# Patient Record
Sex: FEMALE | Race: BLACK OR AFRICAN AMERICAN | NOT HISPANIC OR LATINO | Employment: FULL TIME | URBAN - METROPOLITAN AREA
[De-identification: names, ages, dates, MRNs, and addresses within clinical notes are randomized per-mention and may not be internally consistent; named-entity substitution may affect disease eponyms.]

---

## 2019-01-01 ENCOUNTER — HOSPITAL ENCOUNTER (EMERGENCY)
Facility: HOSPITAL | Age: 19
Discharge: HOME | End: 2019-01-01
Attending: EMERGENCY MEDICINE | Admitting: EMERGENCY MEDICINE
Payer: COMMERCIAL

## 2019-01-01 VITALS
OXYGEN SATURATION: 98 % | TEMPERATURE: 98.7 F | SYSTOLIC BLOOD PRESSURE: 132 MMHG | HEART RATE: 110 BPM | RESPIRATION RATE: 20 BRPM | DIASTOLIC BLOOD PRESSURE: 62 MMHG

## 2019-01-01 DIAGNOSIS — L03.213 PERIORBITAL CELLULITIS OF LEFT EYE: Primary | ICD-10-CM

## 2019-01-01 PROCEDURE — 99282 EMERGENCY DEPT VISIT SF MDM: CPT

## 2019-01-01 RX ORDER — ERYTHROMYCIN 5 MG/G
0.5 OINTMENT OPHTHALMIC ONCE
Status: COMPLETED | OUTPATIENT
Start: 2019-01-01 | End: 2019-01-01

## 2019-01-01 RX ORDER — CLINDAMYCIN HYDROCHLORIDE 150 MG/1
450 CAPSULE ORAL EVERY 8 HOURS SCHEDULED
Qty: 90 CAPSULE | Refills: 0 | Status: SHIPPED | OUTPATIENT
Start: 2019-01-01 | End: 2019-01-11

## 2019-01-01 RX ORDER — ERYTHROMYCIN 5 MG/G
OINTMENT OPHTHALMIC
Qty: 3.5 G | Refills: 1 | Status: SHIPPED | OUTPATIENT
Start: 2019-01-01 | End: 2019-07-11 | Stop reason: ALTCHOICE

## 2019-01-01 RX ORDER — CLINDAMYCIN HYDROCHLORIDE 150 MG/1
450 CAPSULE ORAL ONCE
Status: COMPLETED | OUTPATIENT
Start: 2019-01-01 | End: 2019-01-01

## 2019-01-01 RX ADMIN — CLINDAMYCIN HYDROCHLORIDE 450 MG: 150 CAPSULE ORAL at 14:33

## 2019-01-01 RX ADMIN — ERYTHROMYCIN 0.5 INCH: 5 OINTMENT OPHTHALMIC at 14:33

## 2019-01-01 NOTE — DISCHARGE INSTRUCTIONS
Periorbital Cellulitis in Adults   WHAT YOU NEED TO KNOW:   Periorbital cellulitis is an infection of the skin and tissues in the front of your eye  The infection can quickly cause vision problems  It can spread to your brain and cause meningitis  Periorbital cellulitis must be treated immediately to prevent serious complications  DISCHARGE INSTRUCTIONS:   Seek care immediately if:   · You lose vision in your infected eye  · You have vision problems, such as double vision  · You have difficulty moving your eyeball  · You cannot close your eye due to swelling  · You have a fever  · You develop a headache and are vomiting  · You have a stiff neck  Contact your healthcare provider if:   · Your symptoms do not get better within 24 hours of treatment  · Your symptoms return  · You have questions or concerns about your condition or care  Medicines:   · Antibiotics  are used to treat a bacterial infection  · Take your medicine as directed  Contact your healthcare provider if you think your medicine is not helping or if you have side effects  Tell him or her if you are allergic to any medicine  Keep a list of the medicines, vitamins, and herbs you take  Include the amounts, and when and why you take them  Bring the list or the pill bottles to follow-up visits  Carry your medicine list with you in case of an emergency  Follow up with your healthcare provider as directed: You may be referred to other healthcare providers  You may also need more treatment  Write down your questions so you remember to ask them during your visits  Keep your eyes clean:  Clean your eyes with warm water daily and as needed  Wash your hands with soap and water before and after you clean your eyes  Protect your eyes:  Do not scratch or rub your eyes  © 2017 [employer] Information is for End User's use only and may not be sold, redistributed or otherwise used for commercial purposes   All illustrations and images included in CareNotes® are the copyrighted property of [employer]  or Jerel Mckeon  The above information is an  only  It is not intended as medical advice for individual conditions or treatments  Talk to your doctor, nurse or pharmacist before following any medical regimen to see if it is safe and effective for you

## 2019-01-01 NOTE — ED NOTES
Pt noted with swelling surrounding left eye  Pt reports eye swelling started yesterday  No distress noted  Pt resting with eyes closed in bed  Pt discussing plans for going out to eat with sister after treatment         Alexandria Tompkins RN  01/01/19 1568

## 2019-01-01 NOTE — ED PROVIDER NOTES
History  Chief Complaint   Patient presents with    Eye Redness     pt presents to the ed with left eye redness and swelling x 2 day        History provided by:  Patient   used: No    Eye Problem   Location:  Left eye  Quality: Swelling  Severity:  Moderate  Onset quality:  Gradual  Duration:  1 day  Timing:  Constant  Progression:  Worsening  Context: not burn, not chemical exposure, not direct trauma, not foreign body, not using machinery, not scratch, not smoke exposure and not UV exposure    Relieved by:  None tried  Worsened by:  Nothing  Ineffective treatments:  None tried  Associated symptoms: crusting    Associated symptoms: no blurred vision, no decreased vision, no discharge, no itching, no nausea, no photophobia, no vomiting and no weakness        None       History reviewed  No pertinent past medical history  History reviewed  No pertinent surgical history  History reviewed  No pertinent family history  I have reviewed and agree with the history as documented  Social History   Substance Use Topics    Smoking status: Not on file    Smokeless tobacco: Not on file    Alcohol use Yes        Review of Systems   Constitutional: Negative for activity change, appetite change, chills, diaphoresis, fatigue and fever  HENT: Negative for congestion, dental problem, ear discharge, facial swelling, nosebleeds, rhinorrhea, sinus pressure and trouble swallowing  Eyes: Negative for blurred vision, photophobia, discharge, itching and visual disturbance  Respiratory: Negative for choking, chest tightness and shortness of breath  Cardiovascular: Negative for chest pain, palpitations and leg swelling  Gastrointestinal: Negative for abdominal distention, abdominal pain, constipation, diarrhea, nausea and vomiting  Endocrine: Negative for polydipsia and polyphagia     Genitourinary: Negative for decreased urine volume, difficulty urinating, dysuria, flank pain, frequency, hematuria, vaginal bleeding and vaginal discharge  Musculoskeletal: Negative for back pain, gait problem, joint swelling, neck pain and neck stiffness  Skin: Negative for color change and rash  Neurological: Negative for dizziness, facial asymmetry, speech difficulty, weakness and light-headedness  Psychiatric/Behavioral: Negative for agitation and behavioral problems  The patient is not nervous/anxious and is not hyperactive  All other systems reviewed and are negative  Physical Exam  Physical Exam   Constitutional: She is oriented to person, place, and time  She appears well-developed and well-nourished  No distress  HENT:   Head: Normocephalic and atraumatic  Eyes: Pupils are equal, round, and reactive to light  EOM are normal    Puffiness around left eye  No pain on extraocular movements  Vision 20/30 bilaterally  No proptosis  No conjunctival injection  Neck: Normal range of motion  Neck supple  Cardiovascular: Normal rate, regular rhythm and normal heart sounds  No murmur heard  Pulmonary/Chest: Effort normal and breath sounds normal  No respiratory distress  She has no wheezes  She has no rales  Abdominal: Soft  Bowel sounds are normal  She exhibits no distension  There is no tenderness  There is no rebound and no guarding  Musculoskeletal: Normal range of motion  She exhibits no edema or deformity  Lymphadenopathy:     She has no cervical adenopathy  Neurological: She is alert and oriented to person, place, and time  No cranial nerve deficit  She exhibits normal muscle tone  Coordination normal    Skin: Capillary refill takes less than 2 seconds  No rash noted  No erythema  Psychiatric: She has a normal mood and affect  Her behavior is normal    Nursing note and vitals reviewed        Vital Signs  ED Triage Vitals [01/01/19 1352]   Temperature Pulse Respirations Blood Pressure SpO2   98 7 °F (37 1 °C) (!) 110 20 132/62 98 %      Temp Source Heart Rate Source Patient Position - Orthostatic VS BP Location FiO2 (%)   Tympanic Monitor -- -- --      Pain Score       --           Vitals:    01/01/19 1352   BP: 132/62   Pulse: (!) 110       Visual Acuity      ED Medications  Medications   erythromycin (ILOTYCIN) 0 5 % ophthalmic ointment 0 5 inch (0 5 inches Left Eye Given 1/1/19 1433)   clindamycin (CLEOCIN) capsule 450 mg (450 mg Oral Given 1/1/19 1433)       Diagnostic Studies  Results Reviewed     None                 No orders to display              Procedures  Procedures       Phone Contacts  ED Phone Contact    ED Course                               MDM  Number of Diagnoses or Management Options  Periorbital cellulitis of left eye: new and does not require workup  Diagnosis management comments: 21-year-old female presents emergency department for evaluation of swelling around left eye  Denies any visual complaints  No pain on extraocular movements  History and physical exam consistent with periorbital cellulitis  No foreign body sensation, no ocular trauma  Patient given clindamycin in emergency department, erythromycin and discharged in stable condition  Very strict return precautions given to patient to return to ED should she have any worsening symptoms including pain in her eye, worsening vision, increased swelling  Patient took 1st dose of antibiotics in emergency department, discharged in stable condition  No labs or imaging clinically indicated at this time      Risk of Complications, Morbidity, and/or Mortality  Presenting problems: moderate  Diagnostic procedures: moderate  Management options: moderate    Patient Progress  Patient progress: stable    CritCare Time    Disposition  Final diagnoses:   Periorbital cellulitis of left eye     Time reflects when diagnosis was documented in both MDM as applicable and the Disposition within this note     Time User Action Codes Description Comment    1/1/2019  2:53 PM Shaw Walker Add [[id number]] Periorbital cellulitis of left eye       ED Disposition     ED Disposition Condition Comment    Discharge  Graciela Naldo [address] discharge to home/self care  Condition at discharge: Good        Follow-up Information     Follow up With Specialties Details Why Contact Info Additional Information    [address] Emergency Department Emergency Medicine In 1 day If symptoms worsen [address] [facility], [place], [place], [address]          Discharge Medication List as of 1/1/2019  2:54 PM      START taking these medications    Details   clindamycin (CLEOCIN) 150 mg capsule Take 3 capsules (450 mg total) by mouth every 8 (eight) hours for 10 days, Starting Tue 1/1/2019, Until Fri 1/11/2019, Print      erythromycin (ILOTYCIN) ophthalmic ointment Place a 1/2 inch ribbon of ointment into the lower eyelid four times per day for 7 days  , Print           No discharge procedures on file      ED Provider  Electronically Signed by           Brian Bazan MD  01/01/19 6085

## 2019-01-02 ENCOUNTER — HOSPITAL ENCOUNTER (EMERGENCY)
Facility: HOSPITAL | Age: 19
Discharge: HOME | End: 2019-01-02
Attending: EMERGENCY MEDICINE | Admitting: EMERGENCY MEDICINE
Payer: COMMERCIAL

## 2019-01-02 VITALS
TEMPERATURE: 98.7 F | SYSTOLIC BLOOD PRESSURE: 127 MMHG | RESPIRATION RATE: 18 BRPM | HEART RATE: 91 BPM | BODY MASS INDEX: 20.21 KG/M2 | OXYGEN SATURATION: 99 % | HEIGHT: 68 IN | DIASTOLIC BLOOD PRESSURE: 60 MMHG | WEIGHT: 133.38 LBS

## 2019-01-02 DIAGNOSIS — L03.213 PERIORBITAL CELLULITIS OF LEFT EYE: Primary | ICD-10-CM

## 2019-01-02 PROCEDURE — 99283 EMERGENCY DEPT VISIT LOW MDM: CPT

## 2019-01-02 NOTE — DISCHARGE INSTRUCTIONS
Cellulitis   WHAT YOU NEED TO KNOW:   Cellulitis is a skin infection caused by bacteria  Cellulitis may go away on its own or you may need treatment  Your healthcare provider may draw a [place] around the outside edges of your cellulitis  If your cellulitis spreads, your healthcare provider will see it outside of the [place]  DISCHARGE INSTRUCTIONS:   Call 911 if:   · You have sudden trouble breathing or chest pain  Return to the emergency department if:   · Your wound gets larger and more painful  · You feel a crackling under your skin when you touch it  · You have purple dots or bumps on your skin, or you see bleeding under your skin  · You have new swelling and pain in your legs  · The red, warm, swollen area gets larger  · You see red streaks coming from the infected area  Contact your healthcare provider if:   · You have a fever  · Your fever or pain does not go away or gets worse  · The area does not get smaller after 2 days of antibiotics  · Your skin is flaking or peeling off  · You have questions or concerns about your condition or care  Medicines:   · Antibiotics  help treat the bacterial infection  · NSAIDs , such as ibuprofen, help decrease swelling, pain, and fever  NSAIDs can cause stomach bleeding or kidney problems in certain people  If you take blood thinner medicine, always ask if NSAIDs are safe for you  Always read the medicine label and follow directions  Do not give these medicines to children under 10months of age without direction from your child's healthcare provider  · Acetaminophen  decreases pain and fever  It is available without a doctor's order  Ask how much to take and how often to take it  Follow directions  Read the labels of all other medicines you are using to see if they also contain acetaminophen, or ask your doctor or pharmacist  Acetaminophen can cause liver damage if not taken correctly   Do not use more than 4 grams (4,000 milligrams) total of acetaminophen in one day  · Take your medicine as directed  Contact your healthcare provider if you think your medicine is not helping or if you have side effects  Tell him or her if you are allergic to any medicine  Keep a list of the medicines, vitamins, and herbs you take  Include the amounts, and when and why you take them  Bring the list or the pill bottles to follow-up visits  Carry your medicine list with you in case of an emergency  Self-care:   · Elevate the area above the level of your heart  as often as you can  This will help decrease swelling and pain  Prop the area on pillows or blankets to keep it elevated comfortably  · Clean the area daily until the wound scabs over  Gently wash the area with soap and water  Pat dry  Use dressings as directed  · Place cool or warm, wet cloths on the area as directed  Use clean cloths and clean water  Leave it on the area until the cloth is room temperature  Pat the area dry with a clean, dry cloth  The cloths may help decrease pain  Prevent cellulitis:   · Do not scratch bug bites or areas of injury  You increase your risk for cellulitis by scratching these areas  · Do not share personal items, such as towels, clothing, and razors  · Clean exercise equipment  with germ-killing  before and after you use it  · Wash your hands often  Use soap and water  Wash your hands after you use the bathroom, change a child's diapers, or sneeze  Wash your hands before you prepare or eat food  Use lotion to prevent dry, cracked skin  · Wear pressure stockings as directed  You may be told to wear the stockings if you have peripheral edema  The stockings improve blood flow and decrease swelling  · Treat athlete's foot  This can help prevent the spread of a bacterial skin infection  Follow up with your healthcare provider within 3 days, or as directed:   Your healthcare provider will check if your cellulitis is getting better  You may need different medicine  Write down your questions so you remember to ask them during your visits  © 2017 [employer] Information is for End User's use only and may not be sold, redistributed or otherwise used for commercial purposes  All illustrations and images included in CareNotes® are the copyrighted property of [employer]  or Jerel Mckeon  The above information is an  only  It is not intended as medical advice for individual conditions or treatments  Talk to your doctor, nurse or pharmacist before following any medical regimen to see if it is safe and effective for you

## 2019-01-02 NOTE — ED PROVIDER NOTES
History  Chief Complaint   Patient presents with    Eye Problem     Patient was here yesterday for LT eye infection,eye is still swollen and red and now is complaining of a stiff neck     79-year-old female here for recheck of left periorbital cellulitis  Patient states was swollen this morning but appears asymptomatic at this point states pain is greatly improved and swelling has reduced redness has gone away she feels much better  Patient states she was able to by enough medication for today is working on getting enough money to buy the rest of the prescription  No fevers no systemic symptoms no eye pain at this point  History provided by:  Patient   used: No        Prior to Admission Medications   Prescriptions Last Dose Informant Patient Reported? Taking? clindamycin (CLEOCIN) 150 mg capsule   No Yes   Sig: Take 3 capsules (450 mg total) by mouth every 8 (eight) hours for 10 days   erythromycin (ILOTYCIN) ophthalmic ointment   No Yes   Sig: Place a 1/2 inch ribbon of ointment into the lower eyelid four times per day for 7 days  Facility-Administered Medications: None       History reviewed  No pertinent past medical history  History reviewed  No pertinent surgical history  History reviewed  No pertinent family history  I have reviewed and agree with the history as documented  Social History   Substance Use Topics    Smoking status: Current Every Day Smoker    Smokeless tobacco: Never Used    Alcohol use Yes        Review of Systems   Constitutional: Negative for activity change, chills, diaphoresis and fever  HENT: Negative for congestion, ear pain, nosebleeds, sore throat, trouble swallowing and voice change  Eyes: Negative for pain, discharge and redness  Respiratory: Negative for apnea, cough, choking, shortness of breath, wheezing and stridor  Cardiovascular: Negative for chest pain and palpitations     Gastrointestinal: Negative for abdominal distention, abdominal pain, constipation, diarrhea, nausea and vomiting  Endocrine: Negative for polydipsia  Genitourinary: Negative for difficulty urinating, dysuria, flank pain, frequency, hematuria and urgency  Musculoskeletal: Negative for back pain, gait problem, joint swelling, myalgias, neck pain and neck stiffness  Skin: Negative for pallor and rash  Neurological: Negative for dizziness, tremors, syncope, speech difficulty, weakness, numbness and headaches  Hematological: Negative for adenopathy  Psychiatric/Behavioral: Negative for confusion, hallucinations, self-injury and suicidal ideas  The patient is not nervous/anxious  Physical Exam  Physical Exam   Constitutional: She is oriented to person, place, and time  Vital signs are normal  She appears well-developed and well-nourished  HENT:   Head: Normocephalic and atraumatic  Right Ear: External ear normal    Left Ear: External ear normal    Nose: Nose normal    Mouth/Throat: Oropharynx is clear and moist    Eyes: Pupils are equal, round, and reactive to light  Conjunctivae and EOM are normal    Neck: Normal range of motion  Neck supple  Cardiovascular: Normal rate and regular rhythm  Pulmonary/Chest: Effort normal and breath sounds normal    Abdominal: Soft  Bowel sounds are normal    Musculoskeletal: Normal range of motion  Neurological: She is alert and oriented to person, place, and time  Skin: Skin is warm  Psychiatric: She has a normal mood and affect  Nursing note and vitals reviewed        Vital Signs  ED Triage Vitals [01/02/19 1514]   Temperature Pulse Respirations Blood Pressure SpO2   98 7 °F (37 1 °C) 91 18 127/60 99 %      Temp Source Heart Rate Source Patient Position - Orthostatic VS BP Location FiO2 (%)   Tympanic Monitor Sitting Left arm --      Pain Score       --           Vitals:    01/02/19 1514   BP: 127/60   Pulse: 91   Patient Position - Orthostatic VS: Sitting       Visual Acuity      ED Medications  Medications - No data to display    Diagnostic Studies  Results Reviewed     None                 No orders to display              Procedures  Procedures       Phone Contacts  ED Phone Contact    ED Course                               MDM  CritCare Time    Disposition  Final diagnoses:   Periorbital cellulitis of left eye     Time reflects when diagnosis was documented in both MDM as applicable and the Disposition within this note     Time User Action Codes Description Comment    1/2/2019  3:28 PM Kalia Squires Add [[id number]] Periorbital cellulitis of left eye       ED Disposition     ED Disposition Condition Comment    Discharge  Adalroshni Avila [address] discharge to home/self care  Condition at discharge: Stable        Follow-up Information     Follow up With Specialties Details Why Contact Info Additional Information    [address] [facility] Emergency Department Emergency Medicine  As needed [facility]  [phone] [facility] [address]          Patient's Medications   Discharge Prescriptions    No medications on file     No discharge procedures on file      ED Provider  Electronically Signed by           Shailesh Martin DO  01/02/19 3793

## 2019-07-11 ENCOUNTER — OFFICE VISIT (OUTPATIENT)
Dept: OBGYN CLINIC | Facility: CLINIC | Age: 19
End: 2019-07-11
Payer: COMMERCIAL

## 2019-07-11 VITALS
SYSTOLIC BLOOD PRESSURE: 120 MMHG | HEIGHT: 68 IN | BODY MASS INDEX: 19.85 KG/M2 | DIASTOLIC BLOOD PRESSURE: 70 MMHG | WEIGHT: 131 LBS

## 2019-07-11 DIAGNOSIS — Z11.3 SCREENING EXAMINATION FOR STD (SEXUALLY TRANSMITTED DISEASE): ICD-10-CM

## 2019-07-11 DIAGNOSIS — Z01.419 ENCNTR FOR GYN EXAM (GENERAL) (ROUTINE) W/O ABN FINDINGS: Primary | ICD-10-CM

## 2019-07-11 DIAGNOSIS — Z30.015 ENCOUNTER FOR INITIAL PRESCRIPTION OF VAGINAL RING HORMONAL CONTRACEPTIVE: ICD-10-CM

## 2019-07-11 PROCEDURE — 99385 PREV VISIT NEW AGE 18-39: CPT | Performed by: NURSE PRACTITIONER

## 2019-07-11 RX ORDER — ETONOGESTREL AND ETHINYL ESTRADIOL 11.7; 2.7 MG/1; MG/1
INSERT, EXTENDED RELEASE VAGINAL
Qty: 9 EACH | Refills: 0 | Status: SHIPPED | OUTPATIENT
Start: 2019-07-11 | End: 2020-01-11

## 2019-07-11 NOTE — PROGRESS NOTES
Assessment/Plan   Diagnoses and all orders for this visit:    Encntr for gyn exam (general) (routine) w/o abn findings    Encounter for initial prescription of vaginal ring hormonal contraceptive  -     etonogestrel-ethinyl estradiol (NUVARING) 0 12-0 015 MG/24HR vaginal ring; Insert vaginally and leave in place for 3 consecutive weeks, then remove for 1 week  Screening examination for STD (sexually transmitted disease)  -     Hepatitis B surface antigen; Future  -     Hepatitis C antibody  -     HIV 1/2 AG-AB combo  -     RPR  -     Hepatitis B surface antigen  -     Chlamydia/GC amplified DNA by PCR        Discussion    Reviewed with patient normal exam today  Discussed Motrin, or hormonal contraceptives to help with menses  Pt would like contraception  Reviewed monthly SBEs  Encourage safe sexual practices; STD testing done today  Contraception - Rx for Nuvaring sent to pharmacy  Reviewed birth control options including OCP, NuvaRing, Patch, Depo provera, Nexplanon  Reviewed when to start  How to insert  Recommended condom use for STD protection and back up method for at least first month  Reviewed common side effects including N/V, HA, Breast Pain, Weight gain, bloating, mood swings  Reassured side effects diminished after first month or two on the ring  Reviewed clotting risk adn S/S of PE, DVT, MI, and stroke  The patient has/has not had the Gardasil vaccine series, which is recommended for patients from 526 years of age  Strongly encourage - handout given; pt to check with insurance for coverage and call if desires   Pap smears will start at age 24 per the ASCCP guidelines  All questions have been answered to her satisfaction  RTO 3 months for ring check or sooner as needed  Gisele Neves is a 22 y o  female new patient who presents for annual well woman exam    Last exam Never had one  Pap guidelines reviewed with patient  Pap deferred secondary to < 24years old    Pt denies any abnormal vaginal discharge, itching, or odor  Pt is not currently sexually active but has been in the past with both male and female partners, would like STD testing today  Menstrual Cycle:  LMP: 6/21/19  Period Cycle (Days): 30  Period Duration (Days): 3-4  Period Pattern: Regular  Menstrual Flow: Heavy(with lots of clots)  Menstrual Control: Tampon, Maxi pad  Menstrual Control Change Freq (Hours): Changes 5 x a day  Dysmenorrhea: (!) Severe  Dysmenorrhea Symptoms: Cramping, Nausea  Takes Motrin which seems to help  Contraception: None, would like to discuss  Practices monthly SBEs, no breast complaints today  Denies any bowel or bladder issues  Pt follows with PCP for regular check-ups and blood work  Review of Systems   All other systems reviewed and are negative  The following portions of the patient's history were reviewed and updated as appropriate: allergies, current medications, past family history, past medical history, past social history, past surgical history and problem list     Past Medical History:   Diagnosis Date    ADHD        History reviewed  No pertinent surgical history  History reviewed  No pertinent family history      Social History     Socioeconomic History    Marital status: Unknown     Spouse name: Not on file    Number of children: Not on file    Years of education: Not on file    Highest education level: Not on file   Occupational History    Not on file   Social Needs    Financial resource strain: Not on file    Food insecurity:     Worry: Not on file     Inability: Not on file    Transportation needs:     Medical: Not on file     Non-medical: Not on file   Tobacco Use    Smoking status: Never Smoker    Smokeless tobacco: Never Used   Substance and Sexual Activity    Alcohol use: Not Currently    Drug use: Yes     Types: Marijuana    Sexual activity: Yes     Partners: Female, Male     Birth control/protection: None     Comment: Requesting STD testing    Lifestyle    Physical activity:     Days per week: Not on file     Minutes per session: Not on file    Stress: Not on file   Relationships    Social connections:     Talks on phone: Not on file     Gets together: Not on file     Attends Orthodoxy service: Not on file     Active member of club or organization: Not on file     Attends meetings of clubs or organizations: Not on file     Relationship status: Not on file    Intimate partner violence:     Fear of current or ex partner: Not on file     Emotionally abused: Not on file     Physically abused: Not on file     Forced sexual activity: Not on file   Other Topics Concern    Not on file   Social History Narrative    Not on file         Current Outpatient Medications:     etonogestrel-ethinyl estradiol (NUVARING) 0 12-0 015 MG/24HR vaginal ring, Insert vaginally and leave in place for 3 consecutive weeks, then remove for 1 week , Disp: 9 each, Rfl: 0    No Known Allergies    Objective   Vitals:    07/11/19 1418   BP: 120/70   Weight: 59 4 kg (131 lb)   Height: 5' 8" (1 727 m)     Physical Exam   Constitutional: She is oriented to person, place, and time  She appears well-developed and well-nourished  HENT:   Head: Normocephalic  Neck: Normal range of motion  Neck supple  No tracheal deviation present  No thyromegaly present  Cardiovascular: Normal rate, regular rhythm and normal heart sounds  Pulmonary/Chest: Effort normal and breath sounds normal  Right breast exhibits no inverted nipple, no mass, no nipple discharge, no skin change and no tenderness  Left breast exhibits no inverted nipple, no mass, no nipple discharge, no skin change and no tenderness  No breast tenderness, discharge or bleeding  Breasts are symmetrical    Abdominal: Soft  Bowel sounds are normal  She exhibits no distension and no mass  There is no tenderness  There is no rebound and no guarding     Genitourinary: Vagina normal and uterus normal  No breast tenderness, discharge or bleeding  No labial fusion  There is no rash, tenderness, lesion or injury on the right labia  There is no rash, tenderness, lesion or injury on the left labia  Cervix exhibits no motion tenderness, no discharge and no friability  Right adnexum displays no mass, no tenderness and no fullness  Left adnexum displays no mass, no tenderness and no fullness  Musculoskeletal: Normal range of motion  Neurological: She is alert and oriented to person, place, and time  Skin: Skin is warm and dry  Psychiatric: She has a normal mood and affect   Her behavior is normal  Judgment and thought content normal

## 2019-07-13 LAB
C TRACH RRNA SPEC QL NAA+PROBE: NEGATIVE
N GONORRHOEA RRNA SPEC QL NAA+PROBE: NEGATIVE

## 2019-11-19 LAB
HBV SURFACE AG SERPL QL IA: NEGATIVE
HCV AB S/CO SERPL IA: <0.1 S/CO RATIO (ref 0–0.9)
RPR SER QL: NON REACTIVE

## 2020-01-11 ENCOUNTER — HOSPITAL ENCOUNTER (EMERGENCY)
Facility: HOSPITAL | Age: 20
Discharge: HOME | End: 2020-01-11
Attending: EMERGENCY MEDICINE
Payer: COMMERCIAL

## 2020-01-11 VITALS
TEMPERATURE: 99.1 F | BODY MASS INDEX: 20.4 KG/M2 | WEIGHT: 130 LBS | SYSTOLIC BLOOD PRESSURE: 118 MMHG | OXYGEN SATURATION: 97 % | RESPIRATION RATE: 16 BRPM | DIASTOLIC BLOOD PRESSURE: 56 MMHG | HEIGHT: 67 IN | HEART RATE: 103 BPM

## 2020-01-11 DIAGNOSIS — J10.1 INFLUENZA A: Primary | ICD-10-CM

## 2020-01-11 LAB
EXT PREG TEST URINE: NEGATIVE
EXT. CONTROL ED NAV: NORMAL
FLUAV RNA NPH QL NAA+PROBE: DETECTED
FLUBV RNA NPH QL NAA+PROBE: ABNORMAL
RSV RNA NPH QL NAA+PROBE: ABNORMAL

## 2020-01-11 PROCEDURE — 99283 EMERGENCY DEPT VISIT LOW MDM: CPT

## 2020-01-11 PROCEDURE — 99284 EMERGENCY DEPT VISIT MOD MDM: CPT | Performed by: PHYSICIAN ASSISTANT

## 2020-01-11 PROCEDURE — 81025 URINE PREGNANCY TEST: CPT | Performed by: PHYSICIAN ASSISTANT

## 2020-01-11 PROCEDURE — 87631 RESP VIRUS 3-5 TARGETS: CPT | Performed by: PHYSICIAN ASSISTANT

## 2020-01-11 RX ORDER — BENZONATATE 100 MG/1
100 CAPSULE ORAL 3 TIMES DAILY PRN
Qty: 20 CAPSULE | Refills: 0 | Status: SHIPPED | OUTPATIENT
Start: 2020-01-11 | End: 2021-12-22

## 2020-01-11 RX ORDER — NAPROXEN 500 MG/1
500 TABLET ORAL ONCE
Status: COMPLETED | OUTPATIENT
Start: 2020-01-11 | End: 2020-01-11

## 2020-01-11 RX ORDER — NAPROXEN 500 MG/1
500 TABLET ORAL 2 TIMES DAILY WITH MEALS
Qty: 20 TABLET | Refills: 0 | Status: SHIPPED | OUTPATIENT
Start: 2020-01-11 | End: 2021-12-22

## 2020-01-11 RX ADMIN — NAPROXEN 500 MG: 500 TABLET ORAL at 10:49

## 2020-01-11 NOTE — ED PROVIDER NOTES
History  Chief Complaint   Patient presents with    Generalized Body Aches     Pt c/o all over body aches since last night  Pt states "pain is from head to toe"     58-year-old healthy female, presenting today with generalized body aches that began last evening  States that pain is all over with a mild headache is located in all extremities  No sick contacts that she knows of  States that she was nauseous however no vomiting  Did not eat or drink this morning however does not typically breakfast   Up-to-date on vaccinations however did not get her flu shot this year  Denies vomiting, diarrhea, cough, congestion, sore throat, weakness, fatigue, abdominal pain, chest pain, changes in urination, pregnancy  None       Past Medical History:   Diagnosis Date    ADHD        History reviewed  No pertinent surgical history  History reviewed  No pertinent family history  I have reviewed and agree with the history as documented  Social History     Tobacco Use    Smoking status: Never Smoker    Smokeless tobacco: Never Used   Substance Use Topics    Alcohol use: Not Currently    Drug use: Yes     Types: Marijuana        Review of Systems   Constitutional: Negative  Negative for chills, fever and unexpected weight change  Denies IV drug use     HENT: Negative  Eyes: Negative  Respiratory: Negative  Negative for cough, chest tightness, shortness of breath and wheezing  Cardiovascular: Negative  Negative for chest pain and palpitations  Gastrointestinal: Negative  Negative for abdominal pain, constipation, diarrhea, nausea and vomiting  Genitourinary: Negative  Negative for difficulty urinating, dysuria, flank pain, frequency, hematuria and urgency  Denies numbness, tingling in the groin  Musculoskeletal: Positive for arthralgias and myalgias  Negative for back pain, gait problem, joint swelling, neck pain and neck stiffness  Skin: Negative    Negative for color change  Neurological: Negative  Negative for dizziness, tremors, weakness, light-headedness, numbness and headaches  All other systems reviewed and are negative  Physical Exam  Physical Exam   Constitutional: She is oriented to person, place, and time  She appears well-developed and well-nourished  HENT:   Head: Normocephalic and atraumatic  Right Ear: External ear normal    Left Ear: External ear normal    Nose: Nose normal    Mouth/Throat: Oropharynx is clear and moist    Eyes: Pupils are equal, round, and reactive to light  Conjunctivae and EOM are normal    Neck: Normal range of motion  Neck supple  Slightly enlarged more prominent right-sided posterior cervical adenopathy that is nontender, otherwise no other adenopathy   Cardiovascular: Normal rate, regular rhythm, normal heart sounds and intact distal pulses  Exam reveals no gallop and no friction rub  No murmur heard  Pulmonary/Chest: Effort normal and breath sounds normal  No stridor  No respiratory distress  She has no wheezes  She has no rales  She exhibits no tenderness  S PO2 is 98% indicating adequate oxygenation clear breath sounds noted throughout all lung fields   Abdominal: Soft  Bowel sounds are normal  She exhibits no distension and no mass  There is no tenderness  There is no rebound and no guarding  No hernia  Neurological: She is alert and oriented to person, place, and time  Skin: Skin is warm and dry  Capillary refill takes less than 2 seconds  Nursing note and vitals reviewed        Vital Signs  ED Triage Vitals   Temperature Pulse Respirations Blood Pressure SpO2   01/11/20 1010 01/11/20 1010 01/11/20 1010 01/11/20 1010 01/11/20 1010   99 1 °F (37 3 °C) 105 18 136/70 98 %      Temp Source Heart Rate Source Patient Position - Orthostatic VS BP Location FiO2 (%)   01/11/20 1010 01/11/20 1010 -- -- --   Oral Monitor         Pain Score       01/11/20 1009       8           Vitals:    01/11/20 1010   BP: 136/70 Pulse: 105         Visual Acuity      ED Medications  Medications   naproxen (NAPROSYN) tablet 500 mg (500 mg Oral Given 1/11/20 1049)       Diagnostic Studies  Results Reviewed     Procedure Component Value Units Date/Time    Influenza A/B and RSV PCR [[id number]]  (Abnormal) Collected:  01/11/20 1027    Lab Status:  Final result Specimen:  Nasopharyngeal Swab Updated:  01/11/20 1116     INFLUENZA A PCR Detected     INFLUENZA B PCR None Detected     RSV PCR None Detected    POCT pregnancy, urine [[id number]]  (Normal) Resulted:  01/11/20 1048    Lab Status:  Final result Updated:  01/11/20 1048     EXT PREG TEST UR (Ref: Negative) Negative     Control Valid                 No orders to display              Procedures  Procedures         ED Course                               MDM  Number of Diagnoses or Management Options  Influenza A:   Diagnosis management comments: Patient appears very well  Will swab for flu, however consider early mono given right posterior cervical adenopathy  Patient was positive for the flu  I have thorough discussion with patient regarding risks versus benefits of Tamiflu treatment, she would like to defer Tamiflu at this time  Will treat symptomatically  Patient is informed to return to the emergency department for worsening of symptoms and was given proper education regarding their diagnosis and symptoms  Otherwise the patient is informed to follow up with their primary care doctor for re-evaluation  The patient verbalizes understanding and agrees with above assessment and plan  All questions were answered  Please Note: Fluency Direct voice recognition software may have been used in the creation of this document  Wrong words or sound a like substitutions may have occurred due to the inherent limitations of the voice software             Amount and/or Complexity of Data Reviewed  Clinical lab tests: ordered and reviewed  Review and summarize past medical records: yes  Independent visualization of images, tracings, or specimens: yes          Disposition  Final diagnoses:   Influenza A     Time reflects when diagnosis was documented in both MDM as applicable and the Disposition within this note     Time User Action Codes Description Comment    1/11/2020 11:23 AM Fidel Bearden [J10 1] Influenza A       ED Disposition     ED Disposition Condition Date/Time Comment    Discharge Stable Sat Jan 11, 2020 11:23 AM Selvin Else discharge to home/self care  Follow-up Information     Follow up With Specialties Details Why Contact Info Additional [address] Emergency Department Emergency Medicine Go to  If symptoms worsen such as dehydration or difficulty breathing etc  [address] [facility], [place] [facility], [facility], [address]          Patient's Medications   Discharge Prescriptions    BENZONATATE (TESSALON PERLES) 100 MG CAPSULE    Take 1 capsule (100 mg total) by mouth 3 (three) times a day as needed for cough       Start Date: 1/11/2020 End Date: --       Order Dose: 100 mg       Quantity: 20 capsule    Refills: 0    NAPROXEN (NAPROSYN) 500 MG TABLET    Take 1 tablet (500 mg total) by mouth 2 (two) times a day with meals for 10 days       Start Date: 1/11/2020 End Date: 1/21/2020       Order Dose: 500 mg       Quantity: 20 tablet    Refills: 0     No discharge procedures on file      ED Provider  Electronically Signed by           Syed Rendon PA-C  01/11/20 1124

## 2021-12-22 ENCOUNTER — HOSPITAL ENCOUNTER (EMERGENCY)
Facility: HOSPITAL | Age: 21
Discharge: HOME | End: 2021-12-22
Attending: EMERGENCY MEDICINE
Payer: COMMERCIAL

## 2021-12-22 VITALS
BODY MASS INDEX: 20.2 KG/M2 | WEIGHT: 129 LBS | TEMPERATURE: 98.4 F | HEART RATE: 96 BPM | SYSTOLIC BLOOD PRESSURE: 128 MMHG | RESPIRATION RATE: 20 BRPM | DIASTOLIC BLOOD PRESSURE: 63 MMHG | OXYGEN SATURATION: 98 %

## 2021-12-22 DIAGNOSIS — Z20.822 ENCOUNTER FOR LABORATORY TESTING FOR COVID-19 VIRUS: Primary | ICD-10-CM

## 2021-12-22 DIAGNOSIS — J06.9 VIRAL URI WITH COUGH: ICD-10-CM

## 2021-12-22 PROCEDURE — 99283 EMERGENCY DEPT VISIT LOW MDM: CPT | Performed by: EMERGENCY MEDICINE

## 2021-12-22 PROCEDURE — 87636 SARSCOV2 & INF A&B AMP PRB: CPT | Performed by: EMERGENCY MEDICINE

## 2021-12-22 PROCEDURE — 99283 EMERGENCY DEPT VISIT LOW MDM: CPT

## 2021-12-24 LAB
FLUAV RNA RESP QL NAA+PROBE: NEGATIVE
FLUBV RNA RESP QL NAA+PROBE: NEGATIVE
SARS-COV-2 RNA RESP QL NAA+PROBE: NEGATIVE

## 2022-05-12 ENCOUNTER — HOSPITAL ENCOUNTER (EMERGENCY)
Facility: HOSPITAL | Age: 22
Discharge: HOME | End: 2022-05-12
Attending: EMERGENCY MEDICINE | Admitting: EMERGENCY MEDICINE
Payer: COMMERCIAL

## 2022-05-12 ENCOUNTER — APPOINTMENT (EMERGENCY)
Dept: RADIOLOGY | Facility: HOSPITAL | Age: 22
End: 2022-05-12
Payer: COMMERCIAL

## 2022-05-12 VITALS
OXYGEN SATURATION: 99 % | TEMPERATURE: 97.9 F | SYSTOLIC BLOOD PRESSURE: 134 MMHG | HEART RATE: 98 BPM | RESPIRATION RATE: 17 BRPM | DIASTOLIC BLOOD PRESSURE: 70 MMHG

## 2022-05-12 DIAGNOSIS — R07.9 CHEST PAIN: ICD-10-CM

## 2022-05-12 DIAGNOSIS — R05.9 COUGH: Primary | ICD-10-CM

## 2022-05-12 LAB
FLUAV RNA RESP QL NAA+PROBE: NEGATIVE
FLUBV RNA RESP QL NAA+PROBE: NEGATIVE
RSV RNA RESP QL NAA+PROBE: NEGATIVE
SARS-COV-2 RNA RESP QL NAA+PROBE: NEGATIVE

## 2022-05-12 PROCEDURE — 99282 EMERGENCY DEPT VISIT SF MDM: CPT | Performed by: PHYSICIAN ASSISTANT

## 2022-05-12 PROCEDURE — 0241U HB NFCT DS VIR RESP RNA 4 TRGT: CPT | Performed by: PHYSICIAN ASSISTANT

## 2022-05-12 PROCEDURE — 71045 X-RAY EXAM CHEST 1 VIEW: CPT

## 2022-05-12 PROCEDURE — 99285 EMERGENCY DEPT VISIT HI MDM: CPT

## 2022-05-12 NOTE — Clinical Note
Tu Escalera was seen and treated in our emergency department on 5/12/2022  Diagnosis:     Rommel Garcia  may return to work on return date  She may return on this date: 05/13/2022         If you have any questions or concerns, please don't hesitate to call        Suzie Paniagua PA-C    ______________________________           _______________          _______________  Hospital Representative                              Date                                Time

## 2022-05-12 NOTE — ED PROVIDER NOTES
History  Chief Complaint   Patient presents with    Shortness of Breath    Cough    Sore Throat    Chest Pain     Patient c/o cough, sore throat, chest pain, and SOB that started this morning  79-year-old female presenting today with a cough and sore throat that began this morning, patient states that she coughed partially with large production of mucus and now has subsequent chest pain primarily along the left portion of the chest   States that she now has some shortness of breath and her chest is tender to the touch  Has not had any sick contacts that she knows of however states that she does work at [employer]  On no birth control, denies pregnancy, states that she does have a history of smoking however no asthma  Denies nausea, vomiting, diarrhea, difficulty swallowing, abdominal pain, flank pain a calf pain  None       Past Medical History:   Diagnosis Date    ADHD        History reviewed  No pertinent surgical history  History reviewed  No pertinent family history  I have reviewed and agree with the history as documented  E-Cigarette/Vaping    E-Cigarette Use Current Every Day User      E-Cigarette/Vaping Substances    Nicotine Yes     Flavoring Yes      Social History     Tobacco Use    Smoking status: Never Smoker    Smokeless tobacco: Never Used   Vaping Use    Vaping Use: Every day    Substances: Nicotine, Flavoring   Substance Use Topics    Alcohol use: Not Currently    Drug use: Yes     Types: Marijuana       Review of Systems   Constitutional: Negative  HENT: Negative  Eyes: Negative  Respiratory: Positive for cough, chest tightness and shortness of breath  Negative for apnea, choking, wheezing and stridor  Cardiovascular: Positive for chest pain  Negative for palpitations and leg swelling  Gastrointestinal: Negative  Endocrine: Negative  Genitourinary: Negative  Musculoskeletal: Negative  Skin: Negative  Allergic/Immunologic: Negative  Neurological: Negative  Hematological: Negative  Psychiatric/Behavioral: Negative  All other systems reviewed and are negative  Physical Exam  Physical Exam  Vitals and nursing note reviewed  Constitutional:       General: She is not in acute distress  Appearance: Normal appearance  She is well-developed and normal weight  She is not diaphoretic  Comments: Resting comfortably no respiratory distress   HENT:      Head: Normocephalic and atraumatic  Right Ear: External ear normal       Left Ear: External ear normal       Nose: Nose normal       Mouth/Throat:      Pharynx: No oropharyngeal exudate  Eyes:      General: No scleral icterus  Right eye: No discharge  Left eye: No discharge  Conjunctiva/sclera: Conjunctivae normal       Pupils: Pupils are equal, round, and reactive to light  Cardiovascular:      Rate and Rhythm: Normal rate and regular rhythm  Pulses: Normal pulses  Heart sounds: Normal heart sounds  No murmur heard  No friction rub  No gallop  Pulmonary:      Effort: Pulmonary effort is normal  No respiratory distress  Breath sounds: Normal breath sounds  No stridor  No wheezing, rhonchi or rales  Comments: S PO2 is 99% indicating adequate oxygenation, clear breath sounds in all lung fields, left-sided chest wall tenderness  Chest:      Chest wall: Tenderness present  Abdominal:      General: Abdomen is flat  Bowel sounds are normal  There is no distension  Palpations: Abdomen is soft  There is no mass  Tenderness: There is no abdominal tenderness  There is no guarding or rebound  Hernia: No hernia is present  Musculoskeletal:      Cervical back: Normal range of motion and neck supple  Comments: Calves are not swollen, no asymmetries, nontender   Lymphadenopathy:      Cervical: No cervical adenopathy  Skin:     General: Skin is warm and dry        Capillary Refill: Capillary refill takes less than 2 seconds  Coloration: Skin is not pale  Findings: No erythema or rash  Neurological:      General: No focal deficit present  Mental Status: She is alert and oriented to person, place, and time  Mental status is at baseline  Vital Signs  ED Triage Vitals [05/12/22 1016]   Temperature Pulse Respirations Blood Pressure SpO2   97 9 °F (36 6 °C) 98 17 134/70 99 %      Temp Source Heart Rate Source Patient Position - Orthostatic VS BP Location FiO2 (%)   Tympanic Monitor Sitting Right arm --      Pain Score       6           Vitals:    05/12/22 1016   BP: 134/70   Pulse: 98   Patient Position - Orthostatic VS: Sitting         Visual Acuity      ED Medications  Medications - No data to display    Diagnostic Studies  Results Reviewed     Procedure Component Value Units Date/Time    COVID/FLU/RSV - 2 hour TAT [[id number]]  (Normal) Collected: 05/12/22 1117    Lab Status: Final result Specimen: Nares from Nose Updated: 05/12/22 1201     SARS-CoV-2 Negative     INFLUENZA A PCR Negative     INFLUENZA B PCR Negative     RSV PCR Negative    Narrative:      FOR PEDIATRIC PATIENTS - copy/paste COVID Guidelines URL to browser: [email or web address]  [employer]    SARS-CoV-2 assay is a Nucleic Acid Amplification assay intended for the  qualitative detection of nucleic acid from SARS-CoV-2 in nasopharyngeal  swabs  Results are for the presumptive identification of SARS-CoV-2 RNA  Positive results are indicative of infection with SARS-CoV-2, the virus  causing COVID-19, but do not rule out bacterial infection or co-infection  with other viruses  Laboratories within the [place] and its  territories are required to report all positive results to the appropriate  public health authorities  Negative results do not preclude SARS-CoV-2  infection and should not be used as the sole basis for treatment or other  patient management decisions   Negative results must be combined with  clinical observations, patient history, and epidemiological information  This test has not been FDA cleared or approved  This test has been authorized by FDA under an Emergency Use Authorization  (EUA)  This test is only authorized for the duration of time the  declaration that circumstances exist justifying the authorization of the  emergency use of an in vitro diagnostic tests for detection of SARS-CoV-2  virus and/or diagnosis of COVID-19 infection under section 564(b)(1) of  the Act, 21 U  S C  332GQM-7(U)(8), unless the authorization is terminated  or revoked sooner  The test has been validated but independent review by FDA  and CLIA is pending  Test performed using [employer] GeneXpert: This RT-PCR assay targets N2,  a region unique to SARS-CoV-2  A conserved region in the E-gene was chosen  for pan-Sarbecovirus detection which includes SARS-CoV-2  XR chest 1 view portable   Final Result by Wes Monterroso MD (05/12 1115)      No acute cardiopulmonary disease  Workstation performed: [id number]                    Procedures  Procedures         ED Course                       PERC Rule for PE    Flowsheet Row Most Recent Value   PERC Rule for PE    Age >=50 0 Filed at: 05/12/2022 1026   HR >=100 0 Filed at: 05/12/2022 1026   O2 Sat on room air < 95% 0 Filed at: 05/12/2022 1026   History of PE or DVT 0 Filed at: 05/12/2022 1026   Recent trauma or surgery 0 Filed at: 05/12/2022 1026   Hemoptysis 0 Filed at: 05/12/2022 1026   Exogenous estrogen 0 Filed at: 05/12/2022 1026   Unilateral leg swelling 0 Filed at: 05/12/2022 1026   PERC Rule for PE Results 0 Filed at: 05/12/2022 1026                            MDM  Number of Diagnoses or Management Options  Chest pain  Cough  Diagnosis management comments: Chest x-ray obtained to assess for any pneumomediastinum, pneumothorax  Discussed chest x-ray with the patient    Will swab for COVID otherwise suspect musculoskeletal/ costochondral pain in nature  Patient is informed to return to the emergency department for worsening of symptoms such as severe pain, difficulty breathing etc and was given proper education regarding their diagnosis and symptoms  Otherwise the patient is informed to follow up with their primary care doctor for re-evaluation  The patient verbalizes understanding and agrees with above assessment and plan  All questions were answered  Please Note: Fluency Direct voice recognition software may have been used in the creation of this document  Wrong words or sound a like substitutions may have occurred due to the inherent limitations of the voice software  Amount and/or Complexity of Data Reviewed  Clinical lab tests: ordered  Tests in the radiology section of CPT®: ordered and reviewed  Review and summarize past medical records: yes  Independent visualization of images, tracings, or specimens: yes        Disposition  Final diagnoses:   Cough   Chest pain     Time reflects when diagnosis was documented in both MDM as applicable and the Disposition within this note     Time User Action Codes Description Comment    5/12/2022 10:53 AM Rika Phi Add [R05 9] Cough     5/12/2022 10:53 AM Rika Phi Add [R07 9] Chest pain       ED Disposition     ED Disposition   Discharge    Condition   Stable    Date/Time   Thu May 12, 2022 10:53 AM    Comment   Christian Barber discharge to home/self care                 Follow-up Information     Follow up With Specialties Details Why Contact Info Additional [address] Emergency Department Emergency Medicine Go to  If symptoms worsen such as difficulty breathing, otherwise please follow up with your family doctor [address] [place], [place] [address] [address] [address] [address] Emergency Department, [facility], [address] [place] [address], [facility]          There are no discharge medications for this patient  No discharge procedures on file      PDMP Review     None          ED Provider  Electronically Signed by           Leigha Ayoub PA-C  05/12/22 6177

## 2022-08-15 ENCOUNTER — TELEPHONE (OUTPATIENT)
Dept: OBGYN CLINIC | Facility: CLINIC | Age: 22
End: 2022-08-15

## 2022-08-15 NOTE — TELEPHONE ENCOUNTER
Pt needs to be rescheduled in the [place] office per her insurance  For preconception counseling

## 2022-09-15 ENCOUNTER — OFFICE VISIT (OUTPATIENT)
Dept: OBGYN CLINIC | Facility: CLINIC | Age: 22
End: 2022-09-15
Payer: COMMERCIAL

## 2022-09-15 VITALS — DIASTOLIC BLOOD PRESSURE: 62 MMHG | BODY MASS INDEX: 18.01 KG/M2 | WEIGHT: 115 LBS | SYSTOLIC BLOOD PRESSURE: 102 MMHG

## 2022-09-15 DIAGNOSIS — Z31.69 ENCOUNTER FOR PRECONCEPTION CONSULTATION: Primary | ICD-10-CM

## 2022-09-15 PROCEDURE — 99203 OFFICE O/P NEW LOW 30 MIN: CPT | Performed by: PHYSICIAN ASSISTANT

## 2022-09-15 RX ORDER — ASCORBIC ACID, CHOLECALCIFEROL, .ALPHA.-TOCOPHEROL ACETATE, DL-, PYRIDOXINE HYDROCHLORIDE, FOLIC ACID, 5-METHYLTETRAHYDROFOLIC ACID, CYANOCOBALAMIN, BIOTIN, CALCIUM FORMATE, FERROUS ASPARTO GLYCINATE, POTASSIUM IODIDE, MAGNESIUM OXIDE, DOCONEXENT AND ICOSAPENT 90; 220; 10; 26; 400; 600; 13; 280; 155; 18; 150; 50; 300; 40 MG/1; [IU]/1; [IU]/1; MG/1; UG/1; UG/1; UG/1; UG/1; MG/1; MG/1; UG/1; MG/1; MG/1; MG/1
CAPSULE, GELATIN COATED ORAL
Qty: 30 CAPSULE | Refills: 11 | Status: SHIPPED | OUTPATIENT
Start: 2022-09-15

## 2022-09-15 NOTE — PROGRESS NOTES
Assessment/Plan:    No problem-specific Assessment & Plan notes found for this encounter  Problem List Items Addressed This Visit    None     Visit Diagnoses     Encounter for preconception consultation    -  Primary    Relevant Medications    Prenat-FeAsp-Meth-FA-DHA w/o A (Prenate Essential) 18-0 6-0 4-300 MG CAPS            We reviewed menstrual apps and timing for ovulation  Pt states that she is worried because she didn't use BC most of her life and has never gotten pregnant  We reviewed the difference in timing IC and trying for pregnancy vs not using any BC with infrequent weekly IC  I r/w pt the importance of taking a PNV  Aware of need to use 3 months prior to conception to decrease the risk of ONTD  We reviewed her marijuana habits and I stressed the importance for both her to quit prior to conception as well as her partner to limit use as to not negatively affect his sperm count  We reviewed importance of eating healthy and getting adequate sleep and exercise  We reviewed the average time for conception is 4-6 months  Aware if no pregnancy later next spring, we can then further consider additional work up for both patient and her partner  Pt to plan pap and APE as well as she is now due for a pap  All questions answered  Subjective:      Patient ID: Debora Byers is a 20 y o  female  Pt presents as an old patient ot our office to [facility] to review pregnancy  Pt presents today with her mother  Menarche 14/28/4-5 periods heavy  Is sexually active w partner x 9 months  On no BC  Has not used BC with this partner  Pt has been trying for pregnancy for the last 4 months  Has [facility]IC 2 times per week  Uses period lety for fertility window and increases IC during that time  Pt works at [employer]  Partner is unemployed currently  Partner does not have any other children he is 21years old  No exposure to chemicals or radiation that she is aware of     Both pt and partner do smoke marijuana on a daily basis  We reviewed menstrual apps and timing for ovulation  Pt states that she is worried because she didn't use BC most of her life and has never gotten pregnant  We reviewed the difference in timing IC and trying for pregnancy vs not using any BC with infrequent weekly IC  I r/w pt the importance of taking a PNV  Aware of need to use 3 months prior to conception to decrease the risk of ONTD  We reviewed her marijuana habits and I stressed the importance for both her to quit prior to conception as well as her partner to limit use as to not negatively affect his sperm count  We reviewed importance of eating healthy and getting adequate sleep and exercise  We reviewed the average time for conception is 4-6 months  Aware if no pregnancy later next spring, we can then further consider additional work up for both patient and her partner  All questions answered  The following portions of the patient's history were reviewed and updated as appropriate:   She  has a past medical history of ADHD  She There are no problems to display for this patient  She  has no past surgical history on file  Her family history includes HIV in her mother; Leukemia in her other  She  reports that she has never smoked  She has never used smokeless tobacco  She reports previous alcohol use  She reports current drug use  Drug: Marijuana  Current Outpatient Medications   Medication Sig Dispense Refill    Prenat-FeAsp-Meth-FA-DHA w/o A (Prenate Essential) 18-0 6-0 4-300 MG CAPS 1 tab PO QD 30 capsule 11     No current facility-administered medications for this visit  No current outpatient medications on file prior to visit  No current facility-administered medications on file prior to visit  She has No Known Allergies       Review of Systems   Constitutional: Negative  Respiratory: Negative  Cardiovascular: Negative  Gastrointestinal: Negative  Genitourinary: Negative  Musculoskeletal: Negative  Psychiatric/Behavioral: Negative  Objective:      /62 (BP Location: Left arm, Patient Position: Sitting, Cuff Size: Standard)   Wt 52 2 kg (115 lb)   LMP 09/06/2022 (Exact Date)   BMI 18 01 kg/m²          Physical Exam  Vitals reviewed  Constitutional:       Appearance: She is normal weight  HENT:      Head: Normocephalic and atraumatic  Skin:     General: Skin is warm and dry  Neurological:      Mental Status: She is alert  Psychiatric:         Mood and Affect: Mood normal          Behavior: Behavior normal          Thought Content:  Thought content normal          Judgment: Judgment normal

## 2022-11-07 ENCOUNTER — TELEPHONE (OUTPATIENT)
Dept: OBGYN CLINIC | Facility: CLINIC | Age: 22
End: 2022-11-07

## 2022-11-07 NOTE — TELEPHONE ENCOUNTER
T/c from patient had a positive pregnancy LMP unknown , will call back with info to schedule initial prenatal , if unknow will send for quant HCG and schedule based on results    MM CMA

## 2022-12-07 ENCOUNTER — HOSPITAL ENCOUNTER (EMERGENCY)
Facility: HOSPITAL | Age: 22
Discharge: HOME | End: 2022-12-07
Attending: EMERGENCY MEDICINE

## 2022-12-07 VITALS
WEIGHT: 123.68 LBS | DIASTOLIC BLOOD PRESSURE: 58 MMHG | OXYGEN SATURATION: 98 % | SYSTOLIC BLOOD PRESSURE: 127 MMHG | RESPIRATION RATE: 20 BRPM | BODY MASS INDEX: 19.37 KG/M2 | TEMPERATURE: 98 F | HEART RATE: 95 BPM

## 2022-12-07 DIAGNOSIS — O21.9 NAUSEA AND VOMITING IN PREGNANCY: Primary | ICD-10-CM

## 2022-12-07 RX ORDER — METOCLOPRAMIDE 10 MG/1
10 TABLET ORAL ONCE
Status: COMPLETED | OUTPATIENT
Start: 2022-12-07 | End: 2022-12-07

## 2022-12-07 RX ORDER — METOCLOPRAMIDE 10 MG/1
10 TABLET ORAL EVERY 6 HOURS
Qty: 15 TABLET | Refills: 0 | Status: SHIPPED | OUTPATIENT
Start: 2022-12-07

## 2022-12-07 RX ADMIN — METOCLOPRAMIDE 10 MG: 10 TABLET ORAL at 15:33

## 2022-12-07 NOTE — ED PROVIDER NOTES
History  Chief Complaint   Patient presents with   • Vomiting During Pregnancy     States approx 2 months pregnant  Has not seen ob dr yet  [facility]en [facility]  States nausea and vomiting since pregnancy started, they gave her b6 but cant keep anything down  States recently vomiting blood  Some abd pain no vaginal bleeding,      19 y/o female  currently 8 weeks pregnant presenting today with nausea and vomiting during her pregnancy, states that this has been present throughout her pregnancy, was just seen at [facility] yesterday with an ultrasound showing a "normal "pregnancy according to patient  Has an appointment with OB/GYN at the 12-week pierce's  She was prescribed vitamin B6 for nausea and vomiting however has not attempted to take this medication  States that on occasion she has had some small streaks of blood in her vomit and will vomit harshly  Patient currently has no nausea and is tolerating cheddar sour cream chips actively in the emergency department  Some intermittent abdominal cramping  denies abdominal pain, vaginal bleeding or discharge, fevers, changes in urination  Prior to Admission Medications   Prescriptions Last Dose Informant Patient Reported? Taking? Prenat-FeAsp-Meth-FA-DHA w/o A (Prenate Essential) 18-0 6-0 4-300 MG CAPS Not Taking  No No   Si tab PO QD   Patient not taking: Reported on 2022   Prenatal MV & Min w/FA-DHA (PRENATAL GUMMIES PO)   Yes Yes   Sig: Take by mouth in the morning      Facility-Administered Medications: None       Past Medical History:   Diagnosis Date   • ADHD        History reviewed  No pertinent surgical history  Family History   Problem Relation Age of Onset   • HIV Mother    • Leukemia Other      I have reviewed and agree with the history as documented      E-Cigarette/Vaping   • E-Cigarette Use Former User      E-Cigarette/Vaping Substances   • Nicotine Yes    • THC No    • CBD No    • Flavoring Yes    • Other No • Unknown No      Social History     Tobacco Use   • Smoking status: Never   • Smokeless tobacco: Never   Vaping Use   • Vaping Use: Former   • Substances: Nicotine, Flavoring   Substance Use Topics   • Alcohol use: Not Currently   • Drug use: Yes     Types: Marijuana     Comment: on occ       Review of Systems   Constitutional: Negative  Negative for chills, fatigue and fever  HENT: Negative  Negative for congestion, postnasal drip, rhinorrhea and sore throat  Eyes: Negative  Respiratory: Negative  Negative for cough, shortness of breath and wheezing  Cardiovascular: Negative  Gastrointestinal: Positive for nausea and vomiting  Negative for abdominal distention, abdominal pain, anal bleeding, blood in stool, constipation, diarrhea and rectal pain  Endocrine: Negative  Genitourinary: Negative  Musculoskeletal: Negative  Skin: Negative  Neurological: Negative  Hematological: Negative  Psychiatric/Behavioral: Negative  All other systems reviewed and are negative  Physical Exam  Physical Exam  Vitals and nursing note reviewed  Constitutional:       Appearance: Normal appearance  Comments: Patient actively eating chips   HENT:      Head: Normocephalic and atraumatic  Right Ear: External ear normal       Left Ear: External ear normal       Nose: Nose normal    Eyes:      Conjunctiva/sclera: Conjunctivae normal    Cardiovascular:      Rate and Rhythm: Normal rate  Pulses: Normal pulses  Heart sounds: Normal heart sounds  Pulmonary:      Effort: Pulmonary effort is normal       Breath sounds: Normal breath sounds  Comments: SPO2 is 98% indicating adequate oxygenation  Abdominal:      General: Abdomen is flat  Bowel sounds are normal  There is no distension  Palpations: Abdomen is soft  There is no mass  Tenderness: There is no abdominal tenderness  There is no right CVA tenderness, left CVA tenderness, guarding or rebound        Hernia: No hernia is present  Comments: Active bowel sounds no visible abnormality, abdomen currently nontender   Musculoskeletal:         General: No deformity  Normal range of motion  Cervical back: Normal range of motion  Skin:     General: Skin is warm and dry  Capillary Refill: Capillary refill takes less than 2 seconds  Findings: No rash  Neurological:      General: No focal deficit present  Mental Status: She is alert and oriented to person, place, and time  Mental status is at baseline  Psychiatric:         Mood and Affect: Mood normal          Behavior: Behavior normal          Thought Content: Thought content normal          Judgment: Judgment normal          Vital Signs  ED Triage Vitals [12/07/22 1447]   Temperature Pulse Respirations Blood Pressure SpO2   98 °F (36 7 °C) 95 20 127/58 98 %      Temp Source Heart Rate Source Patient Position - Orthostatic VS BP Location FiO2 (%)   Tympanic Monitor Sitting Right arm --      Pain Score       4           Vitals:    12/07/22 1447   BP: 127/58   Pulse: 95   Patient Position - Orthostatic VS: Sitting         Visual Acuity      ED Medications  Medications   metoclopramide (REGLAN) tablet 10 mg (10 mg Oral Given 12/7/22 1533)       Diagnostic Studies  Results Reviewed     None                 No orders to display              Procedures  Procedures         ED Course                               SBIRT 20yo+    Flowsheet Row Most Recent Value   SBIRT (23 yo +)    In order to provide better care to our patients, we are screening all of our patients for alcohol and drug use  Would it be okay to ask you these screening questions? Yes Filed at: 12/07/2022 1529   Initial Alcohol Screen: US AUDIT-C     1  How often do you have a drink containing alcohol? 0 Filed at: 12/07/2022 1529   2  How many drinks containing alcohol do you have on a typical day you are drinking? 0 Filed at: 12/07/2022 1529   3a  Male UNDER 65:  How often do you have five or more drinks on one occasion? 0 Filed at: 12/07/2022 1529   3b  FEMALE Any Age, or MALE 65+: How often do you have 4 or more drinks on one occassion? 0 Filed at: 12/07/2022 1526   Audit-C Score 0 Filed at: 12/07/2022 1529   KELSEY: How many times in the past year have you    Used an illegal drug or used a prescription medication for non-medical reasons? Never Filed at: 12/07/2022 1529                    [facility]  Number of Diagnoses or Management Options  Nausea and vomiting in pregnancy  Diagnosis management comments: Patient appears well no distress, encourage patient utilize vitamins B6, should she have persistent nausea and vomiting will prescribe Reglan  Patient is informed to return to the emergency department for worsening of symptoms and was given proper education regarding their diagnosis and symptoms  Otherwise the patient is informed to follow up with their primary care doctor/obgyn for re-evaluation  The patient verbalizes understanding and agrees with above assessment and plan  All questions were answered  Please Note: Fluency Direct voice recognition software may have been used in the creation of this document  Wrong words or sound a like substitutions may have occurred due to the inherent limitations of the voice software  Amount and/or Complexity of Data Reviewed  Review and summarize past medical records: yes  Independent visualization of images, tracings, or specimens: yes        Disposition  Final diagnoses:   Nausea and vomiting in pregnancy     Time reflects when diagnosis was documented in both MDM as applicable and the Disposition within this note     Time User Action Codes Description Comment    12/7/2022  3:32 PM Nancy Luna Add [O21 9] Nausea and vomiting in pregnancy       ED Disposition     ED Disposition   Discharge    Condition   Stable    Date/Time   Wed Dec 7, 2022  3:32 PM    Christian Greer 1948 discharge to home/self care                 Follow-up Information Follow up With Specialties Details Why Contact Info Additional [address] Emergency Department Emergency Medicine Go to  If symptoms worsen, otherwise please follow up with your family doctor/ OBGYN [address] [address] [address] [address] [facility] [id number]          Patient's Medications   Discharge Prescriptions    METOCLOPRAMIDE (REGLAN) 10 MG TABLET    Take 1 tablet (10 mg total) by mouth every 6 (six) hours       Start Date: 12/7/2022 End Date: --       Order Dose: 10 mg       Quantity: 15 tablet    Refills: 0       No discharge procedures on file      PDMP Review     None          ED Provider  Electronically Signed by           Fabiano Mcdonnell PA-C  12/07/22 8721

## 2022-12-30 ENCOUNTER — INITIAL PRENATAL (OUTPATIENT)
Dept: OBGYN CLINIC | Facility: CLINIC | Age: 22
End: 2022-12-30

## 2022-12-30 VITALS
DIASTOLIC BLOOD PRESSURE: 80 MMHG | BODY MASS INDEX: 19.59 KG/M2 | HEIGHT: 67 IN | WEIGHT: 124.8 LBS | SYSTOLIC BLOOD PRESSURE: 120 MMHG

## 2022-12-30 DIAGNOSIS — Z3A.12 12 WEEKS GESTATION OF PREGNANCY: Primary | ICD-10-CM

## 2022-12-30 NOTE — ASSESSMENT & PLAN NOTE
- Patient encouraged to try to take PNV  - Ultrasounds: Viable IUP consistent with LMP  - Genetics: MFM referral   - Labs: Prenatal panel ordered  - Tdap: Will offer after 27 weeks gestation  - Flu Shot: Deferred  - COVID: Declined  - Delivery:  without epidural; patient initially wanted a home water birth with was strongly discouraged; She stated that she wants a [place] baby, not a [place] baby     - Contraception: TBD  - Breastfeeding: TBD   - RTO in 4 weeks for part 2

## 2022-12-30 NOTE — PROGRESS NOTES
OB/GYN  Initial prenatal visit  Karin Pill  2022  5:27 PM  Dr Leland Engle MD      SUBJECTIVE  Patient is a 20yo  at [address] [place] [address] here for initial prenatal H&P  This is a surprise pregnancy but desired pregnancy  Patient is currently doing well  She reports nausea and vomiting but she was given medication in the ED which helps when she remembers to take it  She is here with her mother  This is her first pregnancy  She currently works at [employer]  She has a good support system at home  She denies hx of STD/STI, denies a hx of TB or close contacts with persons with TB  She denies a family history of inheritable conditions such as physical or intellectual disabilities, birth defects, blood disorders, heart or neural tube defects  She has not had MRSA  She denies recent travel or travel planned in the near future  She denies use of nicotine, tobacco or alcohol  She is currently smoking 1/2 blunt per day of marijuana for appetite stimulation  She denies vaginal bleeding, cramping, leakage, abnormal discharge  OB History    Para Term  AB Living   1 0 0 0 0 0   SAB IAB Ectopic Multiple Live Births   0 0 0 0 0      # Outcome Date GA Lbr Alvaro/2nd Weight Sex Delivery Anes PTL Lv   1 Current              Review of Systems   Constitutional: Positive for appetite change, fatigue and unexpected weight change (weight loss)  Negative for fever  Gastrointestinal: Positive for diarrhea (here and there), nausea and vomiting  Musculoskeletal: Positive for back pain (all the time)  Neurological: Positive for dizziness (when getting up too fast)  All other systems reviewed and are negative  Past Medical History:   Diagnosis Date   • ADHD      History reviewed  No pertinent surgical history      Social History     Socioeconomic History   • Marital status: Single     Spouse name: Not on file   • Number of children: Not on file   • Years of education: Not on file   • Highest education level: Not on file   Occupational History   • Not on file   Tobacco Use   • Smoking status: Never   • Smokeless tobacco: Never   Vaping Use   • Vaping Use: Former   • Substances: Nicotine, Flavoring   Substance and Sexual Activity   • Alcohol use: Not Currently   • Drug use: Yes     Types: Marijuana     Comment: on occ   • Sexual activity: Yes     Partners: Male     Birth control/protection: None   Other Topics Concern   • Not on file   Social History Narrative   • Not on file     Social Determinants of Health     Financial Resource Strain: Not on file   Food Insecurity: Not on file   Transportation Needs: Not on file   Physical Activity: Not on file   Stress: Not on file   Social Connections: Not on file   Intimate Partner Violence: Not on file   Housing Stability: Not on file       OBJECTIVE  Vitals:    22 1433   BP: 120/80     Physical Exam  Vitals reviewed  Exam conducted with a chaperone present  Constitutional:       General: She is not in acute distress  Appearance: She is not ill-appearing, toxic-appearing or diaphoretic  Cardiovascular:      Rate and Rhythm: Normal rate  Pulmonary:      Effort: Pulmonary effort is normal  No respiratory distress  Abdominal:      Palpations: Abdomen is soft  Genitourinary:     Comments: Patient declined exam today  Skin:     General: Skin is warm and dry  Neurological:      Mental Status: She is alert and oriented to person, place, and time  Mental status is at baseline  Psychiatric:         Mood and Affect: Mood normal          Behavior: Behavior normal          Thought Content:  Thought content normal          Judgment: Judgment normal        Patient declined transvaginal ultrasound  Transabdominal ultrasound showed viable IUP at 12 weeks consistent with LMP  See US report                ASSESSMENT AND PLAN    20 y o , , with /80 (BP Location: Left arm, Patient Position: Sitting)   Ht 5' 7" (1 702 m)   Wt 56 6 kg (124 lb 12 8 oz)   LMP 10/04/2022 , at [facility] here for her first prenatal visit    Problem List Items Addressed This Visit        Unprioritized    12 weeks gestation of pregnancy - Primary     - Patient encouraged to try to take PNV  - Ultrasounds: Viable IUP consistent with LMP  - Genetics: MFM referral   - Labs: Prenatal panel ordered  - Tdap: Will offer after 27 weeks gestation  - Flu Shot: Deferred  - COVID: Declined  - Delivery:  without epidural; patient initially wanted a home water birth with was strongly discouraged; She stated that she wants a [place] baby, not a [place] baby     - Contraception: TBD  - Breastfeeding: TBD   - RTO in 4 weeks for part 2         Relevant Orders    AMB US OB < 14 weeks single or first gestation level 1 (Completed)       Do Triplett MD  2022  5:27 PM

## 2023-01-03 DIAGNOSIS — Z3A.12 12 WEEKS GESTATION OF PREGNANCY: Primary | ICD-10-CM

## 2023-01-09 ENCOUNTER — ROUTINE PRENATAL (OUTPATIENT)
Facility: HOSPITAL | Age: 23
End: 2023-01-09
Attending: OBSTETRICS & GYNECOLOGY

## 2023-01-09 VITALS
DIASTOLIC BLOOD PRESSURE: 70 MMHG | HEART RATE: 102 BPM | WEIGHT: 123.2 LBS | HEIGHT: 67 IN | BODY MASS INDEX: 19.34 KG/M2 | SYSTOLIC BLOOD PRESSURE: 102 MMHG

## 2023-01-09 DIAGNOSIS — O21.0 HYPEREMESIS AFFECTING PREGNANCY, ANTEPARTUM: Primary | ICD-10-CM

## 2023-01-09 DIAGNOSIS — K21.9 GASTROESOPHAGEAL REFLUX IN PREGNANCY IN FIRST TRIMESTER: ICD-10-CM

## 2023-01-09 DIAGNOSIS — O99.611 GASTROESOPHAGEAL REFLUX IN PREGNANCY IN FIRST TRIMESTER: ICD-10-CM

## 2023-01-09 DIAGNOSIS — Z3A.12 12 WEEKS GESTATION OF PREGNANCY: ICD-10-CM

## 2023-01-09 DIAGNOSIS — Z36.82 NUCHAL TRANSLUCENCY OF FETUS ON PRENATAL ULTRASOUND: ICD-10-CM

## 2023-01-09 RX ORDER — DIPHENHYDRAMINE HYDROCHLORIDE 25 MG/1
25 CAPSULE ORAL EVERY 8 HOURS PRN
COMMUNITY

## 2023-01-09 RX ORDER — FAMOTIDINE 20 MG/1
20 TABLET, FILM COATED ORAL 2 TIMES DAILY
Qty: 60 TABLET | Refills: 7 | Status: SHIPPED | OUTPATIENT
Start: 2023-01-09

## 2023-01-09 NOTE — PROGRESS NOTES
Patient chose to have [facility] Non-invasive Prenatal Screen with fetal sex  Patient given brochure and is aware [facility] will contact their insurance and coordinate coverage  Patient made aware she will need to respond to text message or e-mail from [employer] within 2 business days or testing will be run through insurance  Patient informed text message will come from area code  "415"  Provided [employer] # [phone] and web site : [email or web address]    "[place] your test online" card with barcode and test tube ID provided to patient  Reviewed [facility]'s web site states 5-7 business days for results via their portal    "[employer] message will be sent to patient when MFM receives results /provider reviews  2 vials of blood drawn from left arm by Dwight Lentz RN  Patient tolerated blood draw without difficulty  Specimens labeled with patient identifiers (name, date of birth, specimen collection date), order and specimen were verified with patient, packed and sent via [employer]  Copy of lab order scanned to Epic media  Maternal Fetal Medicine will have results in approximately 7-10 business days and will call patient or notify via [address]  Patient aware viewing lab result online will reveal fetal sex if ordered  Patient verbalized understanding of all instructions and no questions at this time

## 2023-01-09 NOTE — LETTER
2023     Eder Ramsey, [address] [facility]  [address] [address]    Patient: Jessica Maldonado   YOB: 2000   Date of Visit: 2023       Dear Dr Nuria Varela: Thank you for referring Alexandra Shearer to me for evaluation  Below are my notes for this consultation  If you have questions, please do not hesitate to call me  I look forward to following your patient along with you  Sincerely,        Ce Mullen MD        CC: No Recipients  Ce Mullen MD  2023  5:29 PM  Sign when Signing Visit  OFFICE CONSULT  Referring physician:   Eder Ramsey Md  [facility]  [address] [facility],  [address] [place] [address]      Dear Dr Nuria Varela      Thank you for requesting a  consultation on your patient Ms  Jessica Maldonado for the following indications:  Genetic screening    Questionnaire reports that she is experiencing weight loss, vomiting and cough  She reports symptoms of hyperemesis with a 12 pound weight loss and coughing seem associated with an increased risk to then start to vomit  History  Medications: Vitamin B6 25 mg every 8 hours as needed for nausea, Reglan 10 mg every 6 hours as needed for nausea and prenatal Gummies  Allergies to medications: Latex causes a rash  Past medical history: Hyperemesis  She reports prepregnancy weight of 135 and is now 123 lbs  She currently was told to use vitamin B6 every 8 hours and Reglan 10 mg every 6 hours as needed  Her mother who is with her today states she is not using these medications as frequently as recommended  She reports using marijuana to increase her appetite  Past surgical history: None  Past obstetrical history:  1 para 0  Social history: Reports using marijuana to increase her appetite  Denies use of alcohol or cigarettes  First generation family history: Her mother reports that Dennis Erazo was a  delivery    Ultrasound findings:  The ultrasound shows a fetus concordant with dates  The nasal bone and nuchal translucency appears normal  No malformations are seen on today's early ultrasound  The patient was informed of the findings and counseled about the limitations of the exam in detecting all forms of fetal congenital abnormalities  She does not report any vaginal bleeding or uterine cramping or contractions  Specific counseling was provided on the following problems:  1  We discussed the options for genetic screening which include invasive testing on the fetal placenta or on fetal skin cells within the amniotic fluid and compared this to noninvasive testing which includes cell free DNA screening and the sequential screen  We reviewed the risks, the benefits and the limitations of each  In the end patient chose to complete the cell free DNA screen  2  Consult for hyperemesis-over the last 9 days she is no longer losing significant weight and is rather stable  She does not appear dehydrated  Recommend she supplement with Unisom 12 5 mg every 8 hours as needed along with her vitamin B6  I also recommend Pepcid 20 mg 30 minutes before breakfast and dinner as this prevomiting cough may be secondary to reflux  3  Marijuana use in pregnancy has been associated with a condition called cannabis hyperemesis syndrome which is a condition caused by long-term marijuana use  It could be associated with nausea, vomiting dehydration and abdominal pain  I recommend she stop the use of marijuana in pregnancy  4  While there is no high-quality evidence to suggest an increase in congenital anomalies among marijuana users, the findings from available studies are limited by relatively small numbers of women whose used only cannabis  Concerning trends suggest a negative impact on pregnancy outcomes in women who use marijuana  Therefore the [address] [facility] and Gynecologists (ACOG) discourages marijuana use during pregnancy   Similarly, while data are limited regarding effects of marijuana use during lactation, ACOG and the Academy of Breastfeeding Medicine discourage marijuana use during breastfeeding  Marijuana has not been shown to affect general IQ, but it has been associated with deficits in problem-solving skills and deficits in learning and memory  However, it is difficult to ascertain whether these findings were directly due to prenatal exposure to marijuana or other confounding variables such as home environment or exposure to other drugs of abuse, as these were not controlled studies  5  She has risk factors to develop preeclampsia in pregnancy  Recommend she start baby aspirin 162 mg daily till 36 weeks once her symptoms of hyperemesis resolve  Future tests recommended:  1  The results of her NIPT will return in 7-10 days and her OB office will order an MSAFP screen at 16-18 weeks to screen for spina bifida  Future ultrasounds ordered today:   1  Fetal Level II ultrasound imaging is recommended at 19-20 weeks' gestation  The face to face time, in addition to time spent discussing ultrasound results, was approximately 30 minutes, greater than 50% of which was spent during counseling and coordination of care      Luz Maria Man MD

## 2023-01-09 NOTE — PROGRESS NOTES
OFFICE CONSULT  Referring physician:   Brittaney Long Md  [address]  [address] [employer] [address]      Dear Dr Akosua Garcia      Thank you for requesting a  consultation on your patient Ms Richard Velazquez for the following indications:  Genetic screening    Questionnaire reports that she is experiencing weight loss, vomiting and cough  She reports symptoms of hyperemesis with a 12 pound weight loss and coughing seem associated with an increased risk to then start to vomit  History  Medications: Vitamin B6 25 mg every 8 hours as needed for nausea, Reglan 10 mg every 6 hours as needed for nausea and prenatal Gummies  Allergies to medications: Latex causes a rash  Past medical history: Hyperemesis  She reports prepregnancy weight of 135 and is now 123 lbs  She currently was told to use vitamin B6 every 8 hours and Reglan 10 mg every 6 hours as needed  Her mother who is with her today states she is not using these medications as frequently as recommended  She reports using marijuana to increase her appetite  Past surgical history: None  Past obstetrical history:  1 para 0  Social history: Reports using marijuana to increase her appetite  Denies use of alcohol or cigarettes  First generation family history: Her mother reports that Ihsan Bernstein was a  delivery    Ultrasound findings: The ultrasound shows a fetus concordant with dates  The nasal bone and nuchal translucency appears normal  No malformations are seen on today's early ultrasound  The patient was informed of the findings and counseled about the limitations of the exam in detecting all forms of fetal congenital abnormalities  She does not report any vaginal bleeding or uterine cramping or contractions  Specific counseling was provided on the following problems:  1   We discussed the options for genetic screening which include invasive testing on the fetal placenta or on fetal skin cells within the amniotic fluid and compared this to noninvasive testing which includes cell free DNA screening and the sequential screen  We reviewed the risks, the benefits and the limitations of each  In the end patient chose to complete the cell free DNA screen  2  Consult for hyperemesis-over the last 9 days she is no longer losing significant weight and is rather stable  She does not appear dehydrated  Recommend she supplement with Unisom 12 5 mg every 8 hours as needed along with her vitamin B6  I also recommend Pepcid 20 mg 30 minutes before breakfast and dinner as this prevomiting cough may be secondary to reflux  3  Marijuana use in pregnancy has been associated with a condition called cannabis hyperemesis syndrome which is a condition caused by long-term marijuana use  It could be associated with nausea, vomiting dehydration and abdominal pain  I recommend she stop the use of marijuana in pregnancy  4  While there is no high-quality evidence to suggest an increase in congenital anomalies among marijuana users, the findings from available studies are limited by relatively small numbers of women whose used only cannabis  Concerning trends suggest a negative impact on pregnancy outcomes in women who use marijuana  Therefore the [address] [facility] and Gynecologists (ACOG) discourages marijuana use during pregnancy  Similarly, while data are limited regarding effects of marijuana use during lactation, ACOG and the Academy of Breastfeeding Medicine discourage marijuana use during breastfeeding  Marijuana has not been shown to affect general IQ, but it has been associated with deficits in problem-solving skills and deficits in learning and memory  However, it is difficult to ascertain whether these findings were directly due to prenatal exposure to marijuana or other confounding variables such as home environment or exposure to other drugs of abuse, as these were not controlled studies    5  She has risk factors to develop preeclampsia in pregnancy  Recommend she start baby aspirin 162 mg daily till 36 weeks once her symptoms of hyperemesis resolve  Future tests recommended:  1  The results of her NIPT will return in 7-10 days and her OB office will order an MSAFP screen at 16-18 weeks to screen for spina bifida  Future ultrasounds ordered today:   1  Fetal Level II ultrasound imaging is recommended at 19-20 weeks' gestation  The face to face time, in addition to time spent discussing ultrasound results, was approximately 30 minutes, greater than 50% of which was spent during counseling and coordination of care      Noralyn Goodpasture MD

## 2023-01-23 ENCOUNTER — ROUTINE PRENATAL (OUTPATIENT)
Dept: OBGYN CLINIC | Facility: CLINIC | Age: 23
End: 2023-01-23

## 2023-01-23 VITALS — DIASTOLIC BLOOD PRESSURE: 62 MMHG | BODY MASS INDEX: 20.67 KG/M2 | SYSTOLIC BLOOD PRESSURE: 105 MMHG | WEIGHT: 132 LBS

## 2023-01-23 DIAGNOSIS — Z34.92 PRENATAL CARE IN SECOND TRIMESTER: Primary | ICD-10-CM

## 2023-01-23 NOTE — PROGRESS NOTES
Patient reports no fm, no n/v, , bleeding, loss of fluid, edema, dom violence  latoya pnv occasional headache one-sided discussed, occasional round ligament pain/cramping, patient is smoking  Did not review today  Patient has  center follow-up scheduled  Reviewed NIPT  Reviewed with patient first trimester labs  Reordered for patient along with AFP patient counseled that AFP is to be done between 16 and 20 weeks ideally  Return in 4 weeks or sooner as needed    Nausea vomiting has significantly improved (niece is here today with her patient counseled no children allowed for visit at [address] [place] [address])

## 2023-02-01 ENCOUNTER — HOSPITAL ENCOUNTER (EMERGENCY)
Facility: HOSPITAL | Age: 23
Discharge: HOME | End: 2023-02-01

## 2023-02-01 VITALS
TEMPERATURE: 97 F | HEART RATE: 99 BPM | DIASTOLIC BLOOD PRESSURE: 67 MMHG | SYSTOLIC BLOOD PRESSURE: 125 MMHG | RESPIRATION RATE: 20 BRPM | BODY MASS INDEX: 20.44 KG/M2 | WEIGHT: 130.51 LBS | OXYGEN SATURATION: 99 %

## 2023-02-01 DIAGNOSIS — J02.9 PHARYNGITIS, UNSPECIFIED ETIOLOGY: Primary | ICD-10-CM

## 2023-02-01 LAB
FLUAV RNA RESP QL NAA+PROBE: NEGATIVE
FLUBV RNA RESP QL NAA+PROBE: NEGATIVE
RSV RNA RESP QL NAA+PROBE: NEGATIVE
S PYO DNA THROAT QL NAA+PROBE: NOT DETECTED
SARS-COV-2 RNA RESP QL NAA+PROBE: NEGATIVE

## 2023-02-01 RX ORDER — AMOXICILLIN 500 MG/1
500 CAPSULE ORAL EVERY 12 HOURS SCHEDULED
Qty: 20 CAPSULE | Refills: 0 | Status: SHIPPED | OUTPATIENT
Start: 2023-02-01 | End: 2023-02-11

## 2023-02-01 NOTE — ED NOTES
On phone face timing with someone throughout triage, also unaware which medications she is still taking when asked       Lili Patel RN  02/01/23 2471

## 2023-02-01 NOTE — ED PROVIDER NOTES
History  Chief Complaint   Patient presents with   • Sore Throat     Sore throat for a couple of days with cough, 5 months pregnant     PT is a 21 yo F with PMH of ADHD, GERD, currently 5 months pregnant, who presents to the ED for evaluation of sore throat  Pt states her symptoms began 3 days ago and have continued to worsen  Today she is having difficulty swallowing 2/2 pain  She specifically denies fever, chills, headache, nausea, vomiting, sinus congestion or rhinorrhea, stridor or difficulty breathing  History provided by:  Patient  Sore Throat  Location:  Posterior  Quality:  Burning and sharp  Severity:  Moderate  Onset quality:  Gradual  Duration:  3 days  Timing:  Constant  Progression:  Worsening  Chronicity:  New  Relieved by:  Nothing  Worsened by:  Drinking, eating and swallowing  Ineffective treatments:  Acetaminophen  Associated symptoms: trouble swallowing    Associated symptoms: no abdominal pain, no adenopathy, no chest pain, no chills, no cough, no drooling, no ear discharge, no ear pain, no epistaxis, no eye discharge, no fever, no headaches, no neck stiffness, no night sweats, no plugged ear sensation, no postnasal drip, no rash, no rhinorrhea, no shortness of breath, no sinus congestion, no stridor and no voice change    Risk factors: no exposure to strep, no exposure to mono, no sick contacts, no recent dental procedure, no recent endoscopy and no recent ENT procedure        Prior to Admission Medications   Prescriptions Last Dose Informant Patient Reported? Taking? Prenatal MV & Min w/FA-DHA (PRENATAL GUMMIES PO)   Yes No   Sig: Take by mouth in the morning   Pyridoxine HCl (vitamin B-6) 25 MG tablet   Yes No   Sig: Take 25 mg by mouth every 8 (eight) hours as needed for nausea   doxylamine (UNISOM) 25 MG tablet Not Taking  No No   Sig: Take 0 5 tablets (12 5 mg total) by mouth every 8 (eight) hours as needed for sleep Take with 25 mg of vit b6    Every 8 hrs as needed   Patient not taking: Reported on 2/1/2023   famotidine (PEPCID) 20 mg tablet   No No   Sig: Take 1 tablet (20 mg total) by mouth 2 (two) times a day   metoclopramide (Reglan) 10 mg tablet   No No   Sig: Take 1 tablet (10 mg total) by mouth every 6 (six) hours      Facility-Administered Medications: None       Past Medical History:   Diagnosis Date   • ADHD        History reviewed  No pertinent surgical history  Family History   Problem Relation Age of Onset   • HIV Mother    • Leukemia Other      I have reviewed and agree with the history as documented  E-Cigarette/Vaping   • E-Cigarette Use Former User      E-Cigarette/Vaping Substances   • Nicotine Yes    • THC No    • CBD No    • Flavoring Yes    • Other No    • Unknown No      Social History     Tobacco Use   • Smoking status: Never   • Smokeless tobacco: Never   Vaping Use   • Vaping Use: Former   • Substances: Nicotine, Flavoring   Substance Use Topics   • Alcohol use: Not Currently   • Drug use: Yes     Types: Marijuana     Comment: on occ       Review of Systems   Constitutional: Negative for chills, fever and night sweats  HENT: Positive for sore throat and trouble swallowing  Negative for drooling, ear discharge, ear pain, nosebleeds, postnasal drip, rhinorrhea and voice change  Eyes: Negative for pain, discharge and visual disturbance  Respiratory: Negative for cough, shortness of breath and stridor  Cardiovascular: Negative for chest pain and palpitations  Gastrointestinal: Negative for abdominal pain and vomiting  Endocrine: Negative  Genitourinary: Negative for dysuria and hematuria  Musculoskeletal: Negative for arthralgias, back pain and neck stiffness  Skin: Negative for color change and rash  Allergic/Immunologic: Negative  Neurological: Negative for seizures, syncope and headaches  Hematological: Negative  Negative for adenopathy  Psychiatric/Behavioral: Negative      All other systems reviewed and are negative  Physical Exam  Physical Exam  Vitals and nursing note reviewed  Constitutional:       General: She is not in acute distress  Appearance: She is well-developed  She is not ill-appearing or diaphoretic  HENT:      Head: Normocephalic and atraumatic  Right Ear: Tympanic membrane and ear canal normal       Left Ear: Tympanic membrane and ear canal normal       Nose: No congestion  Mouth/Throat:      Mouth: Mucous membranes are moist       Pharynx: Uvula midline  Posterior oropharyngeal erythema present  No pharyngeal swelling, oropharyngeal exudate or uvula swelling  Tonsils: No tonsillar exudate or tonsillar abscesses  1+ on the right  1+ on the left  Eyes:      Conjunctiva/sclera: Conjunctivae normal       Pupils: Pupils are equal, round, and reactive to light  Cardiovascular:      Rate and Rhythm: Normal rate and regular rhythm  Heart sounds: No murmur heard  Pulmonary:      Effort: Pulmonary effort is normal  No respiratory distress  Breath sounds: Normal breath sounds  Abdominal:      Palpations: Abdomen is soft  Tenderness: There is no abdominal tenderness  Musculoskeletal:         General: No swelling  Cervical back: Normal range of motion and neck supple  Skin:     General: Skin is warm and dry  Capillary Refill: Capillary refill takes less than 2 seconds  Neurological:      Mental Status: She is alert     Psychiatric:         Mood and Affect: Mood normal          Vital Signs  ED Triage Vitals [02/01/23 1241]   Temperature Pulse Respirations Blood Pressure SpO2   (!) 97 °F (36 1 °C) 99 20 125/67 99 %      Temp Source Heart Rate Source Patient Position - Orthostatic VS BP Location FiO2 (%)   Tympanic Monitor Sitting Right arm --      Pain Score       8           Vitals:    02/01/23 1241   BP: 125/67   Pulse: 99   Patient Position - Orthostatic VS: Sitting         Visual Acuity      ED Medications  Medications - No data to display    Diagnostic Studies  Results Reviewed     Procedure Component Value Units Date/Time    COVID/FLU/RSV [[id number]]  (Normal) Collected: 02/01/23 1335    Lab Status: Final result Specimen: Nares from Nose Updated: 02/01/23 1427     SARS-CoV-2 Negative     INFLUENZA A PCR Negative     INFLUENZA B PCR Negative     RSV PCR Negative    Narrative:      FOR PEDIATRIC PATIENTS - copy/paste COVID Guidelines URL to browser: [email or web address] ([email or web address]  [employer]    SARS-CoV-2 assay is a Nucleic Acid Amplification assay intended for the  qualitative detection of nucleic acid from SARS-CoV-2 in nasopharyngeal  swabs  Results are for the presumptive identification of SARS-CoV-2 RNA  Positive results are indicative of infection with SARS-CoV-2, the virus  causing COVID-19, but do not rule out bacterial infection or co-infection  with other viruses  Laboratories within the [place] and its  territories are required to report all positive results to the appropriate  public health authorities  Negative results do not preclude SARS-CoV-2  infection and should not be used as the sole basis for treatment or other  patient management decisions  Negative results must be combined with  clinical observations, patient history, and epidemiological information  This test has not been FDA cleared or approved  This test has been authorized by FDA under an Emergency Use Authorization  (EUA)  This test is only authorized for the duration of time the  declaration that circumstances exist justifying the authorization of the  emergency use of an in vitro diagnostic tests for detection of SARS-CoV-2  virus and/or diagnosis of COVID-19 infection under section 564(b)(1) of  the Act, 21 U  S C  270WGT-6(P)(2), unless the authorization is terminated  or revoked sooner  The test has been validated but independent review by FDA  and CLIA is pending  Test performed using Cepheid GeneXpert:  This RT-PCR assay targets N2,  a region unique to SARS-CoV-2  A conserved region in the E-gene was chosen  for pan-Sarbecovirus detection which includes SARS-CoV-2  According to CMS-2020-01-R, this platform meets the definition of high-throughput technology  Strep A PCR [[id number]]  (Normal) Collected: 02/01/23 1335    Lab Status: Final result Specimen: Throat Updated: 02/01/23 1415     STREP A PCR Not Detected                 No orders to display              Procedures  Procedures         ED Course                                             Medical Decision Making  Pharyngitis, unspecified etiology: acute illness or injury     Details: acute pharangitis  unclear etiology - likely viral but consider strep  covid/flu/rsv and strep testing pending  pt provided with prescription for abx and instructed to start abx if strep is positive  Amount and/or Complexity of Data Reviewed  External Data Reviewed: notes  Labs: ordered  Decision-making details documented in ED Course  Risk  Prescription drug management  Disposition  Final diagnoses:   Pharyngitis, unspecified etiology     Time reflects when diagnosis was documented in both MDM as applicable and the Disposition within this note     Time User Action Codes Description Comment    2/1/2023  1:30 PM Ty Neigh Add [J02 0] Strep pharyngitis     2/1/2023  1:31 PM Destiny Booth Remove [J02 0] Strep pharyngitis     2/1/2023  1:31 PM Ty Neigh Add [J02 9] Pharyngitis, unspecified etiology       ED Disposition     ED Disposition   Discharge    Condition   Stable    Date/Time   Wed Feb 1, 2023  1:30 PM    Comment   Leeanne Hashimoto Johnson discharge to home/self care                 Follow-up Information     Follow up With Specialties Details Why Contact Info Additional Information    Teresa Ardon MD Internal Medicine Call  As needed [employer]  [address] [place] [address] [facility]       [address] [place] [address] Emergency Department Emergency Medicine Go to  If symptoms worsen [address] [address] Emergency Department, Chuck [place], [place], [id number]          Discharge Medication List as of 2/1/2023  1:32 PM      START taking these medications    Details   amoxicillin (AMOXIL) 500 mg capsule Take 1 capsule (500 mg total) by mouth every 12 (twelve) hours for 10 days, Starting Wed 2/1/2023, Until Sat 2/11/2023, Normal         CONTINUE these medications which have NOT CHANGED    Details   doxylamine (UNISOM) 25 MG tablet Take 0 5 tablets (12 5 mg total) by mouth every 8 (eight) hours as needed for sleep Take with 25 mg of vit b6  Every 8 hrs as needed, Starting Mon 1/9/2023, Until Fri 3/10/2023 at 2359, Normal      famotidine (PEPCID) 20 mg tablet Take 1 tablet (20 mg total) by mouth 2 (two) times a day, Starting Mon 1/9/2023, Normal      metoclopramide (Reglan) 10 mg tablet Take 1 tablet (10 mg total) by mouth every 6 (six) hours, Starting Wed 12/7/2022, Normal      Prenatal MV & Min w/FA-DHA (PRENATAL GUMMIES PO) Take by mouth in the morning, Historical Med      Pyridoxine HCl (vitamin B-6) 25 MG tablet Take 25 mg by mouth every 8 (eight) hours as needed for nausea, Historical Med             No discharge procedures on file      PDMP Review     None          ED Provider  Electronically Signed by           Sandi Boateng PA-C  02/02/23 8188

## 2023-02-01 NOTE — Clinical Note
Rafa Dick was seen and treated in our emergency department on 2/1/2023  Diagnosis:     Zhao Fiore  may return to work on return date  She may return on this date: 02/03/2023         If you have any questions or concerns, please don't hesitate to call        Oseas Del Rosario PA-C    ______________________________           _______________          _______________  Hospital Representative                              Date                                Time

## 2023-02-19 LAB
2ND TRIMESTER 4 SCREEN SERPL-IMP: NORMAL
AFP ADJ MOM SERPL: 0.81
AFP INTERP AMN-IMP: NORMAL
AFP INTERP SERPL-IMP: NORMAL
AFP INTERP SERPL-IMP: NORMAL
AFP SERPL-MCNC: 47.3 NG/ML
AGE AT DELIVERY: 23.8 YR
GA METHOD: NORMAL
GA: 19.4 WEEKS
IDDM PATIENT QL: NO
MULTIPLE PREGNANCY: NO
NEURAL TUBE DEFECT RISK FETUS: NORMAL %

## 2023-02-22 NOTE — PATIENT INSTRUCTIONS
Thank you for choosing us for your  care today  If you have any questions about your ultrasound or care, please do not hesitate to contact us or your primary obstetrician  Some general instructions for your pregnancy are:    Exercise: Aim for 22 minutes per day (150 minutes per week) of regular exercise  Walking is great! Nutrition: Choose healthy sources of calcium, iron, and protein  Learn about Preeclampsia: preeclampsia is a common, serious high blood pressure complication in pregnancy  A blood pressure of 498CHVS (systolic or top number) or 86UJMM (diastolic or bottom number) is not normal and needs evaluation by your doctor  Aspirin is sometimes prescribed in early pregnancy to prevent preeclampsia in women with risk factors - ask your obstetrician if you should be on this medication  For more resources, visit:  [employer]  If you smoke, try to reduce how many cigarettes you smoke or try to quit completely  Do not vape  Other warning signs to watch out for in pregnancy or postpartum: chest pain, obstructed breathing or shortness of breath, seizures, thoughts of hurting yourself or your baby, bleeding, a painful or swollen leg, fever, or headache (see [employer] POST-BIRTH Warning Signs campaign)  If these happen call 911  Itching is also not normal in pregnancy and if you experience this, especially over your hands and feet, potentially worse at night, notify your doctors

## 2023-02-23 LAB
CF COMMENT: NORMAL
CFTR MUT ANL BLD/T: NORMAL
HCV AB S/CO SERPL IA: NON REACTIVE
HGB A MFR BLD: 2.6 % (ref 1.8–3.2)
HGB A MFR BLD: 97.4 % (ref 96.4–98.8)
HGB F MFR BLD: 0 % (ref 0–2)
HGB FRACT BLD-IMP: NORMAL
HGB S MFR BLD: 0 %
HIV 1+2 AB+HIV1 P24 AG SERPL QL IA: NON REACTIVE

## 2023-02-24 ENCOUNTER — ROUTINE PRENATAL (OUTPATIENT)
Facility: HOSPITAL | Age: 23
End: 2023-02-24

## 2023-02-24 VITALS
HEART RATE: 74 BPM | WEIGHT: 137.4 LBS | HEIGHT: 67 IN | DIASTOLIC BLOOD PRESSURE: 62 MMHG | SYSTOLIC BLOOD PRESSURE: 120 MMHG | BODY MASS INDEX: 21.56 KG/M2

## 2023-02-24 DIAGNOSIS — Z36.3 ENCOUNTER FOR ANTENATAL SCREENING FOR MALFORMATIONS: Primary | ICD-10-CM

## 2023-02-24 DIAGNOSIS — Z36.86 ENCOUNTER FOR ANTENATAL SCREENING FOR CERVICAL LENGTH: ICD-10-CM

## 2023-02-25 NOTE — PROGRESS NOTES
[address]: Ms Evelia Randle was seen today for anatomic survey and cervical length screening ultrasound  See ultrasound report under "OB Procedures" tab  The time spent on this established patient on the encounter date included 5 minutes previsit service time reviewing records and precharting, 5 minutes face-to-face service time counseling regarding results and coordinating care, and  4 minutes charting, totalling 14 minutes  Please don't hesitate to contact our office with any concerns or questions    Liz Dale MD

## 2023-02-28 LAB
ABO GROUP BLD: ABNORMAL
APPEARANCE UR: CLEAR
BACTERIA UR CULT: ABNORMAL
BACTERIA UR CULT: NORMAL
BACTERIA URNS QL MICRO: ABNORMAL
BASOPHILS # BLD AUTO: 0 X10E3/UL (ref 0–0.2)
BASOPHILS NFR BLD AUTO: 0 %
BILIRUB UR QL STRIP: NEGATIVE
BLD GP AB SCN SERPL QL: NEGATIVE
C TRACH RRNA SPEC QL NAA+PROBE: NEGATIVE
CASTS URNS QL MICRO: ABNORMAL /LPF
CLINICAL INFO: NORMAL
COLOR UR: YELLOW
CRYSTALS URNS MICRO: ABNORMAL
EOSINOPHIL # BLD AUTO: 0.2 X10E3/UL (ref 0–0.4)
EOSINOPHIL NFR BLD AUTO: 1 %
EPI CELLS #/AREA URNS HPF: ABNORMAL /HPF (ref 0–10)
ERYTHROCYTE [DISTWIDTH] IN BLOOD BY AUTOMATED COUNT: 12.9 % (ref 11.7–15.4)
ETHNIC BACKGROUND STATED: NORMAL
GENE MUT TESTED BLD/T: NORMAL
GENERAL COMMENTS:: NORMAL
GLUCOSE UR QL: ABNORMAL
HBV SURFACE AG SERPL QL IA: NEGATIVE
HCT VFR BLD AUTO: 34.6 % (ref 34–46.6)
HCV AB S/CO SERPL IA: NON REACTIVE
HGB BLD-MCNC: 11.8 G/DL (ref 11.1–15.9)
HGB UR QL STRIP: NEGATIVE
HIV 1+2 AB+HIV1 P24 AG SERPL QL IA: NON REACTIVE
IMM GRANULOCYTES # BLD: 0 X10E3/UL (ref 0–0.1)
IMM GRANULOCYTES NFR BLD: 0 %
KETONES UR QL STRIP: NEGATIVE
LAB DIRECTOR NAME PROVIDER: NORMAL
LEUKOCYTE ESTERASE UR QL STRIP: NEGATIVE
LYMPHOCYTES # BLD AUTO: 2.1 X10E3/UL (ref 0.7–3.1)
LYMPHOCYTES NFR BLD AUTO: 18 %
MCH RBC QN AUTO: 30.6 PG (ref 26.6–33)
MCHC RBC AUTO-ENTMCNC: 34.1 G/DL (ref 31.5–35.7)
MCV RBC AUTO: 90 FL (ref 79–97)
MICRO URNS: ABNORMAL
MICRO URNS: ABNORMAL
MONOCYTES # BLD AUTO: 1.1 X10E3/UL (ref 0.1–0.9)
MONOCYTES NFR BLD AUTO: 9 %
N GONORRHOEA RRNA SPEC QL NAA+PROBE: NEGATIVE
NEUTROPHILS # BLD AUTO: 8.3 X10E3/UL (ref 1.4–7)
NEUTROPHILS NFR BLD AUTO: 72 %
NITRITE UR QL STRIP: NEGATIVE
PH UR STRIP: 5.5 [PH] (ref 5–7.5)
PLATELET # BLD AUTO: 246 X10E3/UL (ref 150–450)
PROT UR QL STRIP: NEGATIVE
RBC # BLD AUTO: 3.86 X10E6/UL (ref 3.77–5.28)
RBC #/AREA URNS HPF: ABNORMAL /HPF (ref 0–2)
REASON FOR REFERRAL (NARRATIVE): NORMAL
REF LAB TEST METHOD: NORMAL
RH BLD: POSITIVE
RPR SER QL: NON REACTIVE
RUBV IGG SERPL IA-ACNC: <0.9 INDEX
SL AMB DISCLAIMER: NORMAL
SL AMB GENETIC COUNSELOR: NORMAL
SL AMB INTERPRETATION: NORMAL
SMN1 GENE MUT ANL BLD/T: NORMAL
SP GR UR: 1.02 (ref 1–1.03)
SPECIMEN SOURCE: NORMAL
UNIDENT CRYS URNS QL MICRO: PRESENT
UROBILINOGEN UR STRIP-ACNC: 0.2 MG/DL (ref 0.2–1)
WBC # BLD AUTO: 11.7 X10E3/UL (ref 3.4–10.8)
WBC #/AREA URNS HPF: ABNORMAL /HPF (ref 0–5)

## 2023-03-19 ENCOUNTER — HOSPITAL ENCOUNTER (OUTPATIENT)
Facility: HOSPITAL | Age: 23
Discharge: HOME | End: 2023-03-19
Attending: OBSTETRICS & GYNECOLOGY | Admitting: OBSTETRICS & GYNECOLOGY

## 2023-03-19 VITALS
TEMPERATURE: 98 F | DIASTOLIC BLOOD PRESSURE: 70 MMHG | OXYGEN SATURATION: 100 % | HEART RATE: 93 BPM | RESPIRATION RATE: 18 BRPM | SYSTOLIC BLOOD PRESSURE: 122 MMHG

## 2023-03-19 PROBLEM — Z3A.23 23 WEEKS GESTATION OF PREGNANCY: Status: ACTIVE | Noted: 2022-12-30

## 2023-03-19 PROBLEM — O26.899 ABDOMINAL PAIN DURING PREGNANCY: Status: ACTIVE | Noted: 2023-03-19

## 2023-03-19 PROBLEM — R10.9 ABDOMINAL PAIN DURING PREGNANCY: Status: ACTIVE | Noted: 2023-03-19

## 2023-03-19 LAB
BACTERIA UR QL AUTO: ABNORMAL /HPF
BILIRUB UR QL STRIP: NEGATIVE
CLARITY UR: CLEAR
COLOR UR: YELLOW
GLUCOSE UR STRIP-MCNC: NEGATIVE MG/DL
HGB UR QL STRIP.AUTO: NEGATIVE
KETONES UR STRIP-MCNC: NEGATIVE MG/DL
LEUKOCYTE ESTERASE UR QL STRIP: ABNORMAL
NITRITE UR QL STRIP: NEGATIVE
NON-SQ EPI CELLS URNS QL MICRO: ABNORMAL /HPF
PH UR STRIP.AUTO: 7 [PH] (ref 4.5–8)
PROT UR STRIP-MCNC: NEGATIVE MG/DL
RBC #/AREA URNS AUTO: ABNORMAL /HPF
SP GR UR STRIP.AUTO: 1.02 (ref 1–1.03)
UROBILINOGEN UR QL STRIP.AUTO: 0.2 E.U./DL
WBC #/AREA URNS AUTO: ABNORMAL /HPF

## 2023-03-19 NOTE — PROCEDURES
Dakota Rios, atilio  at 23w5d with an MORENA of 2023, by Last Menstrual Period, was seen at [address] for the following procedure(s): $Procedure Type: US - Transvaginal]      Ultrasound Other  Fetal Presentation: Vertex  Funnel: No  Debris: No  Placenta Location: Posterior  Placenta Previa: No  Vasa Previa: No              Ultrasound Probe Disinfection    A transvaginal ultrasound was performed     Prior to use, disinfection was performed with High Level Disinfection Process (Trophon)    Juan Valencia MD  23  2:02 PM

## 2023-03-19 NOTE — PROGRESS NOTES
L&D Triage Note - OB/GYN  Livia Fitch 20 y o  female MRN: [id number]  Unit/Bed#: LD TRIAGE  Encounter: [id number]      Assessment:  20 y o  Britney Elkins at 23w5d presenting with lower pelvic pain  Patient was determined not to be in  labor  Plan:  1  Lower pelvic pain   Fetal heart tones: 150s bpm   Contractions on toco    Pre-term labor work-up negative   Vaginitis work-up negative   Negative UA   Cervical length of 3 30 cm   Cervical exam: 0/0/-5   Recommended Tylenol, heating pad and belly band  Patient declined Tylenol while she was here  Discharged home with return precautions   D/W Dr Dilcia Wilkinson    ______________________________________________________________________      Chief Complaint: pelvic pain woke me from my sleep    Subjective:  20 y o  Britney Elkisn at 23w5d presenting with lower pelvic pain that woke her up from her sleep  She states that she has continued to have pain since waking up  She endorses that it is a 7 out of 10 but is also able to lift up her 3year-old niece  She states that the pain is sharp and she has tried nothing for this pain  She denies leakage of fluid, vaginal bleeding, and contractions  She is able to feel some fetal movement  ROS:   Review of Systems   Constitutional: Negative for chills and fever  HENT: Negative for ear pain and sore throat  Eyes: Negative for pain and visual disturbance  Respiratory: Negative for cough and shortness of breath  Cardiovascular: Negative for chest pain and palpitations  Gastrointestinal: Negative for abdominal pain and vomiting  Genitourinary: Positive for pelvic pain  Negative for dysuria and hematuria  Musculoskeletal: Negative for arthralgias and back pain  Skin: Negative for color change and rash  Neurological: Negative for seizures and syncope  All other systems reviewed and are negative        Objective:  Vitals:    23 1339   BP: 122/70   Pulse: 93   Resp: 18   Temp:    SpO2:        Physical Exam  Constitutional:       Appearance: Normal appearance  Genitourinary:      Vulva normal    Cardiovascular:      Rate and Rhythm: Normal rate  Pulses: Normal pulses  Pulmonary:      Effort: Pulmonary effort is normal  No respiratory distress  Abdominal:      Palpations: Abdomen is soft  Tenderness: There is no abdominal tenderness  Neurological:      Mental Status: She is alert  Skin:     General: Skin is warm and dry  Psychiatric:         Mood and Affect: Mood normal          Behavior: Behavior normal    Vitals reviewed  SVE: 0 / 0% / -5  SSE: Closed cervix with minimal vaginal discharge  FHT: 150s bpm  Nada: no contractions    Wet mount/KOH: Negative for clue cells, trichomonas, and hyphae    Rupture workup: Nitrazine negative, no Ferning, no Pooling    TVUS:    Cervical length: 3 30cm   Vertex   no funneling, no dynamic changes      Damon Castillo MD 3/19/2023 2:01 PM

## 2023-03-24 ENCOUNTER — ROUTINE PRENATAL (OUTPATIENT)
Dept: OBGYN CLINIC | Facility: CLINIC | Age: 23
End: 2023-03-24

## 2023-03-24 VITALS — WEIGHT: 150 LBS | BODY MASS INDEX: 23.49 KG/M2 | DIASTOLIC BLOOD PRESSURE: 70 MMHG | SYSTOLIC BLOOD PRESSURE: 106 MMHG

## 2023-03-24 DIAGNOSIS — Z34.93 PRENATAL CARE IN THIRD TRIMESTER: Primary | ICD-10-CM

## 2023-03-24 DIAGNOSIS — Z3A.24 24 WEEKS GESTATION OF PREGNANCY: ICD-10-CM

## 2023-03-24 NOTE — PROGRESS NOTES
Domingo Martines is a 59-year-old G1, P0 at 24 weeks and 3 days presenting for routine prenatal care-denies any contractions, loss of fluid or vaginal bleeding  Endorses regular fetal movement  Urine negative/negative  Nausea and vomiting has improved but does report some morning nausea and vomiting with certain foods  Level 2 ultrasound was within normal limits has follow-up growth recommended at 32 weeks  Third trimester lab slip given and reviewed-return to office in 4 weeks or sooner if needed

## 2023-03-24 NOTE — PATIENT INSTRUCTIONS
Pregnancy at 23 to 26 Weeks   AMBULATORY CARE:   What changes are happening to your body: You are now close to or at the beginning of the third trimester  The third trimester starts at 24 weeks and ends with delivery  As your baby gets larger, you may develop certain symptoms  These may include pain in your back or down the sides of your abdomen  You may also have stretch marks on your abdomen, breasts, thighs, or buttocks  You may also have constipation  Seek care immediately if:   You develop a severe headache that does not go away  You have new or increased vision changes, such as blurred or spotted vision  You have new or increased swelling in your face or hands  You have vaginal spotting or bleeding  Your water broke or you feel warm water gushing or trickling from your vagina  Call your doctor or obstetrician if:   You have abdominal cramps, pressure, or tightening  You have a change in vaginal discharge  You have light bleeding  You have chills or a fever  You have vaginal itching, burning, or pain  You have yellow, green, white, or foul-smelling vaginal discharge  You have pain or burning when you urinate, less urine than usual, or pink or bloody urine  You have questions or concerns about your condition or care  How to care for yourself at this stage of your pregnancy:       Eat a variety of healthy foods  Healthy foods include fruits, vegetables, whole-grain breads, low-fat dairy foods, beans, lean meats, and fish  Drink liquids as directed  Ask how much liquid to drink each day and which liquids are best for you  Limit caffeine to less than 200 milligrams each day  Limit your intake of fish to 2 servings each week  Choose fish low in mercury such as canned light tuna, shrimp, salmon, cod, or tilapia  Do not  eat fish high in mercury such as swordfish, tilefish, otilia mackerel, and shark  Manage back pain    Do not stand for long periods of time or lift heavy items  Use good posture while you stand, squat, or bend  Wear low-heeled shoes with good support  Rest may also help to relieve back pain  Ask your healthcare provider about exercises you can do to strengthen your back muscles  Take prenatal vitamins as directed  Your need for certain vitamins and minerals, such as folic acid, increases during pregnancy  Prenatal vitamins provide some of the extra vitamins and minerals you need  Prenatal vitamins may also help to decrease the risk of certain birth defects  Talk to your healthcare provider about exercise  Moderate exercise can help you stay fit  Your healthcare provider will help you plan an exercise program that is safe for you during pregnancy  Do not smoke  Smoking increases your risk of a miscarriage and other health problems during your pregnancy  Smoking can cause your baby to be born too early or weigh less at birth  Ask your healthcare provider for information if you need help quitting  Do not drink alcohol  Alcohol passes from your body to your baby through the placenta  It can affect your baby's brain development and cause fetal alcohol syndrome (FAS)  FAS is a group of conditions that causes mental, behavior, and growth problems  Talk to your healthcare provider before you take any medicines  Many medicines may harm your baby if you take them when you are pregnant  Do not take any medicines, vitamins, herbs, or supplements without first talking to your healthcare provider  Never use illegal or street drugs (such as marijuana or cocaine) while you are pregnant  Safety tips during pregnancy:   Avoid hot tubs and saunas  Do not use a hot tub or sauna while you are pregnant, especially during your first trimester  Hot tubs and saunas may raise your baby's temperature and increase the risk of birth defects  Avoid toxoplasmosis  This is an infection caused by eating raw meat or being around infected cat feces   It can cause birth defects, miscarriages, and other problems  Wash your hands after you touch raw meat  Make sure any meat is well-cooked before you eat it  Avoid raw eggs and unpasteurized milk  Use gloves or ask someone else to clean your cat's litter box while you are pregnant  Changes that are happening with your baby:  By 26 weeks, your baby will weigh about 2 pounds  Your baby will be about 10 inches long from the top of the head to the rump (baby's bottom)  Your baby's movements are much stronger now  Your baby's eyes are almost completely formed and can partially open  Your baby also sleeps and wakes up  What you need to know about prenatal care: Your healthcare provider will check your blood pressure and weight  You may also need the following:  A urine test  may also be done to check for sugar and protein  These can be signs of gestational diabetes or infection  Protein in your urine may also be a sign of preeclampsia  Preeclampsia is a condition that can develop during week 20 or later of your pregnancy  It causes high blood pressure, and it can cause problems with your kidneys and other organs  A gestational diabetes screen  may be done  Your healthcare provider may order either a 1-step or 2-step oral glucose tolerance test (OGTT)  1-step OGTT:  Your blood sugar level will be tested after you have not eaten for 8 hours (fasting)  You will then be given a glucose drink  Your level will be tested again 1 hour and 2 hours after you finish the drink  2-step OGTT:  You do not have to fast for the first part of the test  You will have the glucose drink at any time of day  Your blood sugar level will be checked 1 hour later  If your blood sugar is higher than a certain level, another test will be ordered  You will fast and your blood sugar level will be tested  You will have the glucose drink  Your blood will be tested again 1 hour, 2 hours, and 3 hours after you finish the glucose drink      Fundal height is a measurement of your uterus to check your baby's growth  This number is usually the same as the number of weeks that you have been pregnant  Your baby's heart rate  will be checked  Follow up with your doctor or obstetrician as directed:  Write down your questions so you remember to ask them during your visits  © [employer] 2022 Information is for End User's use only and may not be sold, redistributed or otherwise used for commercial purposes  The above information is an  only  It is not intended as medical advice for individual conditions or treatments  Talk to your doctor, nurse or pharmacist before following any medical regimen to see if it is safe and effective for you

## 2023-04-21 PROBLEM — Z3A.28 28 WEEKS GESTATION OF PREGNANCY: Status: ACTIVE | Noted: 2022-12-30

## 2023-05-09 ENCOUNTER — ROUTINE PRENATAL (OUTPATIENT)
Dept: OBGYN CLINIC | Facility: CLINIC | Age: 23
End: 2023-05-09

## 2023-05-09 VITALS — DIASTOLIC BLOOD PRESSURE: 74 MMHG | WEIGHT: 161.6 LBS | BODY MASS INDEX: 25.31 KG/M2 | SYSTOLIC BLOOD PRESSURE: 106 MMHG

## 2023-05-09 DIAGNOSIS — Z34.03 ENCOUNTER FOR SUPERVISION OF NORMAL FIRST PREGNANCY IN THIRD TRIMESTER: Primary | ICD-10-CM

## 2023-05-09 NOTE — PROGRESS NOTES
Damaris Sweeney is a 20 y o  Aury Kei at 31w0d  Reports +FM  She is doing well  Denies LOF/Bleeding + Cramping  Denies V/Ha  C/O nausea and mild edema  She smokes marijuana twice a day  Denies tobacco,  ETOH use  Denies DV  Growth scan scheduled for 5/19/23  Visit Vitals  /74   Wt 73 3 kg (161 lb 9 6 oz)   LMP 10/04/2022   BMI 25 31 kg/m²   OB Status Pregnant   Smoking Status Never   BSA 1 85 m²     Was not able to provide urine  Konstantin Conde was seen today for routine prenatal visit  Diagnoses and all orders for this visit:    Encounter for supervision of normal first pregnancy in third trimester      Patient encouraged to discontinue marijuana  Educated on risk of fetal growth restriction  3rd trimester folder given today and reviewed  [address] reviewed with patient - 10 movements in 2 hrs  RTO in 2 weeks for PNV or sooner if needed

## 2023-05-17 ENCOUNTER — TELEPHONE (OUTPATIENT)
Dept: OBGYN CLINIC | Facility: CLINIC | Age: 23
End: 2023-05-17

## 2023-05-18 PROBLEM — Z3A.32 32 WEEKS GESTATION OF PREGNANCY: Status: ACTIVE | Noted: 2022-12-30

## 2023-05-19 ENCOUNTER — ULTRASOUND (OUTPATIENT)
Facility: HOSPITAL | Age: 23
End: 2023-05-19

## 2023-05-19 VITALS
DIASTOLIC BLOOD PRESSURE: 76 MMHG | SYSTOLIC BLOOD PRESSURE: 112 MMHG | BODY MASS INDEX: 25.19 KG/M2 | HEART RATE: 93 BPM | WEIGHT: 160.5 LBS | HEIGHT: 67 IN

## 2023-05-19 DIAGNOSIS — Z36.89 ENCOUNTER FOR ULTRASOUND TO CHECK FETAL GROWTH: Primary | ICD-10-CM

## 2023-05-19 DIAGNOSIS — Z3A.32 32 WEEKS GESTATION OF PREGNANCY: ICD-10-CM

## 2023-05-19 DIAGNOSIS — Z36.2 ENCOUNTER FOR FOLLOW-UP ULTRASOUND OF FETAL ANATOMY: ICD-10-CM

## 2023-05-19 NOTE — PROGRESS NOTES
"[address]: Ms Geni Salinas was seen today for fetal growth and followup missed anatomy ultrasound  See ultrasound report under \"OB Procedures\" tab  The time spent on this established patient on the encounter date included 5 minutes previsit service time reviewing records and precharting, 5 minutes face-to-face service time counseling regarding results and coordinating care, and  5 minutes charting, totalling 15 minutes  Please don't hesitate to contact our office with any concerns or questions    -Ashvin Vallejo MD      "

## 2023-05-26 ENCOUNTER — ROUTINE PRENATAL (OUTPATIENT)
Dept: OBGYN CLINIC | Facility: CLINIC | Age: 23
End: 2023-05-26

## 2023-05-26 VITALS — WEIGHT: 163.4 LBS | BODY MASS INDEX: 25.59 KG/M2 | DIASTOLIC BLOOD PRESSURE: 72 MMHG | SYSTOLIC BLOOD PRESSURE: 110 MMHG

## 2023-05-26 DIAGNOSIS — Z34.03 ENCOUNTER FOR SUPERVISION OF NORMAL FIRST PREGNANCY IN THIRD TRIMESTER: Primary | ICD-10-CM

## 2023-05-26 NOTE — PROGRESS NOTES
Jesus Kaminski is a 20 y o   at 33w3d  Reports +FM  She is doing well  Denies LOF/Bleeding  + Cramping  Denies n/v/Ha, edema  Denies tobacco or ETOH use  Denies DV  She is still smoking marijuana  Fetal growth can on 23 - EFW 4 lbs 4 oz (34%)  Fetal abdominal circumference is at the 10th percentile which raises concern for evolving fetal growth restriction  Follow-up in 3-4 weeks for interval growth  Scheduled for 23  Visit Vitals  /72   Wt 74 1 kg (163 lb 6 4 oz)   LMP 10/04/2022   BMI 25 59 kg/m²   OB Status Pregnant   Smoking Status Never   BSA 1 86 m²       Helena was seen today for routine prenatal visit  Diagnoses and all orders for this visit:    Encounter for supervision of normal first pregnancy in third trimester      Continue FKC  Limit use of marijuana    RTO in 2 weeks for PNV or sooner if needed

## 2023-06-11 ENCOUNTER — NURSE TRIAGE (OUTPATIENT)
Dept: OTHER | Facility: OTHER | Age: 23
End: 2023-06-11

## 2023-06-12 ENCOUNTER — HOSPITAL ENCOUNTER (OUTPATIENT)
Facility: HOSPITAL | Age: 23
Discharge: HOME | End: 2023-06-12
Attending: OBSTETRICS & GYNECOLOGY | Admitting: STUDENT IN AN ORGANIZED HEALTH CARE EDUCATION/TRAINING PROGRAM
Payer: COMMERCIAL

## 2023-06-12 VITALS
DIASTOLIC BLOOD PRESSURE: 77 MMHG | SYSTOLIC BLOOD PRESSURE: 114 MMHG | RESPIRATION RATE: 18 BRPM | HEART RATE: 87 BPM | OXYGEN SATURATION: 99 % | TEMPERATURE: 98.1 F

## 2023-06-12 PROCEDURE — 99213 OFFICE O/P EST LOW 20 MIN: CPT

## 2023-06-12 PROCEDURE — NC001 PR NO CHARGE: Performed by: STUDENT IN AN ORGANIZED HEALTH CARE EDUCATION/TRAINING PROGRAM

## 2023-06-12 NOTE — TELEPHONE ENCOUNTER
"  Reason for Disposition  • [1] Contractions > 10 minutes apart AND [2] persist > 24 hours AND [3] no improvement using CARE ADVICE    Answer Assessment - Initial Assessment Questions  1  ONSET: \"When did the symptoms begin? \"         3 hours ago   2  CONTRACTIONS: \"Describe the contractions that you are having  \" (e g , duration, frequency, regularity, severity)        ctxs strong  at 3 weeks     3  MORENA: \"What date are you expecting to deliver? \"         3 11 2023     4  PARITY: \"Have you had a baby before? \" If Yes, ask: \"How long did the labor last?\"      primip       5  FETAL MOVEMENT: \"Has the baby's movement decreased or changed significantly from normal?\"        Yes   6  OTHER SYMPTOMS: \"Do you have any other symptoms? \" (e g , leaking fluid from vagina, fever, hand/facial swelling)         ctxs strong    Protocols used: PREGNANCY - LABOR - -ADULT-AH    "

## 2023-06-12 NOTE — PROGRESS NOTES
L&D Triage Note - OB/GYN  Ministerio Jalloh 20 y o  female MRN: [id number]  Unit/Bed#: LD TRIAGE 3 Encounter: [id number]      ASSESSMENT:    Ministerio Jalloh is a 20 y o  Antoine Pemberton at 35w6d presenting for  contractions    PLAN:    1) r/o labor  SVE: 0/0/-4  Arial: 2 contractions noted on monitor over 40 minutes  Fetal heart tracing reactive  Discussed rest and adequate hydration at home  Reviewed labor precautions with patient  Encouraged her to contact the office if she begins to feel more symptoms  Discharge Instructions:   Continue routine prenatal care  Discharge from [facility] triage with term labor precautions    - Reviewed rupture of membranes, false vs true labor, decreased fetal movement, and vaginal bleeding   - Pt to call provider with any concerns and follow up at her next scheduled prenatal appointment on 2023 at 1330 hrs  - Case discussed with Dr Keyur Jurado:    Ministerio Jalloh 20 y o  Antoine Pemberton at 35w6d with an Estimated Date of Delivery: 23     Presents to triage for evaluation of  contractions  She states that she began feeling contractions around 2030 hrs and notes that they were 15 minutes apart  She denies any leakage of fluid, vaginal bleeding, or decreased fetal movement  On evaluation in triage, she reports that her contractions are still about 15 minutes apart  She was not timing how long the contractions were lasting just noted when she thought she felt them  Denies nausea, vomiting, diarrhea, constipation, dysuria or any other complaints at this time    Her current obstetrical history is significant for previous nausea/vomiting in pregnancy      OBJECTIVE:    Vitals:    23 0030   BP: 114/77   Pulse: 87   Resp: 18   Temp: 98 1 °F (36 7 °C)   SpO2: 99%       ROS:  Constitutional: Negative  Respiratory: Negative  Cardiovascular: Negative    Gastrointestinal: Negative    General Physical Exam:    General: Well appearing, no distress  HEENT: Mucous membranes appear dry  Respiratory: Unlabored breathing  Cardiovascular: Regular rate  Abdomen: Soft, gravid, nontender  Fundal Height: Appropriate for gestational age  Extremities: Warm and well perfused  Non tender      Cervical Exam: Chaperone present  SVE: Cervical Dilation: Closed  Cervical Effacement: 0  Cervical Consistency: Firm  Fetal Station: Floating  Presentation: Vertex  Position: Unknown  Method: Manual  OB Examiner: Maddi Madrid    Fetal monitoring:    FHT:  Baseline Rate: 135 bpm  Variability: Moderate 6-25 bpm  Accelerations: 15 x 15 or greater  No decelerations     TOCO: over 40 minutes of monitoring  Contraction Frequency (minutes): x2  Contraction Duration (seconds): 0  Contraction Quality: Not applicable    Chris Bailon DO, OBGYN PGY-1  6/12/2023 12:49 AM

## 2023-06-12 NOTE — TELEPHONE ENCOUNTER
Regardin weeks pregnant with contractions  ----- Message from Johnie Klinefelter sent at 2023 10:54 PM EDT -----  My daughter is 35 weeks pregnant and in labor  She is having contractions for about an hour and they are 15-20 minutes apart lasting about 5 minutes each

## 2023-06-12 NOTE — TELEPHONE ENCOUNTER
Patient 35 weeks with strong ctx's  Sent to [facility]  TT sent to on call and charge RN

## 2023-06-13 ENCOUNTER — TELEPHONE (OUTPATIENT)
Facility: HOSPITAL | Age: 23
End: 2023-06-13

## 2023-06-13 NOTE — TELEPHONE ENCOUNTER
Called [facility] to arrange transportation for patient's appointment  scheduled on 6/19/23 at 11:30 in [place]  Spoke with patient and explained [facility] will pick-up patient at 10:15 for this Maternal Fetal Medicine appointment  Patient verbalized understanding

## 2023-06-18 NOTE — PROGRESS NOTES
Please refer to the [facility] ultrasound report in Ob Procedures for additional information regarding today's visit

## 2023-06-19 ENCOUNTER — ULTRASOUND (OUTPATIENT)
Dept: PERINATAL CARE | Facility: OTHER | Age: 23
End: 2023-06-19
Payer: COMMERCIAL

## 2023-06-19 VITALS
DIASTOLIC BLOOD PRESSURE: 68 MMHG | SYSTOLIC BLOOD PRESSURE: 122 MMHG | BODY MASS INDEX: 27.18 KG/M2 | HEIGHT: 67 IN | WEIGHT: 173.2 LBS | HEART RATE: 94 BPM

## 2023-06-19 DIAGNOSIS — Z3A.36 36 WEEKS GESTATION OF PREGNANCY: ICD-10-CM

## 2023-06-19 DIAGNOSIS — O36.5930 INTRAUTERINE GROWTH RESTRICTION AFFECTING ANTEPARTUM CARE OF MOTHER IN THIRD TRIMESTER, SINGLE OR UNSPECIFIED FETUS: Primary | ICD-10-CM

## 2023-06-19 PROCEDURE — 59025 FETAL NON-STRESS TEST: CPT | Performed by: OBSTETRICS & GYNECOLOGY

## 2023-06-19 PROCEDURE — 76816 OB US FOLLOW-UP PER FETUS: CPT | Performed by: OBSTETRICS & GYNECOLOGY

## 2023-06-19 PROCEDURE — 76820 UMBILICAL ARTERY ECHO: CPT | Performed by: OBSTETRICS & GYNECOLOGY

## 2023-06-19 PROCEDURE — 99214 OFFICE O/P EST MOD 30 MIN: CPT | Performed by: OBSTETRICS & GYNECOLOGY

## 2023-06-19 NOTE — PATIENT INSTRUCTIONS
Kick Counts in Pregnancy   WHAT YOU NEED TO KNOW:   Kick counts measure how much your baby is moving in your womb  A kick from your baby can be felt as a twist, turn, swish, roll, or jab  It is common to feel your baby kicking at 26 to 28 weeks of pregnancy  You may feel your baby kick as early as 20 weeks of pregnancy  You may want to start counting at 28 weeks  DISCHARGE INSTRUCTIONS:   Contact your doctor immediately if:   You feel a change in the number of kicks or movements of your baby  You feel fewer than 10 kicks within 2 hours  You have questions or concerns about your baby's movements  Why measure kick counts:  Your baby's movement may provide information about your baby's health  He or she may move less, or not at all, if there are problems  Your baby may move less if he or she is not getting enough oxygen or nutrition from the placenta  Do not smoke while you are pregnant  Smoking decreases the amount of oxygen that gets to your baby  Talk to your healthcare provider if you need help to quit smoking  Tell your healthcare provider as soon as you feel a change in your baby's movements  When to measure kick counts:   Measure kick counts at the same time every day  Measure kick counts when your baby is awake and most active  Your baby may be most active in the evening  How to measure kick counts:  Check that your baby is awake before you measure kick counts  You can wake up your baby by lightly pushing on your belly, walking, or drinking something cold  Your healthcare provider may tell you different ways to measure kick counts  You may be told to do the following:  Use a chart or clock to keep track of the time you start and finish counting  Sit in a chair or lie on your left side  Place your hands on the largest part of your belly  Count until you reach 10 kicks  Write down how much time it takes to count 10 kicks  It may take 30 minutes to 2 hours to count 10 kicks  It should not take more than 2 hours to count 10 kicks  Follow up with your doctor as directed:  Write down your questions so you remember to ask them during your visits  © Copyright Nahed Padilla 2022 Information is for End User's use only and may not be sold, redistributed or otherwise used for commercial purposes  The above information is an  only  It is not intended as medical advice for individual conditions or treatments  Talk to your doctor, nurse or pharmacist before following any medical regimen to see if it is safe and effective for you

## 2023-06-19 NOTE — PROGRESS NOTES
Non-Stress Testing:    Non-Stress test, equipment, procedure, and expected outcomes reviewed  Reviewed fetal kick counts and when to call OB  Chula Serrano Verified patient understanding of fetal kick counts with teach back method  Patient reports feeling daily fetal movements  Patient has no questions or concerns  NST reviewed by dr Bennett Farrell

## 2023-06-19 NOTE — LETTER
June 19, 2023     Yuri Crowder, [address] [facility] [address]    Patient: Rasta Gandhi   YOB: 2000   Date of Visit: 6/19/2023       Dear Dr Collin Moreno: Thank you for referring Saida Lew to me for evaluation  Below are my notes for this consultation  If you have questions, please do not hesitate to call me  I look forward to following your patient along with you           Sincerely,        Makenna Sampson MD        CC: No Recipients    Makenna Sampson MD  6/18/2023  2:15 PM  Sign when Signing Visit  Please refer to the [facility] ultrasound report in Ob Procedures for additional information regarding today's visit

## 2023-06-22 ENCOUNTER — TELEPHONE (OUTPATIENT)
Facility: HOSPITAL | Age: 23
End: 2023-06-22

## 2023-06-22 NOTE — TELEPHONE ENCOUNTER
Scheduled [facility] for PT's 6/27 appt at [place] office  [facility]ABITA p/u is at 8 am  I called [facility]ABITA 6/22 to set this up

## 2023-06-27 ENCOUNTER — ULTRASOUND (OUTPATIENT)
Facility: HOSPITAL | Age: 23
End: 2023-06-27
Payer: COMMERCIAL

## 2023-06-27 ENCOUNTER — TELEPHONE (OUTPATIENT)
Facility: HOSPITAL | Age: 23
End: 2023-06-27

## 2023-06-27 ENCOUNTER — TELEPHONE (OUTPATIENT)
Dept: OBGYN CLINIC | Facility: CLINIC | Age: 23
End: 2023-06-27

## 2023-06-27 VITALS
DIASTOLIC BLOOD PRESSURE: 70 MMHG | HEIGHT: 67 IN | WEIGHT: 176.8 LBS | BODY MASS INDEX: 27.75 KG/M2 | HEART RATE: 98 BPM | SYSTOLIC BLOOD PRESSURE: 122 MMHG

## 2023-06-27 DIAGNOSIS — Z3A.38 38 WEEKS GESTATION OF PREGNANCY: ICD-10-CM

## 2023-06-27 DIAGNOSIS — O36.5990 FETAL GROWTH RESTRICTION ANTEPARTUM: Primary | ICD-10-CM

## 2023-06-27 PROCEDURE — 59025 FETAL NON-STRESS TEST: CPT | Performed by: OBSTETRICS & GYNECOLOGY

## 2023-06-27 PROCEDURE — 76815 OB US LIMITED FETUS(S): CPT | Performed by: OBSTETRICS & GYNECOLOGY

## 2023-06-27 PROCEDURE — 76820 UMBILICAL ARTERY ECHO: CPT | Performed by: OBSTETRICS & GYNECOLOGY

## 2023-06-27 NOTE — PROGRESS NOTES
Repeat Non-Stress Testing:    Patient verbalizes +FM  Pt denies ALL:               Leaking of fluid   Contractions   Vaginal bleeding   Decreased fetal movement    Patient is performing daily kick counts  Patient has no questions or concerns  NST strip reviewed by Dr Parris George

## 2023-06-27 NOTE — TELEPHONE ENCOUNTER
Pt's mother called on behalf of pt, states they are at [facility] for appt and recommended delivery between 38-39 wks  Aware will review scheduling and will call back with details, pt's mother requesting call back on her number listed in chart as pt's cell phone number is turned off

## 2023-06-27 NOTE — PROGRESS NOTES
"[address] Gerald Méndez was seen today at [id number] for umbilical artery Doppler study, KIRAN, and NST  See ultrasound report under \"OB Procedures\" tab    Please don't hesitate to contact our office with any concerns or questions   -Georgiana Angelucci, MD      "

## 2023-06-27 NOTE — LETTER
"June 27, 2023     Dave Samaniego, [address]  [address] [place] [address]    Patient: Eugene Sanchez   YOB: 2000   Date of Visit: 6/27/2023       Dear Gus Fry:    Eugene Sanchez needs a prenatal visit scheduled asap, and 38-39 week induction for fetal growth restriction  I advised her to call your office today as well to schedule this  Thank you  Sincerely,        Carlos Terry MD        CC: No Recipients    Carlos Terry MD  6/27/2023  9:36 AM  Sign when Signing Visit  [address]: Eugene Sanchez was seen today at [id number] for umbilical artery Doppler study, KIRAN, and NST  See ultrasound report under \"OB Procedures\" tab    Please don't hesitate to contact our office with any concerns or questions   -Carlos Terry MD           "

## 2023-06-28 ENCOUNTER — ROUTINE PRENATAL (OUTPATIENT)
Dept: OBGYN CLINIC | Facility: CLINIC | Age: 23
End: 2023-06-28

## 2023-06-28 VITALS — SYSTOLIC BLOOD PRESSURE: 138 MMHG | BODY MASS INDEX: 27.28 KG/M2 | WEIGHT: 174.2 LBS | DIASTOLIC BLOOD PRESSURE: 82 MMHG

## 2023-06-28 DIAGNOSIS — Z34.03 ENCOUNTER FOR SUPERVISION OF NORMAL FIRST PREGNANCY IN THIRD TRIMESTER: Primary | ICD-10-CM

## 2023-06-28 PROCEDURE — PNV: Performed by: NURSE PRACTITIONER

## 2023-06-28 NOTE — TELEPHONE ENCOUNTER
Called and LMOM on moms cell phone  IOL scheduled on July 1st at [address] updated, calender updated, provider aware and patient aware via voicemail  Told patient to call back to confirm if this works for her or not

## 2023-06-28 NOTE — PROGRESS NOTES
Kevin Aguilar is a 20 y o  [id number] at 73Y3R complicated by FGR  Reports +FM  She is doing well  Denies LOF/Bleeding  +Cramping  Denies n/v/Ha  Mild LE edema  Denies tobacco or ETOH use  Denies DV  Visit Vitals  /82   Wt 79 kg (174 lb 3 2 oz)   LMP 10/04/2022   BMI 27 28 kg/m²   OB Status Pregnant   Smoking Status Never   BSA 1 91 m²       Urine not provided today    SVE fingertip/50/-4    Helena was seen today for routine prenatal visit  Diagnoses and all orders for this visit:    Encounter for supervision of normal first pregnancy in third trimester      GBS culture collected today  Per [facility] she is to be delivered between 38-39 weeks  Staff is aware and they will call her with date and time for IOL  Continue FKC  Watch for signs of labor  RTO in 1 week for PNV or sooner if needed

## 2023-06-30 LAB — GP B STREP DNA SPEC QL NAA+PROBE: NEGATIVE

## 2023-07-01 ENCOUNTER — HOSPITAL ENCOUNTER (INPATIENT)
Facility: HOSPITAL | Age: 23
LOS: 2 days | Discharge: HOME | End: 2023-07-03
Attending: STUDENT IN AN ORGANIZED HEALTH CARE EDUCATION/TRAINING PROGRAM | Admitting: STUDENT IN AN ORGANIZED HEALTH CARE EDUCATION/TRAINING PROGRAM
Payer: COMMERCIAL

## 2023-07-01 ENCOUNTER — APPOINTMENT (OUTPATIENT)
Dept: LABOR AND DELIVERY | Facility: HOSPITAL | Age: 23
End: 2023-07-01
Payer: COMMERCIAL

## 2023-07-01 DIAGNOSIS — Z3A.38 38 WEEKS GESTATION OF PREGNANCY: ICD-10-CM

## 2023-07-01 LAB
ABO GROUP BLD: NORMAL
BLD GP AB SCN SERPL QL: NEGATIVE
ERYTHROCYTE [DISTWIDTH] IN BLOOD BY AUTOMATED COUNT: 14.3 % (ref 11.6–15.1)
HCT VFR BLD AUTO: 35.4 % (ref 34.8–46.1)
HGB BLD-MCNC: 11.7 G/DL (ref 11.5–15.4)
MCH RBC QN AUTO: 28.7 PG (ref 26.8–34.3)
MCHC RBC AUTO-ENTMCNC: 33.1 G/DL (ref 31.4–37.4)
MCV RBC AUTO: 87 FL (ref 82–98)
PLATELET # BLD AUTO: 308 THOUSANDS/UL (ref 149–390)
PMV BLD AUTO: 9.8 FL (ref 8.9–12.7)
RBC # BLD AUTO: 4.08 MILLION/UL (ref 3.81–5.12)
RH BLD: POSITIVE
SPECIMEN EXPIRATION DATE: NORMAL
WBC # BLD AUTO: 15.16 THOUSAND/UL (ref 4.31–10.16)

## 2023-07-01 PROCEDURE — 4A1HXCZ MONITORING OF PRODUCTS OF CONCEPTION, CARDIAC RATE, EXTERNAL APPROACH: ICD-10-PCS | Performed by: STUDENT IN AN ORGANIZED HEALTH CARE EDUCATION/TRAINING PROGRAM

## 2023-07-01 PROCEDURE — 85027 COMPLETE CBC AUTOMATED: CPT

## 2023-07-01 PROCEDURE — NC001 PR NO CHARGE: Performed by: STUDENT IN AN ORGANIZED HEALTH CARE EDUCATION/TRAINING PROGRAM

## 2023-07-01 PROCEDURE — 86900 BLOOD TYPING SEROLOGIC ABO: CPT

## 2023-07-01 PROCEDURE — 86780 TREPONEMA PALLIDUM: CPT

## 2023-07-01 PROCEDURE — 86850 RBC ANTIBODY SCREEN: CPT

## 2023-07-01 PROCEDURE — 86901 BLOOD TYPING SEROLOGIC RH(D): CPT

## 2023-07-01 RX ORDER — MISOPROSTOL 100 UG/1
25 TABLET ORAL ONCE
Status: DISCONTINUED | OUTPATIENT
Start: 2023-07-01 | End: 2023-07-02

## 2023-07-01 RX ORDER — BUPIVACAINE HYDROCHLORIDE 2.5 MG/ML
30 INJECTION, SOLUTION EPIDURAL; INFILTRATION; INTRACAUDAL ONCE AS NEEDED
Status: DISCONTINUED | OUTPATIENT
Start: 2023-07-01 | End: 2023-07-03 | Stop reason: HOSPADM

## 2023-07-01 RX ORDER — ONDANSETRON 2 MG/ML
4 INJECTION INTRAMUSCULAR; INTRAVENOUS EVERY 6 HOURS PRN
Status: DISCONTINUED | OUTPATIENT
Start: 2023-07-01 | End: 2023-07-02

## 2023-07-01 RX ORDER — SODIUM CHLORIDE, SODIUM LACTATE, POTASSIUM CHLORIDE, CALCIUM CHLORIDE 600; 310; 30; 20 MG/100ML; MG/100ML; MG/100ML; MG/100ML
125 INJECTION, SOLUTION INTRAVENOUS CONTINUOUS
Status: DISCONTINUED | OUTPATIENT
Start: 2023-07-01 | End: 2023-07-03 | Stop reason: HOSPADM

## 2023-07-02 ENCOUNTER — ANESTHESIA EVENT (INPATIENT)
Dept: ANESTHESIOLOGY | Facility: HOSPITAL | Age: 23
End: 2023-07-02
Payer: COMMERCIAL

## 2023-07-02 ENCOUNTER — ANESTHESIA (INPATIENT)
Dept: ANESTHESIOLOGY | Facility: HOSPITAL | Age: 23
End: 2023-07-02
Payer: COMMERCIAL

## 2023-07-02 PROBLEM — Z28.39 RUBELLA NON-IMMUNE STATUS, ANTEPARTUM: Status: ACTIVE | Noted: 2023-07-02

## 2023-07-02 PROBLEM — O09.899 RUBELLA NON-IMMUNE STATUS, ANTEPARTUM: Status: ACTIVE | Noted: 2023-07-02

## 2023-07-02 LAB
AMPHETAMINES SERPL QL SCN: NEGATIVE
BARBITURATES UR QL: NEGATIVE
BASE EXCESS BLDCOA CALC-SCNC: -2.6 MMOL/L (ref 3–11)
BASE EXCESS BLDCOV CALC-SCNC: -3.6 MMOL/L (ref 1–9)
BENZODIAZ UR QL: NEGATIVE
COCAINE UR QL: NEGATIVE
HCO3 BLDCOA-SCNC: 25.4 MMOL/L (ref 17.3–27.3)
HCO3 BLDCOV-SCNC: 21.6 MMOL/L (ref 12.2–28.6)
METHADONE UR QL: NEGATIVE
O2 CT VFR BLDCOA CALC: 5.1 ML/DL
OPIATES UR QL SCN: NEGATIVE
OXYCODONE+OXYMORPHONE UR QL SCN: NEGATIVE
OXYHGB MFR BLDCOA: 21.5 %
OXYHGB MFR BLDCOV: 42.6 %
PCO2 BLDCOA: 56.3 MM[HG] (ref 30–60)
PCO2 BLDCOV: 39.9 MM HG (ref 27–43)
PCP UR QL: NEGATIVE
PH BLDCOA: 7.27 [PH] (ref 7.23–7.43)
PH BLDCOV: 7.35 [PH] (ref 7.19–7.49)
PO2 BLDCOA: 14.3 MM HG (ref 5–25)
PO2 BLDCOV: 20.7 MM HG (ref 15–45)
SAO2 % BLDCOV: 9.9 ML/DL
THC UR QL: POSITIVE
TREPONEMA PALLIDUM IGG+IGM AB [PRESENCE] IN SERUM OR PLASMA BY IMMUNOASSAY: NORMAL

## 2023-07-02 PROCEDURE — 10907ZC DRAINAGE OF AMNIOTIC FLUID, THERAPEUTIC FROM PRODUCTS OF CONCEPTION, VIA NATURAL OR ARTIFICIAL OPENING: ICD-10-PCS | Performed by: STUDENT IN AN ORGANIZED HEALTH CARE EDUCATION/TRAINING PROGRAM

## 2023-07-02 PROCEDURE — 59409 OBSTETRICAL CARE: CPT | Performed by: STUDENT IN AN ORGANIZED HEALTH CARE EDUCATION/TRAINING PROGRAM

## 2023-07-02 PROCEDURE — 3E033VJ INTRODUCTION OF OTHER HORMONE INTO PERIPHERAL VEIN, PERCUTANEOUS APPROACH: ICD-10-PCS | Performed by: STUDENT IN AN ORGANIZED HEALTH CARE EDUCATION/TRAINING PROGRAM

## 2023-07-02 PROCEDURE — 82805 BLOOD GASES W/O2 SATURATION: CPT | Performed by: STUDENT IN AN ORGANIZED HEALTH CARE EDUCATION/TRAINING PROGRAM

## 2023-07-02 PROCEDURE — 0HQ9XZZ REPAIR PERINEUM SKIN, EXTERNAL APPROACH: ICD-10-PCS | Performed by: STUDENT IN AN ORGANIZED HEALTH CARE EDUCATION/TRAINING PROGRAM

## 2023-07-02 PROCEDURE — 80307 DRUG TEST PRSMV CHEM ANLYZR: CPT

## 2023-07-02 PROCEDURE — 88307 TISSUE EXAM BY PATHOLOGIST: CPT | Performed by: PATHOLOGY

## 2023-07-02 RX ORDER — DIAPER,BRIEF,INFANT-TODD,DISP
1 EACH MISCELLANEOUS DAILY PRN
Status: DISCONTINUED | OUTPATIENT
Start: 2023-07-02 | End: 2023-07-03 | Stop reason: HOSPADM

## 2023-07-02 RX ORDER — OXYTOCIN/RINGER'S LACTATE 30/500 ML
250 PLASTIC BAG, INJECTION (ML) INTRAVENOUS ONCE
Status: DISCONTINUED | OUTPATIENT
Start: 2023-07-02 | End: 2023-07-03 | Stop reason: HOSPADM

## 2023-07-02 RX ORDER — KETOROLAC TROMETHAMINE 30 MG/ML
15 INJECTION, SOLUTION INTRAMUSCULAR; INTRAVENOUS ONCE
Status: COMPLETED | OUTPATIENT
Start: 2023-07-02 | End: 2023-07-02

## 2023-07-02 RX ORDER — METOCLOPRAMIDE HYDROCHLORIDE 5 MG/ML
10 INJECTION INTRAMUSCULAR; INTRAVENOUS EVERY 6 HOURS PRN
Status: DISCONTINUED | OUTPATIENT
Start: 2023-07-02 | End: 2023-07-03 | Stop reason: HOSPADM

## 2023-07-02 RX ORDER — ONDANSETRON 2 MG/ML
4 INJECTION INTRAMUSCULAR; INTRAVENOUS EVERY 8 HOURS PRN
Status: DISCONTINUED | OUTPATIENT
Start: 2023-07-02 | End: 2023-07-03 | Stop reason: HOSPADM

## 2023-07-02 RX ORDER — KETOROLAC TROMETHAMINE 30 MG/ML
15 INJECTION, SOLUTION INTRAMUSCULAR; INTRAVENOUS ONCE
Status: DISCONTINUED | OUTPATIENT
Start: 2023-07-02 | End: 2023-07-02

## 2023-07-02 RX ORDER — NALBUPHINE HYDROCHLORIDE 10 MG/ML
5 INJECTION, SOLUTION INTRAMUSCULAR; INTRAVENOUS; SUBCUTANEOUS ONCE
Status: COMPLETED | OUTPATIENT
Start: 2023-07-02 | End: 2023-07-02

## 2023-07-02 RX ORDER — LIDOCAINE HYDROCHLORIDE AND EPINEPHRINE 15; 5 MG/ML; UG/ML
INJECTION, SOLUTION EPIDURAL AS NEEDED
Status: DISCONTINUED | OUTPATIENT
Start: 2023-07-02 | End: 2023-07-05 | Stop reason: HOSPADM

## 2023-07-02 RX ORDER — CALCIUM CARBONATE 500 MG/1
1000 TABLET, CHEWABLE ORAL 3 TIMES DAILY PRN
Status: DISCONTINUED | OUTPATIENT
Start: 2023-07-02 | End: 2023-07-03 | Stop reason: HOSPADM

## 2023-07-02 RX ORDER — IBUPROFEN 600 MG/1
600 TABLET ORAL EVERY 6 HOURS SCHEDULED
Status: DISCONTINUED | OUTPATIENT
Start: 2023-07-03 | End: 2023-07-03 | Stop reason: HOSPADM

## 2023-07-02 RX ORDER — ROPIVACAINE HYDROCHLORIDE 2 MG/ML
INJECTION, SOLUTION EPIDURAL; INFILTRATION; PERINEURAL CONTINUOUS PRN
Status: DISCONTINUED | OUTPATIENT
Start: 2023-07-02 | End: 2023-07-05 | Stop reason: HOSPADM

## 2023-07-02 RX ORDER — DOCUSATE SODIUM 100 MG/1
100 CAPSULE, LIQUID FILLED ORAL 2 TIMES DAILY
Status: DISCONTINUED | OUTPATIENT
Start: 2023-07-02 | End: 2023-07-03 | Stop reason: HOSPADM

## 2023-07-02 RX ORDER — TERBUTALINE SULFATE 1 MG/ML
INJECTION, SOLUTION SUBCUTANEOUS
Status: DISPENSED
Start: 2023-07-02 | End: 2023-07-03

## 2023-07-02 RX ORDER — ACETAMINOPHEN 325 MG/1
650 TABLET ORAL EVERY 6 HOURS PRN
Status: DISCONTINUED | OUTPATIENT
Start: 2023-07-02 | End: 2023-07-03 | Stop reason: HOSPADM

## 2023-07-02 RX ORDER — OXYTOCIN/RINGER'S LACTATE 30/500 ML
1-30 PLASTIC BAG, INJECTION (ML) INTRAVENOUS
Status: DISCONTINUED | OUTPATIENT
Start: 2023-07-02 | End: 2023-07-02

## 2023-07-02 RX ORDER — IBUPROFEN 600 MG/1
600 TABLET ORAL EVERY 6 HOURS SCHEDULED
Status: DISCONTINUED | OUTPATIENT
Start: 2023-07-03 | End: 2023-07-02

## 2023-07-02 RX ORDER — ACETAMINOPHEN 325 MG/1
650 TABLET ORAL EVERY 4 HOURS PRN
Status: DISCONTINUED | OUTPATIENT
Start: 2023-07-02 | End: 2023-07-02

## 2023-07-02 RX ORDER — IBUPROFEN 600 MG/1
600 TABLET ORAL EVERY 6 HOURS
Status: DISCONTINUED | OUTPATIENT
Start: 2023-07-02 | End: 2023-07-02

## 2023-07-02 RX ADMIN — LIDOCAINE HYDROCHLORIDE AND EPINEPHRINE 5 ML: 15; 5 INJECTION, SOLUTION EPIDURAL at 04:54

## 2023-07-02 RX ADMIN — SODIUM CHLORIDE, SODIUM LACTATE, POTASSIUM CHLORIDE, AND CALCIUM CHLORIDE 999 ML/HR: .6; .31; .03; .02 INJECTION, SOLUTION INTRAVENOUS at 03:45

## 2023-07-02 RX ADMIN — METOCLOPRAMIDE 10 MG: 5 INJECTION, SOLUTION INTRAMUSCULAR; INTRAVENOUS at 07:02

## 2023-07-02 RX ADMIN — ONDANSETRON 4 MG: 2 INJECTION INTRAMUSCULAR; INTRAVENOUS at 13:44

## 2023-07-02 RX ADMIN — SODIUM CHLORIDE, SODIUM LACTATE, POTASSIUM CHLORIDE, AND CALCIUM CHLORIDE 125 ML/HR: .6; .31; .03; .02 INJECTION, SOLUTION INTRAVENOUS at 05:00

## 2023-07-02 RX ADMIN — DOCUSATE SODIUM 100 MG: 100 CAPSULE, LIQUID FILLED ORAL at 17:09

## 2023-07-02 RX ADMIN — NALBUPHINE HYDROCHLORIDE 5 MG: 10 INJECTION, SOLUTION INTRAMUSCULAR; INTRAVENOUS; SUBCUTANEOUS at 03:01

## 2023-07-02 RX ADMIN — KETOROLAC TROMETHAMINE 15 MG: 30 INJECTION, SOLUTION INTRAMUSCULAR at 21:43

## 2023-07-02 RX ADMIN — ROPIVACAINE HYDROCHLORIDE: 2 INJECTION, SOLUTION EPIDURAL; INFILTRATION at 04:57

## 2023-07-02 RX ADMIN — Medication 2 MILLI-UNITS/MIN: at 08:15

## 2023-07-02 RX ADMIN — ROPIVACAINE HYDROCHLORIDE: 2 INJECTION, SOLUTION EPIDURAL; INFILTRATION at 12:31

## 2023-07-02 RX ADMIN — IBUPROFEN 600 MG: 600 TABLET, FILM COATED ORAL at 17:09

## 2023-07-02 RX ADMIN — SODIUM CHLORIDE, SODIUM LACTATE, POTASSIUM CHLORIDE, AND CALCIUM CHLORIDE 125 ML/HR: .6; .31; .03; .02 INJECTION, SOLUTION INTRAVENOUS at 08:04

## 2023-07-02 RX ADMIN — ONDANSETRON 4 MG: 2 INJECTION INTRAMUSCULAR; INTRAVENOUS at 05:16

## 2023-07-02 RX ADMIN — ROPIVACAINE HYDROCHLORIDE 10 ML/HR: 2 INJECTION, SOLUTION EPIDURAL; INFILTRATION at 04:56

## 2023-07-02 NOTE — OB LABOR/OXYTOCIN SAFETY PROGRESS
Oxytocin Safety Progress Check Note - Sis Reid 20 y.o. female MRN: [id number]    Unit/Bed#: -01 Encounter: [id number]    Dose (berry-units/min) Oxytocin: 2 berry-units/min (per dr. Enoch Garces order)  Contraction Frequency (minutes): 5-6  Contraction Quality: Moderate  Tachysystole: No   Cervical Dilation: 5        Cervical Effacement: 50  Fetal Station: -2  Baseline Rate: 130 bpm  Fetal Heart Rate: 122 BPM  FHR Category: Category I               Vital Signs:   Vitals:    07/02/23 1234   BP: 106/59   Pulse: 92   Resp:    Temp:    SpO2:        Notes/comments: Pitocin stopped at 1145 due to recurrent late decelerations, restarted at 1234. SVE deferred. FHT Cat I with baseline 120bpm, moderate variability, 15x15 accels, no decels.          Kyler Michael MD 7/2/2023 12:54 PM

## 2023-07-02 NOTE — OB LABOR/OXYTOCIN SAFETY PROGRESS
Oxytocin Safety Progress Check Note - Rey Martinez 20 y.o. female MRN: [id number]    Unit/Bed#: -01 Encounter: [id number]    Dose (berry-units/min) Oxytocin: 6 berry-units/min  Contraction Frequency (minutes): 2-4  Contraction Quality: Moderate  Tachysystole: No   Cervical Dilation: 5        Cervical Effacement: 50  Fetal Station: -2  Baseline Rate: 140 bpm  Fetal Heart Rate: 142 BPM  FHR Category: Category I               Vital Signs:   Vitals:    07/02/23 1021   BP: 113/65   Pulse: 91   Resp:    Temp:    SpO2:        Notes/comments: Pit started at 0815, SVE deferred. FHT Cat I. Continue to monitor.          Golden Varma MD 7/2/2023 10:30 AM

## 2023-07-02 NOTE — ANESTHESIA POSTPROCEDURE EVALUATION
Post-Op Assessment Note    CV Status:  Stable  Pain Score: 0    Pain management: adequate     Mental Status:  Alert and awake   Hydration Status:  Euvolemic   PONV Controlled:  Controlled   Airway Patency:  Patent      Post Op Vitals Reviewed: Yes      Staff: CRNA   Comments: aleyda cardozaexKash no deficits    Post-op block assessment: site cleaned, no complications and catheter intact      No notable events documented.     /74 (07/02/23 1630)    Temp      Pulse 103 (07/02/23 1630)   Resp 18 (07/02/23 1630)    SpO2

## 2023-07-02 NOTE — OB LABOR/OXYTOCIN SAFETY PROGRESS
Labor Progress Note - Jaylen Medina 20 y.o. female MRN: [id number]    Unit/Bed#:  TRIAGE 5-01 Encounter: [id number]                 Cervical Dilation: 1        Cervical Effacement: 60  Fetal Station: Ballotable  Baseline Rate: 140 bpm  Fetal Heart Rate: 145 BPM  FHR Category: Category I             Vital Signs:   Vitals:    07/01/23 2030   BP: 122/70   Pulse: 99   Resp:        Notes/comments:   1/50/-3  FB placed  Contractions visualized, holding on cytotec        Merlyn Boyle MD 7/2/2023 2:09 AM

## 2023-07-02 NOTE — DISCHARGE SUMMARY
Obstetrics Discharge Summary  Refugio Wu 20 y.o. female MRN: [id number]  Unit/Bed#: -01 Encounter: [id number]    Admission Date: 2023     Discharge Date: ***    Admitting Attending: Dr. Shanta Dawson  Delivery Attending: Dr. Marisela Womack  Discharging Attending: ***    Admitting Diagnoses:   1. Pregnancy at 38w4d  2. Fetal growth restriction  3. Rubella non-immune    Discharge Diagnoses:   Early term delivery    Procedures: spontaneous vaginal delivery    Anesthesia: epidural    Hospital course: Refugio Wu is now a 20 y.o. Oscar Allen who was initially admitted at 38w4d for induction due to fetal growth restriction. She had a dietrich balloon for cervical ripening and received an early epidural. She was induced with Pitocin and artificial amniotomy was performed. She progressed to complete cervical dilation and began pushing. On 2023 she delivered a viable male  38w5d via normal spontaneous vaginal delivery ***,with forceps assistance ***, with vacuum assistance. She sustained *** laceration during delivery *** which was adequately repaired. 's birth weight was ***lbs ***oz; Apgars were *** (1 min) and *** (5 min).  was admitted to the *** nursery. Patient tolerated the procedure well. Her post-delivery course was ***complicated by ***. Her postpartum pain was well controlled with ***oral analgesics. Maternal blood type is *** so RhoGAM was*** indicated. On day of discharge, she was ambulating and able to reasonably perform all ADLs. She was voiding and had appropriate bowel function. Pain was well controlled. She was discharged home on postpartum day #*** without complications. Patient was instructed to follow up with her OBGYN as an outpatient and was given appropriate warnings to call provider if she develops signs of infection or uncontrolled pain.     Complications: none apparent    Condition at discharge: {condition:77640}     Provisions for Follow-Up Care:  Please see after visit summary for information related to follow-up care and any pertinent home health orders. Disposition: Home***    Planned Readmission: No    Discharge Medications:   Please see AVS for a complete list of discharge medications. Discharge instructions :   Please see AVS for complete discharge instructions.     ***

## 2023-07-02 NOTE — OB LABOR/OXYTOCIN SAFETY PROGRESS
Labor Progress Note - Tonya Baldwin 20 y.o. female MRN: [id number]    Unit/Bed#: -01 Encounter: [id number]       Contraction Frequency (minutes): 2-6  Contraction Quality: Mild  Tachysystole: No   Cervical Dilation: 5  Cervical Effacement: 50  Fetal Station: -2  Baseline Rate: 125 bpm  Fetal Heart Rate: 129 BPM  FHR Category: Category I      Vital Signs:   Vitals:    07/02/23 0752   BP: 105/59   Pulse: 82   Resp:    Temp:    SpO2:        Notes/comments: Helena is comfortable with her episural. N/V improved. Cervical exam as above. Fetal heart tracing category I with inverted ctx 2-6mins on toco. Will now induce with pitocin. Dr. Lobo Screen aware.         Karina Lassiter MD 7/2/2023 8:03 AM

## 2023-07-02 NOTE — DISCHARGE SUMMARY
Discharge Summary - OB/GYN   Norris Munguia 20 y.o. female MRN: [id number]  Unit/Bed#: -01 Encounter: [id number]      Admission Date: 2023     Discharge Date: 7/3/23     Admitting Diagnosis:   1. Pregnancy at 38w5d  2. FGR  3. Rubella non-immune  4. Hyperemesis    Discharge Diagnosis:   Same, delivered    Procedures: spontaneous vaginal delivery    Delivering Attending: Suzie Gamboa MD  Discharge Attending: Dr. Priscila Witt Course:   Norris Munguia is a 20 y.o.  female at 40w9d who was admitted to L&D for induction of labor for FGR. Her labor course was notable for induction with dietrich balloon and pitocin titration. She received an epidural for pain control and amniotomy was performed for clear fluid. Pitocin was intermittently held due to Cat II tracing. She progressed to complete and started pushing. She delivered a viable male  on 23 at 1503. Weight 5lbs 7oz via spontaneous vaginal delivery. Apgars were 9 (1 min) and 9 (5 min).  was transferred to  nursery. Patient tolerated the procedure well and was transferred to recovery in stable condition. Her post-partum course was not complicated. Her post-partum pain was well controlled with oral analgesics. On day of discharge, she was ambulating and able to reasonably perform all ADLs. She was voiding and had appropriate bowel function. Pain was well controlled. She was discharged home on post-partum day #1 without complications. Patient was instructed to follow up with her OB as an outpatient and was given appropriate warnings to call provider if she develops signs of infection or uncontrolled pain. Complications: none apparent    Condition at discharge: good     Discharge instructions/Information to patient and family:   See after visit summary for information provided to patient and family.       Provisions for Follow-Up Care:  See after visit summary for information related to follow-up care and any pertinent home health orders. Disposition: Home    Planned Readmission: No    Discharge Medications: For a complete list of the patient's medications, please refer to her med rec. Case and note reviewed agree.

## 2023-07-02 NOTE — PLAN OF CARE
Problem: PAIN - ADULT  Goal: Verbalizes/displays adequate comfort level or baseline comfort level  Description: Interventions:  - Encourage patient to monitor pain and request assistance  - Assess pain using appropriate pain scale  - Administer analgesics based on type and severity of pain and evaluate response  - Implement non-pharmacological measures as appropriate and evaluate response  - Consider cultural and social influences on pain and pain management  - Notify physician/advanced practitioner if interventions unsuccessful or patient reports new pain  Outcome: Progressing     Problem: INFECTION - ADULT  Goal: Absence or prevention of progression during hospitalization  Description: INTERVENTIONS:  - Assess and monitor for signs and symptoms of infection  - Monitor lab/diagnostic results  - Monitor all insertion sites, i.e. indwelling lines, tubes, and drains  - Monitor endotracheal if appropriate and nasal secretions for changes in amount and color  - [place] appropriate cooling/warming therapies per order  - Administer medications as ordered  - Instruct and encourage patient and family to use good hand hygiene technique  - Identify and instruct in appropriate isolation precautions for identified infection/condition  Outcome: Progressing  Goal: Absence of fever/infection during neutropenic period  Description: INTERVENTIONS:  - Monitor WBC    Outcome: Progressing     Problem: SAFETY ADULT  Goal: Patient will remain free of falls  Description: INTERVENTIONS:  - Educate patient/family on patient safety including physical limitations  - Instruct patient to call for assistance with activity   - Consult OT/PT to assist with strengthening/mobility   - Keep Call bell within reach  - Keep bed low and locked with side rails adjusted as appropriate  - Keep care items and personal belongings within reach  - Initiate and maintain comfort rounds  - Make Fall Risk Sign visible to staff  - Offer Toileting every  Hours, in advance of need  - Initiate/Maintain alarm  - Obtain necessary fall risk management equipment:   - Apply yellow socks and bracelet for high fall risk patients  - Consider moving patient to room near nurses station  Outcome: Progressing  Goal: Maintain or return to baseline ADL function  Description: INTERVENTIONS:  -  Assess patient's ability to carry out ADLs; assess patient's baseline for ADL function and identify physical deficits which impact ability to perform ADLs (bathing, care of mouth/teeth, toileting, grooming, dressing, etc.)  - Assess/evaluate cause of self-care deficits   - Assess range of motion  - Assess patient's mobility; develop plan if impaired  - Assess patient's need for assistive devices and provide as appropriate  - Encourage maximum independence but intervene and supervise when necessary  - Involve family in performance of ADLs  - Assess for home care needs following discharge   - Consider OT consult to assist with ADL evaluation and planning for discharge  - Provide patient education as appropriate  Outcome: Progressing  Goal: Maintains/Returns to pre admission functional level  Description: INTERVENTIONS:  - Perform BMAT or MOVE assessment daily.   - Set and communicate daily mobility goal to care team and patient/family/caregiver. - Collaborate with rehabilitation services on mobility goals if consulted  - Perform Range of Motion  times a day. - Reposition patient every  hours.   - Dangle patient  times a day  - Stand patient  times a day  - Ambulate patient  times a day  - Out of bed to chair  times a day   - Out of bed for meals times a day  - Out of bed for toileting  - Record patient progress and toleration of activity level   Outcome: Progressing     Problem: Knowledge Deficit  Goal: Patient/family/caregiver demonstrates understanding of disease process, treatment plan, medications, and discharge instructions  Description: Complete learning assessment and assess knowledge base.  Interventions:  - Provide teaching at level of understanding  - Provide teaching via preferred learning methods  Outcome: Progressing  Goal: Verbalizes understanding of labor plan  Description: Assess patient/family/caregiver's baseline knowledge level and ability to understand information. Provide education via patient/family/caregiver's preferred learning method at appropriate level of understanding. 1. Provide teaching at level of understanding. 2. Provide teaching via preferred learning method(s). Outcome: Progressing     Problem: DISCHARGE PLANNING  Goal: Discharge to home or other facility with appropriate resources  Description: INTERVENTIONS:  - Identify barriers to discharge w/patient and caregiver  - Arrange for needed discharge resources and transportation as appropriate  - Identify discharge learning needs (meds, wound care, etc.)  - Arrange for interpretive services to assist at discharge as needed  - Refer to Case Management Department for coordinating discharge planning if the patient needs post-hospital services based on physician/advanced practitioner order or complex needs related to functional status, cognitive ability, or social support system  Outcome: Progressing     Problem: Labor & Delivery  Goal: Manages discomfort  Description: Assess and monitor for signs and symptoms of discomfort. Assess patient's pain level regularly and per hospital policy. Administer medications as ordered. Support use of nonpharmacological methods to help control pain such as distraction, imagery, relaxation, and application of heat and cold. Collaborate with interdisciplinary team and patient to determine appropriate pain management plan. 1. Include patient in decisions related to comfort. 2. Offer non-pharmacological pain management interventions. 3. Report ineffective pain management to physician. Outcome: Progressing  Goal: Patient vital signs are stable  Description: 1.  Assess vital signs - vaginal delivery.   Outcome: Progressing     Problem: BIRTH - VAGINAL/ SECTION  Goal: Fetal and maternal status remain reassuring during the birth process  Description: INTERVENTIONS:  - Monitor vital signs  - Monitor fetal heart rate  - Monitor uterine activity  - Monitor labor progression (vaginal delivery)  - DVT prophylaxis  - Antibiotic prophylaxis  Outcome: Progressing  Goal: Emotionally satisfying birthing experience for mother/fetus  Description: Interventions:  - Assess, plan, implement and evaluate the nursing care given to the patient in labor  - Advocate the philosophy that each childbirth experience is a unique experience and support the family's chosen level of involvement and control during the labor process   - Actively participate in both the patient's and family's teaching of the birth process  - Consider cultural, [place]az[place] and age-specific factors and plan care for the patient in labor  Outcome: Progressing

## 2023-07-02 NOTE — PLAN OF CARE
Problem: PAIN - ADULT  Goal: Verbalizes/displays adequate comfort level or baseline comfort level  Description: Interventions:  - Encourage patient to monitor pain and request assistance  - Assess pain using appropriate pain scale  - Administer analgesics based on type and severity of pain and evaluate response  - Implement non-pharmacological measures as appropriate and evaluate response  - Consider cultural and social influences on pain and pain management  - Notify physician/advanced practitioner if interventions unsuccessful or patient reports new pain  Outcome: Progressing     Problem: INFECTION - ADULT  Goal: Absence or prevention of progression during hospitalization  Description: INTERVENTIONS:  - Assess and monitor for signs and symptoms of infection  - Monitor lab/diagnostic results  - Monitor all insertion sites, i.e. indwelling lines, tubes, and drains  - Monitor endotracheal if appropriate and nasal secretions for changes in amount and color  - [place] appropriate cooling/warming therapies per order  - Administer medications as ordered  - Instruct and encourage patient and family to use good hand hygiene technique  - Identify and instruct in appropriate isolation precautions for identified infection/condition  Outcome: Progressing  Goal: Absence of fever/infection during neutropenic period  Description: INTERVENTIONS:  - Monitor WBC    Outcome: Progressing     Problem: SAFETY ADULT  Goal: Patient will remain free of falls  Description: INTERVENTIONS:  - Educate patient/family on patient safety including physical limitations  - Instruct patient to call for assistance with activity   - Consult OT/PT to assist with strengthening/mobility   - Keep Call bell within reach  - Keep bed low and locked with side rails adjusted as appropriate  - Keep care items and personal belongings within reach  - Initiate and maintain comfort rounds  - Make Fall Risk Sign visible to staff  - Offer Toileting every  Hours, in advance of need  - Initiate/Maintain alarm  - Obtain necessary fall risk management equipment:   - Apply yellow socks and bracelet for high fall risk patients  - Consider moving patient to room near nurses station  Outcome: Progressing  Goal: Maintain or return to baseline ADL function  Description: INTERVENTIONS:  -  Assess patient's ability to carry out ADLs; assess patient's baseline for ADL function and identify physical deficits which impact ability to perform ADLs (bathing, care of mouth/teeth, toileting, grooming, dressing, etc.)  - Assess/evaluate cause of self-care deficits   - Assess range of motion  - Assess patient's mobility; develop plan if impaired  - Assess patient's need for assistive devices and provide as appropriate  - Encourage maximum independence but intervene and supervise when necessary  - Involve family in performance of ADLs  - Assess for home care needs following discharge   - Consider OT consult to assist with ADL evaluation and planning for discharge  - Provide patient education as appropriate  Outcome: Progressing  Goal: Maintains/Returns to pre admission functional level  Description: INTERVENTIONS:  - Perform BMAT or MOVE assessment daily.   - Set and communicate daily mobility goal to care team and patient/family/caregiver. - Collaborate with rehabilitation services on mobility goals if consulted  - Perform Range of Motion  times a day. - Reposition patient every  hours.   - Dangle patient  times a day  - Stand patient  times a day  - Ambulate patient  times a day  - Out of bed to chair  times a day   - Out of bed for meals  times a day  - Out of bed for toileting  - Record patient progress and toleration of activity level   Outcome: Progressing     Problem: Knowledge Deficit  Goal: Patient/family/caregiver demonstrates understanding of disease process, treatment plan, medications, and discharge instructions  Description: Complete learning assessment and assess knowledge base.  Interventions:  - Provide teaching at level of understanding  - Provide teaching via preferred learning methods  Outcome: Progressing  Goal: Verbalizes understanding of labor plan  Description: Assess patient/family/caregiver's baseline knowledge level and ability to understand information. Provide education via patient/family/caregiver's preferred learning method at appropriate level of understanding. 1. Provide teaching at level of understanding. 2. Provide teaching via preferred learning method(s). Outcome: Progressing     Problem: DISCHARGE PLANNING  Goal: Discharge to home or other facility with appropriate resources  Description: INTERVENTIONS:  - Identify barriers to discharge w/patient and caregiver  - Arrange for needed discharge resources and transportation as appropriate  - Identify discharge learning needs (meds, wound care, etc.)  - Arrange for interpretive services to assist at discharge as needed  - Refer to Case Management Department for coordinating discharge planning if the patient needs post-hospital services based on physician/advanced practitioner order or complex needs related to functional status, cognitive ability, or social support system  Outcome: Progressing     Problem: Labor & Delivery  Goal: Manages discomfort  Description: Assess and monitor for signs and symptoms of discomfort. Assess patient's pain level regularly and per hospital policy. Administer medications as ordered. Support use of nonpharmacological methods to help control pain such as distraction, imagery, relaxation, and application of heat and cold. Collaborate with interdisciplinary team and patient to determine appropriate pain management plan. 1. Include patient in decisions related to comfort. 2. Offer non-pharmacological pain management interventions. 3. Report ineffective pain management to physician. Outcome: Progressing  Goal: Patient vital signs are stable  Description: 1.  Assess vital signs - vaginal delivery.   Outcome: Progressing     Problem: BIRTH - VAGINAL/ SECTION  Goal: Fetal and maternal status remain reassuring during the birth process  Description: INTERVENTIONS:  - Monitor vital signs  - Monitor fetal heart rate  - Monitor uterine activity  - Monitor labor progression (vaginal delivery)  - DVT prophylaxis  - Antibiotic prophylaxis  Outcome: Progressing  Goal: Emotionally satisfying birthing experience for mother/fetus  Description: Interventions:  - Assess, plan, implement and evaluate the nursing care given to the patient in labor  - Advocate the philosophy that each childbirth experience is a unique experience and support the family's chosen level of involvement and control during the labor process   - Actively participate in both the patient's and family's teaching of the birth process  - Consider cultural, [place] and age-specific factors and plan care for the patient in labor  Outcome: Progressing

## 2023-07-02 NOTE — H&P
[address]  20 y.o. female MRN: [id number]  Unit/Bed#: LD TRIAGE 5-01 Encounter: [id number]    Assessment: 20 y.o. Morris Dhillon at 38w4d admitted for induction of labor for FGR. SVE: 1/50/-4  FHT: 140  Clinical EFW: 78% LM9% ; Cephalic confirmed by TAUS    Plan:   Rubella non-immune status, antepartum  Assessment & Plan  MMR postpartum    Fetal growth restriction antepartum  Assessment & Plan  Normal UA Doppler  FGR by AC <10%ile    * 38 weeks gestation of pregnancy  Assessment & Plan  Admit to OBN   Clear liquid diet   F/u T&S, CBC, RPR   IVF LR 125cc/hr   Continuous fetal monitoring and tocometry   Analgesia at maternal request   Vertex by TAUS  Induction plan cyto/dietrich, pitocin           Discussed case and plan w/ Dr. Kingsley Porras      Chief Complaint: none    HPI: Medhat Kim is a 20 y.o. Morris Noun with an MORENA of 7/11/2023, by Last Menstrual Period at 38w4d who is being admitted for induction of labor. She denies having uterine contractions, has no LOF, and reports no VB. She states she has felt good FM. Patient Active Problem List   Diagnosis   • 38 weeks gestation of pregnancy   • Hyperemesis affecting pregnancy, antepartum   • Gastroesophageal reflux in pregnancy in first trimester   • Abdominal pain during pregnancy   • Fetal growth restriction antepartum   • Rubella non-immune status, antepartum       Baby complications/comments: none    Review of Systems   Constitutional: Negative for chills and fever. Eyes: Negative for visual disturbance. Respiratory: Negative for shortness of breath. Cardiovascular: Negative for chest pain. Gastrointestinal: Negative for abdominal pain, diarrhea, nausea and vomiting. Genitourinary: Negative for dysuria, flank pain, hematuria, vaginal bleeding and vaginal discharge. Skin: Negative for rash. Neurological: Negative for dizziness, numbness and headaches. All other systems reviewed and are negative.       OB Hx:  OB History   [facility] Para Term  AB Living   1 0 0 0 0 0   SAB IAB Ectopic Multiple Live Births   0 0 0 0 0      # Outcome Date GA Lbr Alvaro/2nd Weight Sex Delivery Anes PTL Lv   1 Current                Past Medical Hx:  Past Medical History:   Diagnosis Date   • ADHD        Past Surgical hx:  History reviewed. No pertinent surgical history. Social Hx:  Alcohol use: denies  Tobacco use: denies  Other substance use: denies    Allergies   Allergen Reactions   • Latex Rash       Medications Prior to Admission   Medication   • Prenatal MV & Min w/FA-DHA (PRENATAL GUMMIES PO)       Objective:  HR:  [99] 99  Resp:  [16] 16  BP: (122)/(70) 122/70  Body mass index is 27.25 kg/m². Physical Exam:  Physical Exam  Constitutional:       General: She is not in acute distress. Appearance: Normal appearance. HENT:      Head: Normocephalic and atraumatic. Eyes:      Extraocular Movements: Extraocular movements intact. Cardiovascular:      Rate and Rhythm: Normal rate. Pulses: Normal pulses. Pulmonary:      Effort: Pulmonary effort is normal. No respiratory distress. Abdominal:      Tenderness: There is no abdominal tenderness. There is no guarding. Comments: gravid   Musculoskeletal:         General: Normal range of motion. Cervical back: Normal range of motion. Neurological:      General: No focal deficit present. Mental Status: She is alert. Mental status is at baseline. Skin:     General: Skin is warm and dry. Psychiatric:         Mood and Affect: Mood normal.         Behavior: Behavior normal.   Vitals reviewed.             FHT:  Baseline Rate: 140 bpm  Variability: Moderate 6-25 bpm  Accelerations: 15 x 15 or greater, At variable times  Decelerations: None  FHR Category: Category I    TOCO:        Lab Results   Component Value Date    WBC 15.16 (H) 2023    HGB 11.7 2023    HCT 35.4 2023     2023     No results found for: "NA", "K", "CL", "CO2", "BUN", "CREATININE", "GLUCOSE", "AST", "ALT"  Prenatal Labs: Reviewed      Blood type: B pos  Antibody: neg  GBS: neg  HIV: NR  Rubella: non immune  Syphilis IgM/IgG: NR  HBsAg: NR  HCAb: NR  Chlamydia: neg  Gonorrhea: neg  Diabetes 1 hour screen: wnl  3 hour glucose: not indicated  Platelets: 882  Hgb: 11.7  >2 Midnights  INPATIENT     Signature/Title: Mukesh Cole MD  Date: 7/2/2023  Time: 2:31 AM

## 2023-07-02 NOTE — ANESTHESIA PROCEDURE NOTES
Epidural Block    Patient location during procedure: OB  Start time: 7/2/2023 4:53 AM  Reason for block: procedure for pain and at surgeon's request  Staffing  Performed by: Enoch Bishop MD  Authorized by: Enoch Bishop MD    Preanesthetic Checklist  Completed: patient identified, IV checked, site marked, risks and benefits discussed, surgical consent, monitors and equipment checked, pre-op evaluation and timeout performed  Epidural  Patient position: sitting  Prep: ChloraPrep  Patient monitoring: cardiac monitor and frequent blood pressure checks  Approach: midline  Location: lumbar  Injection technique: TJ saline  Needle  Needle type: Tuohy   Needle gauge: 18 G  Catheter type: side hole  Catheter size: 20 G  Catheter at skin depth: 10 cm  Catheter securement method: stabilization device  Test dose: negative  Assessment  Number of attempts: 1negative aspiration for CSF, negative aspiration for heme and no paresthesia on injection  patient tolerated the procedure well with no immediate complications

## 2023-07-02 NOTE — OB LABOR/OXYTOCIN SAFETY PROGRESS
Labor Progress Note - Junious Gaw 20 y.o. female MRN: [id number]    Unit/Bed#: -01 Encounter: [id number]    Dose (berry-units/min) Oxytocin: 0 berry-units/min  Contraction Frequency (minutes): 2-4  Contraction Quality: Moderate  Tachysystole: No   Cervical Dilation: 7  Cervical Effacement: 90  Fetal Station: 0  Baseline Rate: 130 bpm  FHR Category: Category II     Vital Signs:   Vitals:    07/02/23 1336   BP: 141/95   Pulse: 99   Resp:    Temp:    SpO2:        Notes/comments: On exam excellent cervical change as noted above- amniotomy performed for large amount of clear fluid at 1327. Following amniotomy a prolonged deceleration for 7 minutes occurred with guerrero to 70s- pitocin was discontinued and IVF bolused- Cervical exam remained the same with no cord palpable. Maternal repositioning from left to right decubitus was completed an fetal heart rate returned to baseline with moderate variability and 15x15 acceleration. Will continue to monitor with pitocin off and if contractions spread will plan to restart pitocin. Reassess in 1-2 hours or sooner if needed.      Oziel Neely MD 7/2/2023 1:45 PM

## 2023-07-02 NOTE — ASSESSMENT & PLAN NOTE
Lochia WNL   Recovering well   Appropriate bowel and bladder function   Pain well controlled   Tolerating diet   Breastfeeding: wants to breastfeeding, but not expressing milk well, will see lactation consultant.     Ambulating without issues   No lower extremity tenderness  GBS neg  Rh B+ antibody neg

## 2023-07-02 NOTE — L&D DELIVERY NOTE
DELIVERY NOTE  Roderick Buckley 20 y.o. female MRN: [id number]  Unit/Bed#: -01 Encounter: [id number]    Obstetrician:    Dr. Elias Rice MD    Assistant:   Dr. Aren Thao MD    Pre-Delivery Diagnosis:   Patient Active Problem List   Diagnosis   • 38 weeks gestation of pregnancy   • Hyperemesis affecting pregnancy, antepartum   • Gastroesophageal reflux in pregnancy in first trimester   • Abdominal pain during pregnancy   • Fetal growth restriction antepartum   • Rubella non-immune status, antepartum       Post-Delivery Diagnosis:   Same as above - Delivered  Viable male fetus  L vaginal laceration    Procedure:  Spontaneous vaginal delivery  Repair left vaginal spontaneous laceration      Anesthesia:epidural    Specimens:   Cord blood obtained   Placenta; normal appearing, central insertion, intact   Arterial and venous blood gases (below)     Gases:  Umbilical Cord Venous Blood Gas:  Results from last 7 days   Lab Units 23  1507   PH COV  7.352   PCO2 COV mm HG 39.9   HCO3 COV mmol/L 21.6   BASE EXC COV mmol/L -3.6*   O2 CT CD VB mL/dL 9.9   O2 HGB, VENOUS CORD % 03.4     Umbilical Cord Arterial Blood Gas:  Results from last 7 days   Lab Units 23  1507   PH COA  7.273   PCO2 COA  56.3   PO2 COA mm HG 14.3   HCO3 COA mmol/L 25.4   BASE EXC COA mmol/L -2.6*   O2 CONTENT CORD ART ml/dl 5.1   O2 HGB, ARTERIAL CORD % 21.5       Quantitative Blood Loss:   196 mL           Complications:    None    Brief Description of Labor Course:  Roderick Buckley is a 20 y.o.  female at 40w9d who was admitted to L&D for induction of labor for FGR. Her labor course was notable for induction with dietrich balloon and pitocin titration. She received an epidural for pain control and amniotomy was performed for clear fluid. Pitocin was intermittently held due to Cat II tracing. She progressed to complete at 1432, pushed for 9 min, and delivered a healthy  at 46.      Description of Delivery: With  the assistance of maternal expulsive forces, the fetal vertex delivered spontaneously. A nuchal cord was noted and reduced. The anterior right shoulder was delivered atraumatically with gentle downward traction. The contralateral arm was delivered with gentle upward traction. The remainder of the fetus delivered spontaneously at 46, resulting in a viable male . Upon delivery, the infant was placed on the mothers abdomen and the cord was doubly clamped and cut after 30 seconds. The  was noted to have good tone and cry spontaneously. There was no evidence of injury. The  was passed off to  staff for evaluation. Umbilical cord blood and umbilical artery and venous gases were collected and sent to the lab. An intact placenta was delivered spontaneously at 1506 using fundal massage and gentle cord traction and was noted to have a centrally-inserted 3-vessel cord. Active management of the third stage of labor was undertaken with IV pitocin at 250milliunits/min. Inspection of the perineum, vagina, labia, cervix, and urethra revealed a left vaginal laceration that was repaired. Bleeding was noted to be under control. A bimanual exam was performed.  Outcome:  Living  with APGARS 9 (1 min) and 9 (5 min).  weight: pending    Perineal Inspection/Laceration Repair  Inspection of the perineum, vagina, labia, cervix, and urethra revealed a left vaginal laceration, which was repaired in standard fashion with 3-0 Vicryl rapide. Patient was comfortable with epidural at that time. The laceration showed good tissue reapproximation and hemostasis. At the conclusion of the delivery, all needle, sponge, and instrument counts were noted to be correct. Patient tolerated the procedure well and was allowed to recover in labor and delivery room with family and  before being transferred to the post-partum floor.      Conclusion:  Mother and baby are currently recovering nicely in stable condition. Attending Supervision:   Dr. Rozina Olson MD was present for the entire procedure.     Lizette Waller MD   OB/GYN PGY-2   7/2/2023 3:20 PM

## 2023-07-02 NOTE — PROGRESS NOTES
I introduced myself to [facility] as the daytime Chief resident. Her dietrich balloon for cervical ripening has been expelled. We are deferring her cervical check at this time because she is actively vomiting. Last had Zofra 0516 so will try Reglan instead. The fetal heart tracing appears to be category I with uterine irritability on tocometer. Anticipate starting Pitocin after cervical check. Will discuss with Dr. Aria Dennis. Stephanie Mcintyre MD  PGY-III, OB/GYN  7/2/2023, 6:57 AM

## 2023-07-03 VITALS
SYSTOLIC BLOOD PRESSURE: 140 MMHG | DIASTOLIC BLOOD PRESSURE: 64 MMHG | BODY MASS INDEX: 27.31 KG/M2 | RESPIRATION RATE: 18 BRPM | OXYGEN SATURATION: 100 % | HEIGHT: 67 IN | WEIGHT: 174 LBS | HEART RATE: 86 BPM | TEMPERATURE: 97.6 F

## 2023-07-03 PROCEDURE — 99024 POSTOP FOLLOW-UP VISIT: CPT | Performed by: OBSTETRICS & GYNECOLOGY

## 2023-07-03 RX ORDER — DOCUSATE SODIUM 100 MG/1
100 CAPSULE, LIQUID FILLED ORAL 2 TIMES DAILY PRN
Refills: 0
Start: 2023-07-03

## 2023-07-03 RX ORDER — DIAPER,BRIEF,INFANT-TODD,DISP
1 EACH MISCELLANEOUS DAILY PRN
Qty: 30 G | Refills: 0
Start: 2023-07-03

## 2023-07-03 RX ORDER — ACETAMINOPHEN 325 MG/1
650 TABLET ORAL EVERY 6 HOURS PRN
Refills: 0
Start: 2023-07-03

## 2023-07-03 RX ORDER — IBUPROFEN 600 MG/1
600 TABLET ORAL EVERY 6 HOURS PRN
Qty: 30 TABLET | Refills: 0
Start: 2023-07-03

## 2023-07-03 RX ADMIN — IBUPROFEN 600 MG: 600 TABLET, FILM COATED ORAL at 04:24

## 2023-07-03 RX ADMIN — DOCUSATE SODIUM 100 MG: 100 CAPSULE, LIQUID FILLED ORAL at 09:49

## 2023-07-03 RX ADMIN — Medication 1 APPLICATION: at 00:55

## 2023-07-03 RX ADMIN — WITCH HAZEL 1 PAD: 500 SOLUTION RECTAL; TOPICAL at 00:55

## 2023-07-03 RX ADMIN — IBUPROFEN 600 MG: 600 TABLET, FILM COATED ORAL at 16:34

## 2023-07-03 RX ADMIN — IBUPROFEN 600 MG: 600 TABLET, FILM COATED ORAL at 09:49

## 2023-07-03 NOTE — PLAN OF CARE
Problem: PAIN - ADULT  Goal: Verbalizes/displays adequate comfort level or baseline comfort level  Description: Interventions:  - Encourage patient to monitor pain and request assistance  - Assess pain using appropriate pain scale  - Administer analgesics based on type and severity of pain and evaluate response  - Implement non-pharmacological measures as appropriate and evaluate response  - Consider cultural and social influences on pain and pain management  - Notify physician/advanced practitioner if interventions unsuccessful or patient reports new pain  Outcome: Progressing     Problem: INFECTION - ADULT  Goal: Absence or prevention of progression during hospitalization  Description: INTERVENTIONS:  - Assess and monitor for signs and symptoms of infection  - Monitor lab/diagnostic results  - Monitor all insertion sites, i.e. indwelling lines, tubes, and drains  - Monitor endotracheal if appropriate and nasal secretions for changes in amount and color  - [place] appropriate cooling/warming therapies per order  - Administer medications as ordered  - Instruct and encourage patient and family to use good hand hygiene technique  - Identify and instruct in appropriate isolation precautions for identified infection/condition  Outcome: Progressing  Goal: Absence of fever/infection during neutropenic period  Description: INTERVENTIONS:  - Monitor WBC    Outcome: Progressing     Problem: SAFETY ADULT  Goal: Patient will remain free of falls  Description: INTERVENTIONS:  - Educate patient/family on patient safety including physical limitations  - Instruct patient to call for assistance with activity   - Consult OT/PT to assist with strengthening/mobility   - Keep Call bell within reach  - Keep bed low and locked with side rails adjusted as appropriate  - Keep care items and personal belongings within reach  - Initiate and maintain comfort rounds  - Make Fall Risk Sign visible to staff    - Apply yellow socks and bracelet for high fall risk patients  - Consider moving patient to room near nurses station  Outcome: Progressing  Goal: Maintain or return to baseline ADL function  Description: INTERVENTIONS:  -  Assess patient's ability to carry out ADLs; assess patient's baseline for ADL function and identify physical deficits which impact ability to perform ADLs (bathing, care of mouth/teeth, toileting, grooming, dressing, etc.)  - Assess/evaluate cause of self-care deficits   - Assess range of motion  - Assess patient's mobility; develop plan if impaired  - Assess patient's need for assistive devices and provide as appropriate  - Encourage maximum independence but intervene and supervise when necessary  - Involve family in performance of ADLs  - Assess for home care needs following discharge   - Consider OT consult to assist with ADL evaluation and planning for discharge  - Provide patient education as appropriate  Outcome: Progressing  Goal: Maintains/Returns to pre admission functional level  Description: INTERVENTIONS:  - Perform BMAT or MOVE assessment daily.   - Set and communicate daily mobility goal to care team and patient/family/caregiver.    - Collaborate with rehabilitation services on mobility goals if consulted  - Out of bed for toileting  - Record patient progress and toleration of activity level   Outcome: Progressing     Problem: DISCHARGE PLANNING  Goal: Discharge to home or other facility with appropriate resources  Description: INTERVENTIONS:  - Identify barriers to discharge w/patient and caregiver  - Arrange for needed discharge resources and transportation as appropriate  - Identify discharge learning needs (meds, wound care, etc.)  - Arrange for interpretive services to assist at discharge as needed  - Refer to Case Management Department for coordinating discharge planning if the patient needs post-hospital services based on physician/advanced practitioner order or complex needs related to functional status, cognitive ability, or social support system  Outcome: Progressing     Problem: POSTPARTUM  Goal: Experiences normal postpartum course  Description: INTERVENTIONS:  - Monitor maternal vital signs  - Assess uterine involution and lochia  Outcome: Progressing  Goal: Appropriate maternal -  bonding  Description: INTERVENTIONS:  - Identify family support  - Assess for appropriate maternal/infant bonding   -Encourage maternal/infant bonding opportunities  - Referral to  or  as needed  Outcome: Progressing  Goal: Establishment of infant feeding pattern  Description: INTERVENTIONS:  - Assess breast/bottle feeding  - Refer to lactation as needed  Outcome: Progressing  Goal: Incision(s), wounds(s) or drain site(s) healing without S/S of infection  Description: INTERVENTIONS  - Assess and document dressing, incision, wound bed, drain sites and surrounding tissue  - Provide patient and family education  Outcome: Progressing

## 2023-07-03 NOTE — CASE MANAGEMENT
Case Management Progress Note    Patient name Robbi Espinosa  Location /-57 MRN [id number]  : 2000 Date 7/3/2023       LOS (days): 2  Geometric Mean LOS (GMLOS) (days):   Days to GMLOS:        OBJECTIVE:        Current admission status: Inpatient  Preferred Pharmacy:   [facility] DR ACACIA BONILLA [address] [place] [address]  Phone: [phone] Fax: [phone]    Primary Care Provider: Yvonne Gonzalez MD    Primary Insurance: [employer]IG[employer]  Secondary Insurance:     PROGRESS NOTE:      Per [facility] worker Darío Diaz met with MOB, FOB, MGM, and PGM in general space on W3 as guests were present in the room. CM offered family waiting room discussion but group declined. Baby cleared to d/c home with MOB when otherwise able.

## 2023-07-03 NOTE — PLAN OF CARE
Problem: PAIN - ADULT  Goal: Verbalizes/displays adequate comfort level or baseline comfort level  Description: Interventions:  - Encourage patient to monitor pain and request assistance  - Assess pain using appropriate pain scale  - Administer analgesics based on type and severity of pain and evaluate response  - Implement non-pharmacological measures as appropriate and evaluate response  - Consider cultural and social influences on pain and pain management  - Notify physician/advanced practitioner if interventions unsuccessful or patient reports new pain  Outcome: Progressing     Problem: INFECTION - ADULT  Goal: Absence or prevention of progression during hospitalization  Description: INTERVENTIONS:  - Assess and monitor for signs and symptoms of infection  - Monitor lab/diagnostic results  - Monitor all insertion sites, i.e. indwelling lines, tubes, and drains  - Monitor endotracheal if appropriate and nasal secretions for changes in amount and color  - [place] appropriate cooling/warming therapies per order  - Administer medications as ordered  - Instruct and encourage patient and family to use good hand hygiene technique  - Identify and instruct in appropriate isolation precautions for identified infection/condition  Outcome: Progressing  Goal: Absence of fever/infection during neutropenic period  Description: INTERVENTIONS:  - Monitor WBC    Outcome: Progressing     Problem: SAFETY ADULT  Goal: Patient will remain free of falls  Description: INTERVENTIONS:  - Educate patient/family on patient safety including physical limitations  - Instruct patient to call for assistance with activity   - Consult OT/PT to assist with strengthening/mobility   - Keep Call bell within reach  - Keep bed low and locked with side rails adjusted as appropriate  - Keep care items and personal belongings within reach  - Initiate and maintain comfort rounds  - Make Fall Risk Sign visible to staff  - Offer Toileting every  Hours, in advance of need  - Initiate/Maintain alarm  - Obtain necessary fall risk management equipment:   - Apply yellow socks and bracelet for high fall risk patients  - Consider moving patient to room near nurses station  Outcome: Progressing  Goal: Maintain or return to baseline ADL function  Description: INTERVENTIONS:  -  Assess patient's ability to carry out ADLs; assess patient's baseline for ADL function and identify physical deficits which impact ability to perform ADLs (bathing, care of mouth/teeth, toileting, grooming, dressing, etc.)  - Assess/evaluate cause of self-care deficits   - Assess range of motion  - Assess patient's mobility; develop plan if impaired  - Assess patient's need for assistive devices and provide as appropriate  - Encourage maximum independence but intervene and supervise when necessary  - Involve family in performance of ADLs  - Assess for home care needs following discharge   - Consider OT consult to assist with ADL evaluation and planning for discharge  - Provide patient education as appropriate  Outcome: Progressing  Goal: Maintains/Returns to pre admission functional level  Description: INTERVENTIONS:  - Perform BMAT or MOVE assessment daily.   - Set and communicate daily mobility goal to care team and patient/family/caregiver. - Collaborate with rehabilitation services on mobility goals if consulted  - Perform Range of Motion  times a day. - Reposition patient every  hours.   - Dangle patient  times a day  - Stand patient  times a day  - Ambulate patient  times a day  - Out of bed to chair  times a day   - Out of bed for meals  times a day  - Out of bed for toileting  - Record patient progress and toleration of activity level   Outcome: Progressing     Problem: DISCHARGE PLANNING  Goal: Discharge to home or other facility with appropriate resources  Description: INTERVENTIONS:  - Identify barriers to discharge w/patient and caregiver  - Arrange for needed discharge resources and transportation as appropriate  - Identify discharge learning needs (meds, wound care, etc.)  - Arrange for interpretive services to assist at discharge as needed  - Refer to Case Management Department for coordinating discharge planning if the patient needs post-hospital services based on physician/advanced practitioner order or complex needs related to functional status, cognitive ability, or social support system  Outcome: Progressing     Problem: POSTPARTUM  Goal: Experiences normal postpartum course  Description: INTERVENTIONS:  - Monitor maternal vital signs  - Assess uterine involution and lochia  Outcome: Progressing  Goal: Appropriate maternal -  bonding  Description: INTERVENTIONS:  - Identify family support  - Assess for appropriate maternal/infant bonding   -Encourage maternal/infant bonding opportunities  - Referral to  or  as needed  Outcome: Progressing  Goal: Establishment of infant feeding pattern  Description: INTERVENTIONS:  - Assess breast/bottle feeding  - Refer to lactation as needed  Outcome: Progressing  Goal: Incision(s), wounds(s) or drain site(s) healing without S/S of infection  Description: INTERVENTIONS  - Assess and document dressing, incision, wound bed, drain sites and surrounding tissue  - Provide patient and family education  - Perform skin care/dressing changes every   Outcome: Progressing

## 2023-07-03 NOTE — CASE MANAGEMENT
Case Management Progress Note    Patient name Sharon Simmons  Location /-02 MRN [id number]  : 2000 Date 7/3/2023       LOS (days): 2  Geometric Mean LOS (GMLOS) (days):   Days to GMLOS:        OBJECTIVE:        Current admission status: Inpatient  Preferred Pharmacy:   [facility] [address] ACACIA BONILLA [address] [place] [address]  Phone: [phone] Fax: [phone]    Primary Care Provider: Tung Lou MD    Primary Insurance: [facility]  Secondary Insurance:     PROGRESS NOTE:      CM received consult for MOB with THC use during pregnancy. CM screened both MOB and baby charts. MOB UDS + for THC and baby UDS negative. CM met with MOB, FOB and MGM at bedside. MOB verbalizes consent to discussion with FOB and MGM present. CM revie UDS results and need for report to DYFS d/t exposure to infant. MOB verbalizes understanding. CM collected all necessary information to make report and review expectation for clearance of baby prior to d/c home. CM called [place] Abuse Hotline at [phone] and s/w Evelyn (# [phone]) to make report. Per [facility], report will be generated d/t substance exposed infant and sent to the local [facility].      Awaiting DCPP/DYFS clearance for baby to d/c home with MOB today

## 2023-07-03 NOTE — PROGRESS NOTES
Progress Note - OB/GYN  Usman Lawson 20 y.o. female MRN: [id number]  Unit/Bed#: -01 Encounter: [id number]    Assessment and Plan     Usman Lawson is a patient of: [facility] . She is PPD# 1 s/p  spontaneous vaginal delivery  Recovering well and is stable       *  (spontaneous vaginal delivery)  Assessment & Plan  Lochia WNL   Recovering well   Appropriate bowel and bladder function   Pain well controlled   Tolerating diet   Breastfeeding: wants to breastfeeding, but not expressing milk well, will see lactation consultant. Ambulating without issues   No lower extremity tenderness  GBS neg  Rh B+ antibody neg    Rubella non-immune status, antepartum  Assessment & Plan  MMR postpartum    Fetal growth restriction antepartum  Assessment & Plan  Normal UA Doppler  FGR by AC <10%ile    Birth weight 2465g SGA      Disposition    - Anticipate discharge home on PPD# 1      Subjective/Objective     Chief Complaint: Postpartum State     Subjective:    Usman Lawson is PPD#1 s/p  spontaneous vaginal delivery. She has no current complaints. Pain is well controlled. Patient is currently voiding. She is ambulating. Patient is currently passing flatus and has had no bowel movement. She is tolerating PO, and denies nausea or vomitting. Patient denies fever, chills, chest pain, shortness of breath, or calf tenderness. Lochia is minimal. She is  Breastfeeding. She is recovering well and is stable.        Vitals:   /56 (BP Location: Right arm)   Pulse 93   Temp 98.1 °F (36.7 °C) (Oral)   Resp 18   Ht 5' 7" (1.702 m)   Wt 78.9 kg (174 lb)   LMP 10/04/2022   SpO2 100%   Breastfeeding Yes   BMI 27.25 kg/m²       Intake/Output Summary (Last 24 hours) at 7/3/2023 3783  Last data filed at 2023 1903  Gross per 24 hour   Intake --   Output 2046 ml   Net -2046 ml       Invasive Devices     None                 Physical Exam:   GEN: Helena Meza appears well, alert and oriented x 3, pleasant and cooperative   CARDIO: RRR, no murmurs or rubs  RESP:  CTAB, no wheezes or rales  ABDOMEN: soft, no tenderness, no distention, fundus @ -1  EXTREMITIES: SCDs on, non tender, no erythema, b/l Zbigniew's sign negative      Labs:     Hemoglobin   Date Value Ref Range Status   07/01/2023 11.7 11.5 - 15.4 g/dL Final   04/19/2023 11.3 11.1 - 15.9 g/dL Final   02/17/2023 11.8 11.1 - 15.9 g/dL Final     WBC   Date Value Ref Range Status   07/01/2023 15.16 (H) 4.31 - 10.16 Thousand/uL Final     White Blood Cell Count   Date Value Ref Range Status   04/19/2023 10.7 3.4 - 10.8 x10E3/uL Final   02/17/2023 11.7 (H) 3.4 - 10.8 x10E3/uL Final     Platelet Count   Date Value Ref Range Status   04/19/2023 286 150 - 450 x10E3/uL Final   02/17/2023 246 150 - 450 x10E3/uL Final     Platelets   Date Value Ref Range Status   07/01/2023 308 149 - 390 Thousands/uL Final          Olga Hunt MD  7/3/2023  6:21 AM

## 2023-07-03 NOTE — LACTATION NOTE
This note was copied from a baby's chart. CONSULT - LACTATION  Baby Boy Claudia Garcia 1 days male MRN: [id number]    [address] [facility] Room / Bed: (N)/(N) Encounter: [id number]    Maternal Information     MOTHER:  Helena Garcia  Maternal Age: 20 y.o.   OB History: # 1 - Date: 23, Sex: Male, Weight: 2465 g (5 lb 7 oz), GA: 38w5d, Delivery: Vaginal, Spontaneous, Apgar1: 8, Apgar5: 9, Living: Living, Birth Comments: None   Previouse breast reduction surgery? No    Lactation history:   Has patient previously breast fed: No   How long had patient previously breast fed:     Previous breast feeding complications:     History reviewed. No pertinent surgical history. Birth information:  YOB: 2024   Time of birth: 3:03 PM   Sex: male   Delivery type: Vaginal, Spontaneous   Birth Weight: 2465 g (5 lb 7 oz)   Percent of Weight Change: 0%     Gestational Age: 40w9d   [unfilled]    Assessment     Breast and nipple assessment: no clinical assessment     Assessment: no clinical assessment    Feeding assessment: no clinical assessment  LATCH:  Latch: Audible Swallowing:     Type of Nipple:     Comfort (Breast/Nipple):     Hold (Positioning):     LATCH Score:            Feeding recommendations:  mix feeding plan created as mom has been giving formula since birth and cultural belief that colostrum is not engout enough. Mom states pumping is not getting colostrum out of the breast. Maternal grandmother has been the primary feeding the baby. She is using the bottle. Verbal Ed. On paced bottle feeding. No demonstration of information. Ed. On feeding plan to create milk supply. Handouts and RSB/DC reviewed    Mom wants a zomee - order sent to     Handouts: Mother-led latch,   1st 2 weeks,   Importance of Latch  Latch Checklist   WHO How to mix formula,   Pumping Log,   LPI,     Enc.  To call lactation for next feeding time to assist with plan and creating milk supply. Discharge feeding plan    1. Meet early feeding cues  2. Use massage, warmth, hand expression to stimulate breasts  3. Align nipple to nose, place chin on the breast, (move baby not breast) and bring baby to breast when mouth is wide and deep latch is achieved. (Scan QR code for [facility] - positions)  4. When/ if baby unlatches, use paced bottle feeding method for feeds. Review Milkmob on Youtube or scan QR code. 5. Burp baby and offer the second breast. Allow baby to attempt. 6. Allow the baby to do non-nutritive suck and skin to skin at the breast  7. Pump after each feed to stimulate breasts and have expressed milk for next feed        [address] - positions      Information on hand expression given. Discussed benefits of knowing how to manually express breast including stimulating milk supply, softening nipple for latch and evacuating breast in the event of engorgement. Mom is encouraged to place baby skin to skin for feedings. Skin to skin education provided for baby placement on mother's chest, baby only in diaper, blankets below shoulders on baby's back. Skin to skin is encouraged to continue at home for feedings and between feedings. Worked on positioning infant up at chest level and starting to feed infant with nose arriving at the nipple. Then, using areolar compression to achieve a deep latch that is comfortable and exchanges optimum amounts of milk. - Start feedings on breast that last feeding ended   - allow no more than 3 hours between breast feeding sessions   - time between feedings is counted from the beginning of the first feed to the beginning of the next feeding session    Reviewed early signs of hunger, including tensing of hands and shoulders - no need to wait for open eyes. Crying is a late hunger sign. If baby is crying, soothe baby first and then attempt to latch.   Reviewed normal sucking patterns: transition from stimulation to nutritive to release or non-nutritive. The goal is to see and hear lots of swallowing. Reviewed normal nursing pattern: infant could latch on one breast up to 30 minutes or until releases on own. Signs of satiation is open hand with fingers that do not grab your finger. Discussed difference in sensation of non-nutritive v nutritive sucking    Met with mother. Provided mother with Ready, Set, Baby booklet. Discussed Skin to Skin contact an benefits to mom and baby. Talked about the delay of the first bath until baby has adjusted. Spoke about the benefits of rooming in. Feeding on cue and what that means for recognizing infant's hunger. Avoidance of pacifiers for the first month discussed. Talked about exclusive breastfeeding for the first 6 months. Positioning and latch reviewed as well as showing images of other feeding positions. Discussed the properties of a good latch in any position. Reviewed hand/manual expression. Discussed s/s that baby is getting enough milk and some s/s that breastfeeding dyad may need further help. Gave information on common concerns, what to expect the first few weeks after delivery, preparing for other caregivers, and how partners can help. Resources for support also provided. Encouraged parents to call for assistance, questions, and concerns about breastfeeding. Extension provided. Provided education on growth spurts, when to introduce bottles; paced bottle feeding, and non-nutritive suck at the breast. Provided education on Signs of satiation. Encouraged to call lactation to observe a latch prior to discharge for reassurance. Encouraged to call baby and me with any questions and closely monitor output.       Marleni Henderson 7/3/2023 10:55 AM

## 2023-07-05 NOTE — UTILIZATION REVIEW
NOTIFICATION OF INPATIENT ADMISSION   MATERNITY/DELIVERY AUTHORIZATION REQUEST   SERVICING FACILITY:   [address] [place] [address] - L&D, , NICU  [address] [facility]. [address]  Tax ID: [id number]  NPI: [id number]   ATTENDING PROVIDER:  Attending Name and NPI#: Tawanna Fried Md [[id number]]  Address: [address] [place] [address]  Phone: [phone]   ADMISSION INFORMATION:  [facility]   Place of Service Code: 21  Inpatient Admission Date/Time: 23  8:08 PM  Discharge Date/Time: 7/3/2023  8:20 PM  Admitting Diagnosis Code/Description:  Abdominal pain affecting pregnancy [O26.899, R10.9]  Encounter for induction of labor [Z34.90]  38 weeks gestation of pregnancy [Z3A.38]  Encounter for full-term uncomplicated delivery [X52]     Mother: Manny Costello 2000 Estimated Date of Delivery: 23  Delivering clinician: Ronak Or Jorge A    OB History        1    Para   1    Term   1       0    AB   0    Living   1       SAB   0    IAB   0    Ectopic   0    Multiple   0    Live Births   1                Name & MRN:   Information for the patient's :  Shannan Polanco [[id number]]      Delivery Information:  Sex: male  Delivered 2023 3:03 PM by Vaginal, Spontaneous; Gestational Age: 40w9d    [place] Measurements:  Weight: 5 lb 7 oz (2465 g); Height: 17"    APGAR 1 minute 5 minutes 10 minutes   Totals: 8 9       Birth Information: 20 y.o. female MRN: [id number] Unit/Bed#: -01   Birthweight: No birth weight on file. Gestational Age: <None> Delivery Type:    APGARS Totals:        UTILIZATION REVIEW CONTACT:  Orestes Dumont Utilization   Network Utilization Review Department  Phone: [phone]  Fax [phone]  Email: [email or web address]  Contact for approvals/pending authorizations, clinical reviews, and discharge.      PHYSICIAN ADVISORY SERVICES:  Medical Necessity Denial & Xylp-jo-Elih Review  Phone: [phone]  Fax: [phone]  Email: [email or web address]           NOTIFICATION OF ADMISSION DISCHARGE   This is a Notification of Discharge from [address] [facility] [address] Please be advised that this patient has been discharge from our facility. Below you will find the admission and discharge date and time including the patient’s disposition. UTILIZATION REVIEW CONTACT:  Jose Alejandro Bruno  Utilization   Network Utilization Review Department  Phone: [phone] x carefully listen to the prompts. All voicemails are confidential.  Email: [email or web address]     ADMISSION INFORMATION  PRESENTATION DATE: 7/1/2023  8:08 PM  OBERVATION ADMISSION DATE:   INPATIENT ADMISSION DATE: 7/1/23  8:08 PM   DISCHARGE DATE: 7/3/2023  8:20 PM   DISPOSITION:Home/Self Care    IMPORTANT INFORMATION:  Send all requests for admission clinical reviews, approved or denied determinations and any other requests to dedicated fax number below belonging to the campus where the patient is receiving treatment.  List of dedicated fax numbers:  [place] DENIALS (Administrative/Medical Necessity) [phone] [facility] [phone]   [facility] [phone]   [facility] [phone] [facility] [phone]   [address] [place] [address] [phone]   [facility] [phone]   [facility] [phone]   [address] [place] [address] [phone]   [address] [place] [address] [phone] [address] [facility] [address] [facility] [phone]

## 2023-07-05 NOTE — UTILIZATION REVIEW
NOTIFICATION OF INPATIENT ADMISSION   MATERNITY/DELIVERY AUTHORIZATION REQUEST   SERVICING FACILITY:   [address] [place] [address] - L&D, , NICU  [facility]. [address]  Tax ID: [id number]  NPI: [id number]   ATTENDING PROVIDER:  Attending Name and NPI#: Spencer Sheppard Md [[id number]]  Address: [address] [place] [address]  Phone: [phone]   ADMISSION INFORMATION:  [facility]   Place of Service Code: 21  Inpatient Admission Date/Time: 23  8:08 PM  Discharge Date/Time: 7/3/2023  8:20 PM  Admitting Diagnosis Code/Description:  Abdominal pain affecting pregnancy [O26.899, R10.9]  Encounter for induction of labor [Z34.90]  38 weeks gestation of pregnancy [Z3A.38]  Encounter for full-term uncomplicated delivery [T02]     Mother: Sis Reid 2000 Estimated Date of Delivery: 23  Delivering clinician: Lisa Jules    OB History        1    Para   1    Term   1       0    AB   0    Living   1       SAB   0    IAB   0    Ectopic   0    Multiple   0    Live Births   1               Lambert Name & MRN:   Information for the patient's :  Brigitte Andrews [[id number]]     Lambert Delivery Information:  Sex: male  Delivered 2023 3:03 PM by Vaginal, Spontaneous; Gestational Age: 40w9d     Measurements:  Weight: 5 lb 7 oz (2465 g); Height: 17"    APGAR 1 minute 5 minutes 10 minutes   Totals: 8 9       Birth Information: 20 y.o. female MRN: [id number] Unit/Bed#: -01   Birthweight: No birth weight on file. Gestational Age: <None> Delivery Type:    APGARS Totals:        UTILIZATION REVIEW CONTACT:  Micheline Bedoya, Utilization   Network Utilization Review Department  Phone: [phone]  Fax [phone]  Email: [email or web address]  Contact for approvals/pending authorizations, clinical reviews, and discharge.      PHYSICIAN ADVISORY SERVICES:  Medical Necessity Denial & Pdeg-qd-Ndmp Review  Phone: [phone]  Fax: [phone]  Email: [email or web address]         NOTIFICATION OF ADMISSION DISCHARGE   This is a Notification of Discharge from [address] [facility] [address] Please be advised that this patient has been discharge from our facility. Below you will find the admission and discharge date and time including the patient’s disposition. UTILIZATION REVIEW CONTACT:  Steph Severance  Utilization   Network Utilization Review Department  Phone: [phone] x carefully listen to the prompts. All voicemails are confidential.  Email: [email or web address]     ADMISSION INFORMATION  PRESENTATION DATE: 7/1/2023  8:08 PM  OBERVATION ADMISSION DATE:   INPATIENT ADMISSION DATE: 7/1/23  8:08 PM   DISCHARGE DATE: 7/3/2023  8:20 PM   DISPOSITION:Home/Self Care    IMPORTANT INFORMATION:  Send all requests for admission clinical reviews, approved or denied determinations and any other requests to dedicated fax number below belonging to the campus where the patient is receiving treatment.  List of dedicated fax numbers:  [place] DENIALS (Administrative/Medical Necessity) [phone] [address] [facility] (Maternity/NICU/Pediatrics) [phone]   [facility] [phone]   [facility] [phone] 16[address] [facility] [phone]   [address] [place], [place] [phone]   [facility] [phone]   [facility] [phone]   [address] [place] [address] [phone]   [address] [place] [address] [phone] [address] [facility] [address] [facility] [phone]

## 2023-07-06 LAB
DME PARACHUTE DELIVERY DATE ACTUAL: NORMAL
DME PARACHUTE DELIVERY DATE REQUESTED: NORMAL
DME PARACHUTE ITEM DESCRIPTION: NORMAL
DME PARACHUTE ORDER STATUS: NORMAL
DME PARACHUTE SUPPLIER NAME: NORMAL
DME PARACHUTE SUPPLIER PHONE: NORMAL

## 2023-07-06 PROCEDURE — 88307 TISSUE EXAM BY PATHOLOGIST: CPT | Performed by: PATHOLOGY

## 2023-12-11 ENCOUNTER — INITIAL PRENATAL (OUTPATIENT)
Dept: OBGYN CLINIC | Facility: CLINIC | Age: 23
End: 2023-12-11

## 2023-12-11 VITALS — BODY MASS INDEX: 23.02 KG/M2 | SYSTOLIC BLOOD PRESSURE: 130 MMHG | DIASTOLIC BLOOD PRESSURE: 70 MMHG | WEIGHT: 147 LBS

## 2023-12-11 DIAGNOSIS — Z36.89 ENCOUNTER FOR OTHER SPECIFIED ANTENATAL SCREENING: ICD-10-CM

## 2023-12-11 DIAGNOSIS — Z34.81 PRENATAL CARE, SUBSEQUENT PREGNANCY, FIRST TRIMESTER: Primary | ICD-10-CM

## 2023-12-11 PROCEDURE — PNV: Performed by: NURSE PRACTITIONER

## 2023-12-11 NOTE — PROGRESS NOTES
Deondre Moreno is a 24 y.o.  with a  on 23. Pregnancy complicated by hyperemesis, FGR. Short interval pregnancy. Presents today with her her new boyfriend for her OB intake. Patient's last menstrual period was 10/04/2022. This was a unplanned pregnancy. Last pap smear: none on file. Past Medical History:   Diagnosis Date    ADHD        History reviewed. No pertinent surgical history. Social History:Denies Tobacco or ETOH. She does smoke marijuana. Reviewed importance off cessation of marijuana use in pregnancy. Pets: none    Reviewed the following educational topics with patient:                -routine prenatal visit/ultrasound/labwork schedule   - genetic screening options - SQS, NIPS   - TDAP/Influenza              -nutritional demands of pregnancy, healthy dietary habits              -listeria, toxoplasmosis, seafood precautions              -weight gain expectations (based on pre-pregnant BMI)              -exercise, rest, and sexual activity during pregnancy              -abstinence from alcohol, tobacco, and illegal drugs              -common discomforts of pregnancy and appropriate management              -OTC medications safe to use in pregnancy              -WIC referral              -symptoms to report to OB provider                          -signs of PTL                          -vaginal bleeding/leaking of fluid                          -severe n/v-unable to tolerate ANY food/fluids for more than 24 hours    No hx of MRSA. No travel plans. Initial prenatal labs ordered. Referral for the  center placed for genetic screening. She will schedule an appt. Prenatal course of care reviewed with patient. Schedule for prenatal care visits signed. Bedside TAUS: Viable IUP measuring CRL 3.33 cm 10w 3d. . MORENA set based on LMP. Size=Dates.     Diagnoses and all orders for this visit:    Prenatal care, subsequent pregnancy, first trimester    Encounter for other specified  screening  -     HIV 1/2 AG/AB w Reflex [facility] for 2 yr old and above; Future  -     Hepatitis C antibody; Future  -     UA (URINE) with reflex to Scope  -     Urine culture; Future  -     Hepatitis B surface antigen; Future  -     CBC and differential; Future  -     Rubella antibody, IgG; Future  -     Type and screen; Future  -     RPR-Syphilis Screening (Total Syphilis IGG/IGM); Future  -     Hepatitis B surface antibody; Future  -     Anemia Panel w/Reflex, OB; Future  -     Ambulatory Referral to Maternal Fetal Medicine; Future  -     Hepatitis C antibody  -     Urine culture  -     CBC and differential  -     Rubella antibody, IgG  -     Type and screen  -     Hepatitis B surface antibody      RTO in 4 weeks for prenatal physical with pap smear and cultures.

## 2023-12-19 ENCOUNTER — TELEPHONE (OUTPATIENT)
Facility: HOSPITAL | Age: 23
End: 2023-12-19

## 2024-01-04 ENCOUNTER — ROUTINE PRENATAL (OUTPATIENT)
Facility: HOSPITAL | Age: 24
End: 2024-01-04
Payer: COMMERCIAL

## 2024-01-04 ENCOUNTER — APPOINTMENT (OUTPATIENT)
Dept: LAB | Facility: CLINIC | Age: 24
End: 2024-01-04
Payer: COMMERCIAL

## 2024-01-04 ENCOUNTER — TELEPHONE (OUTPATIENT)
Facility: HOSPITAL | Age: 24
End: 2024-01-04

## 2024-01-04 VITALS
WEIGHT: 151.46 LBS | BODY MASS INDEX: 23.77 KG/M2 | HEIGHT: 67 IN | SYSTOLIC BLOOD PRESSURE: 132 MMHG | DIASTOLIC BLOOD PRESSURE: 70 MMHG | HEART RATE: 100 BPM

## 2024-01-04 DIAGNOSIS — Z36.89 ENCOUNTER FOR OTHER SPECIFIED ANTENATAL SCREENING: ICD-10-CM

## 2024-01-04 DIAGNOSIS — O09.891 SHORT INTERVAL BETWEEN PREGNANCIES COMPLICATING PREGNANCY, ANTEPARTUM, FIRST TRIMESTER: ICD-10-CM

## 2024-01-04 DIAGNOSIS — Z33.1 PREGNANT STATE, INCIDENTAL: ICD-10-CM

## 2024-01-04 DIAGNOSIS — Z36.9 UNSPECIFIED ANTENATAL SCREENING: ICD-10-CM

## 2024-01-04 DIAGNOSIS — Z3A.13 13 WEEKS GESTATION OF PREGNANCY: ICD-10-CM

## 2024-01-04 DIAGNOSIS — Z36.82 ENCOUNTER FOR ANTENATAL SCREENING FOR NUCHAL TRANSLUCENCY: Primary | ICD-10-CM

## 2024-01-04 DIAGNOSIS — Z36.89 ENCOUNTER FOR OTHER SPECIFIED ANTENATAL SCREENING: Primary | ICD-10-CM

## 2024-01-04 DIAGNOSIS — O09.291 HISTORY OF INTRAUTERINE GROWTH RESTRICTION IN PRIOR PREGNANCY, CURRENTLY PREGNANT, FIRST TRIMESTER: ICD-10-CM

## 2024-01-04 LAB
ABO GROUP BLD: NORMAL
BACTERIA UR QL AUTO: ABNORMAL /HPF
BASOPHILS # BLD AUTO: 0.02 THOUSANDS/ÂΜL (ref 0–0.1)
BASOPHILS NFR BLD AUTO: 0 % (ref 0–1)
BILIRUB UR QL STRIP: NEGATIVE
BLD GP AB SCN SERPL QL: NEGATIVE
CAOX CRY URNS QL MICRO: ABNORMAL /HPF
CLARITY UR: ABNORMAL
COLOR UR: YELLOW
EOSINOPHIL # BLD AUTO: 0.06 THOUSAND/ÂΜL (ref 0–0.61)
EOSINOPHIL NFR BLD AUTO: 1 % (ref 0–6)
ERYTHROCYTE [DISTWIDTH] IN BLOOD BY AUTOMATED COUNT: 16.5 % (ref 11.6–15.1)
GLUCOSE UR STRIP-MCNC: NEGATIVE MG/DL
HBV SURFACE AG SER QL: NORMAL
HCT VFR BLD AUTO: 37.9 % (ref 34.8–46.1)
HCV AB SER QL: NORMAL
HGB BLD-MCNC: 12.5 G/DL (ref 11.5–15.4)
HGB UR QL STRIP.AUTO: NEGATIVE
HIV 1+2 AB+HIV1 P24 AG SERPL QL IA: NORMAL
HIV 2 AB SERPL QL IA: NORMAL
HIV1 AB SERPL QL IA: NORMAL
HIV1 P24 AG SERPL QL IA: NORMAL
HYALINE CASTS #/AREA URNS LPF: ABNORMAL /LPF
IMM GRANULOCYTES # BLD AUTO: 0.02 THOUSAND/UL (ref 0–0.2)
IMM GRANULOCYTES NFR BLD AUTO: 0 % (ref 0–2)
KETONES UR STRIP-MCNC: ABNORMAL MG/DL
LEUKOCYTE ESTERASE UR QL STRIP: ABNORMAL
LYMPHOCYTES # BLD AUTO: 2.33 THOUSANDS/ÂΜL (ref 0.6–4.47)
LYMPHOCYTES NFR BLD AUTO: 25 % (ref 14–44)
MCH RBC QN AUTO: 27.4 PG (ref 26.8–34.3)
MCHC RBC AUTO-ENTMCNC: 33 G/DL (ref 31.4–37.4)
MCV RBC AUTO: 83 FL (ref 82–98)
MONOCYTES # BLD AUTO: 0.71 THOUSAND/ÂΜL (ref 0.17–1.22)
MONOCYTES NFR BLD AUTO: 8 % (ref 4–12)
MUCOUS THREADS UR QL AUTO: ABNORMAL
NEUTROPHILS # BLD AUTO: 6.07 THOUSANDS/ÂΜL (ref 1.85–7.62)
NEUTS SEG NFR BLD AUTO: 66 % (ref 43–75)
NITRITE UR QL STRIP: NEGATIVE
NON-SQ EPI CELLS URNS QL MICRO: ABNORMAL /HPF
NRBC BLD AUTO-RTO: 0 /100 WBCS
PH UR STRIP.AUTO: 6 [PH]
PLATELET # BLD AUTO: 266 THOUSANDS/UL (ref 149–390)
PMV BLD AUTO: 10 FL (ref 8.9–12.7)
PROT UR STRIP-MCNC: ABNORMAL MG/DL
RBC # BLD AUTO: 4.57 MILLION/UL (ref 3.81–5.12)
RBC #/AREA URNS AUTO: ABNORMAL /HPF
RH BLD: POSITIVE
RUBV IGG SERPL IA-ACNC: 16.8 IU/ML
SP GR UR STRIP.AUTO: 1.03 (ref 1–1.03)
SPECIMEN EXPIRATION DATE: NORMAL
TREPONEMA PALLIDUM IGG+IGM AB [PRESENCE] IN SERUM OR PLASMA BY IMMUNOASSAY: NORMAL
UROBILINOGEN UR STRIP-ACNC: <2 MG/DL
WBC # BLD AUTO: 9.21 THOUSAND/UL (ref 4.31–10.16)
WBC #/AREA URNS AUTO: ABNORMAL /HPF

## 2024-01-04 PROCEDURE — 86900 BLOOD TYPING SEROLOGIC ABO: CPT

## 2024-01-04 PROCEDURE — 76813 OB US NUCHAL MEAS 1 GEST: CPT | Performed by: OBSTETRICS & GYNECOLOGY

## 2024-01-04 PROCEDURE — 99243 OFF/OP CNSLTJ NEW/EST LOW 30: CPT | Performed by: OBSTETRICS & GYNECOLOGY

## 2024-01-04 PROCEDURE — 87086 URINE CULTURE/COLONY COUNT: CPT

## 2024-01-04 PROCEDURE — 81001 URINALYSIS AUTO W/SCOPE: CPT

## 2024-01-04 PROCEDURE — 86706 HEP B SURFACE ANTIBODY: CPT

## 2024-01-04 PROCEDURE — 86850 RBC ANTIBODY SCREEN: CPT

## 2024-01-04 PROCEDURE — 36415 COLL VENOUS BLD VENIPUNCTURE: CPT

## 2024-01-04 PROCEDURE — 86901 BLOOD TYPING SEROLOGIC RH(D): CPT

## 2024-01-04 PROCEDURE — 87340 HEPATITIS B SURFACE AG IA: CPT

## 2024-01-04 PROCEDURE — 86780 TREPONEMA PALLIDUM: CPT

## 2024-01-04 PROCEDURE — 86762 RUBELLA ANTIBODY: CPT

## 2024-01-04 PROCEDURE — 76801 OB US < 14 WKS SINGLE FETUS: CPT | Performed by: OBSTETRICS & GYNECOLOGY

## 2024-01-04 PROCEDURE — 87389 HIV-1 AG W/HIV-1&-2 AB AG IA: CPT

## 2024-01-04 PROCEDURE — 85025 COMPLETE CBC W/AUTO DIFF WBC: CPT

## 2024-01-04 PROCEDURE — 86803 HEPATITIS C AB TEST: CPT

## 2024-01-04 RX ORDER — ASPIRIN 81 MG/1
162 TABLET, CHEWABLE ORAL
Qty: 180 TABLET | Refills: 1 | Status: SHIPPED | OUTPATIENT
Start: 2024-01-04 | End: 2024-04-03

## 2024-01-04 NOTE — PROGRESS NOTES
"Patient chose to have [facility] Non-invasive Prenatal Screen with fetal sex.   Patient choose billed through insurance.   Patient assistance program (PAP) application provided to patient no.  PAP sent with specimen No.     Patient given brochure and is aware [employer] will contact patients insurance and coordinate coverage.  Patient made aware she will receive a text message and e-mail from [employer].   Patient informed text message will come from area code  \"415\".   Provided [employer] Client Services # [phone] and web site at [email or web address].    Blood collection tubes labeled with patient identifiers (name, date of birth).     Printed [employer] lab order and test kit given with FED EX packaging (Tracking # [id number]). Patient instructed to take to a [facility] outpatient lab for blood collection.  Requested patient notify MFM (via phone call or [employer] message) when blood collected so office can follow up on results.     Copy of lab order scanned to Epic media.    Maternal Fetal Medicine will have results in approximately 7-10 business days and will call patient or notify via [employer].  Patient aware viewing lab result online will reveal fetal sex If ordered.    Patient verbalized understanding of all instructions and no questions at this time.           "

## 2024-01-04 NOTE — LETTER
January 6, 2024     Laura Jules MD  [address] [facility] [address]    Patient: Helena Garcia   YOB: 2000   Date of Visit: 1/4/2024       Dear Dr. Jules:    Thank you for referring Helena Garcia to me for evaluation. Below are my notes for this consultation.    If you have questions, please do not hesitate to call me. I look forward to following your patient along with you.         Sincerely,        Jason Arndt MD        CC: No Recipients    Jason Arndt MD  1/4/2024  2:46 PM  Sign when Signing Visit  Please refer to the [facility] ultrasound report in Ob Procedures for additional information regarding today's visit

## 2024-01-04 NOTE — PROGRESS NOTES
Please refer to the [facility] ultrasound report in Ob Procedures for additional information regarding today's visit

## 2024-01-04 NOTE — TELEPHONE ENCOUNTER
Nuchal translucency rescheduled for today at 2:30 pm in our [place] office.     Called [employer] spoke with Demetrio @ # [phone] to set up [employer] transportation for patient's Maternal Fetal Medicine appointment.  Appointment scheduled on  January 4, 2024 at 230 PM at our [place].     Spoke with patient's mother and explained [employer] will pick-up patient at 1:50 PM for Maternal Fetal Medicine appointment.   Patient instructed she has 5 minutes maximum to get into car upon arrival. Patient's mother verbalized understanding. Patients phone is currently off so the mother is the only point of contact.     Pick -up   [address] [place] [address]  [phone]    Patient's mother called me back @ 0802 and they want to cancel Lyft services for today's appointment. Patient's boyfriend will bring her to this appointment. Confirmed arrival time of 2:15 pm. @0803 Called [employer] and cancelled today's Ly[employer].

## 2024-01-05 LAB — HBV SURFACE AB SER-ACNC: 7.25 MIU/ML

## 2024-01-06 LAB
BACTERIA UR CULT: NORMAL
MISCELLANEOUS LAB TEST RESULT: NORMAL

## 2024-01-11 ENCOUNTER — TELEPHONE (OUTPATIENT)
Facility: HOSPITAL | Age: 24
End: 2024-01-11

## 2024-01-11 NOTE — TELEPHONE ENCOUNTER
Contacted patient and spoke with her mother in regards to [facility] (NIPS) blood work. Unfortunately, patient will need a nurse visit for a re-draw. Nurse visit is scheduled for t our [place] office. Original RQ [id number].   Reason for re-draw per [facility]'s sample failure Laboratory processing issues: These types of failures are not expected to be related to specimen collection or specimen-specific quality metrics. .     Offered Monday January 15, 2024. Patient's mother declined because they have a  to attend.

## 2024-01-21 LAB
BASOPHILS # BLD AUTO: 0 X10E3/UL (ref 0–0.2)
BASOPHILS NFR BLD AUTO: 0 %
EOSINOPHIL # BLD AUTO: 0.1 X10E3/UL (ref 0–0.4)
EOSINOPHIL NFR BLD AUTO: 1 %
ERYTHROCYTE [DISTWIDTH] IN BLOOD BY AUTOMATED COUNT: 16.3 % (ref 11.7–15.4)
FERRITIN SERPL-MCNC: 24 NG/ML (ref 15–150)
FOLATE SERPL-MCNC: 11.4 NG/ML
HBV SURFACE AG SERPL QL IA: NEGATIVE
HCT VFR BLD AUTO: 38.5 % (ref 34–46.6)
HGB BLD-MCNC: 13.1 G/DL (ref 11.1–15.9)
HIV 1+2 AB+HIV1 P24 AG SERPL QL IA: NON REACTIVE
IMM GRANULOCYTES # BLD: 0 X10E3/UL (ref 0–0.1)
IMM GRANULOCYTES NFR BLD: 0 %
IRON SATN MFR SERPL: 25 % (ref 15–55)
IRON SERPL-MCNC: 103 UG/DL (ref 27–159)
LYMPHOCYTES # BLD AUTO: 2.2 X10E3/UL (ref 0.7–3.1)
LYMPHOCYTES NFR BLD AUTO: 25 %
MCH RBC QN AUTO: 28.6 PG (ref 26.6–33)
MCHC RBC AUTO-ENTMCNC: 34 G/DL (ref 31.5–35.7)
MCV RBC AUTO: 84 FL (ref 79–97)
MONOCYTES # BLD AUTO: 0.6 X10E3/UL (ref 0.1–0.9)
MONOCYTES NFR BLD AUTO: 7 %
NEUTROPHILS # BLD AUTO: 5.7 X10E3/UL (ref 1.4–7)
NEUTROPHILS NFR BLD AUTO: 67 %
PLATELET # BLD AUTO: 265 X10E3/UL (ref 150–450)
RBC # BLD AUTO: 4.58 X10E6/UL (ref 3.77–5.28)
RETICS/RBC NFR AUTO: 1.2 % (ref 0.6–2.6)
RPR SER QL: NON REACTIVE
TIBC SERPL-MCNC: 404 UG/DL (ref 250–450)
UIBC SERPL-MCNC: 301 UG/DL (ref 131–425)
VIT B12 SERPL-MCNC: 346 PG/ML (ref 232–1245)
WBC # BLD AUTO: 8.6 X10E3/UL (ref 3.4–10.8)

## 2024-01-22 LAB
ABO GROUP BLD: NORMAL
BACTERIA UR CULT: NORMAL
BASOPHILS # BLD AUTO: 0 X10E3/UL (ref 0–0.2)
BASOPHILS NFR BLD AUTO: 0 %
BLD GP AB SCN SERPL QL: NEGATIVE
EOSINOPHIL # BLD AUTO: 0.1 X10E3/UL (ref 0–0.4)
EOSINOPHIL NFR BLD AUTO: 1 %
ERYTHROCYTE [DISTWIDTH] IN BLOOD BY AUTOMATED COUNT: 16.3 % (ref 11.7–15.4)
HBV SURFACE AB SER-ACNC: 6.3 MIU/ML
HCT VFR BLD AUTO: 37.9 % (ref 34–46.6)
HCV AB S/CO SERPL IA: NON REACTIVE
HGB BLD-MCNC: 12.8 G/DL (ref 11.1–15.9)
IMM GRANULOCYTES # BLD: 0 X10E3/UL (ref 0–0.1)
IMM GRANULOCYTES NFR BLD: 0 %
LYMPHOCYTES # BLD AUTO: 2.2 X10E3/UL (ref 0.7–3.1)
LYMPHOCYTES NFR BLD AUTO: 26 %
Lab: NORMAL
MCH RBC QN AUTO: 28.2 PG (ref 26.6–33)
MCHC RBC AUTO-ENTMCNC: 33.8 G/DL (ref 31.5–35.7)
MCV RBC AUTO: 84 FL (ref 79–97)
MONOCYTES # BLD AUTO: 0.6 X10E3/UL (ref 0.1–0.9)
MONOCYTES NFR BLD AUTO: 7 %
NEUTROPHILS # BLD AUTO: 5.4 X10E3/UL (ref 1.4–7)
NEUTROPHILS NFR BLD AUTO: 66 %
PLATELET # BLD AUTO: 261 X10E3/UL (ref 150–450)
RBC # BLD AUTO: 4.54 X10E6/UL (ref 3.77–5.28)
RH BLD: POSITIVE
RUBV IGG SERPL IA-ACNC: 2.44 INDEX
WBC # BLD AUTO: 8.4 X10E3/UL (ref 3.4–10.8)

## 2024-01-23 ENCOUNTER — ROUTINE PRENATAL (OUTPATIENT)
Dept: OBGYN CLINIC | Facility: CLINIC | Age: 24
End: 2024-01-23

## 2024-01-23 VITALS — SYSTOLIC BLOOD PRESSURE: 120 MMHG | BODY MASS INDEX: 23.02 KG/M2 | WEIGHT: 147 LBS | DIASTOLIC BLOOD PRESSURE: 70 MMHG

## 2024-01-23 DIAGNOSIS — Z34.82 PRENATAL CARE, SUBSEQUENT PREGNANCY, SECOND TRIMESTER: Primary | ICD-10-CM

## 2024-01-23 DIAGNOSIS — Z36.89 ENCOUNTER FOR OTHER SPECIFIED ANTENATAL SCREENING: ICD-10-CM

## 2024-01-23 PROCEDURE — PNV: Performed by: NURSE PRACTITIONER

## 2024-01-23 NOTE — PROGRESS NOTES
Helena Garcia is a 23 y.o.  at 16w4d. Reports not feeling movement yet.  She is doing well. Denies LOF/Bleeding/Cramping. Denies n/v/Ha, edema. + tobacco use. Denies ETOH or drug use. Denies DV.     Visit Vitals  /70   Wt 66.7 kg (147 lb)   LMP 2023   BMI 23.02 kg/m²   OB Status Pregnant   Smoking Status Never   BSA 1.77 m²       Urine neg/neg  Prenatal physical completed in rooming tab. Pap and cultures collected.     Diagnoses and all orders for this visit:    Prenatal care, subsequent pregnancy, second trimester  -     Genital Comprehensive Culture    Encounter for other specified  screening  -     Alpha fetoprotein, maternal; Future  -     Alpha fetoprotein, maternal  -     IGP,CtNgTv,rfx Aptima HPV ASCU  -     Genital Comprehensive Culture      Results will be released to [facility], if abnormal will call to review and discuss treatment plan.     RTO in 4 weeks for PNV or sooner if needed.

## 2024-01-27 LAB
BACTERIA GENITAL AEROBE CULT: ABNORMAL
Lab: ABNORMAL
Lab: ABNORMAL

## 2024-01-29 ENCOUNTER — TELEPHONE (OUTPATIENT)
Dept: PERINATAL CARE | Facility: CLINIC | Age: 24
End: 2024-01-29

## 2024-01-30 NOTE — TELEPHONE ENCOUNTER
I called Helena to discuss the mixup with her [facility] results 0n 1/29/24.  The initial result required another redraw.  Her last pregnancy test results are in the system and were drawn on January 9 2023 and so a copy of those test results were then scanned in under her 1/9/24 lab order in error and I reported normal results to her through my chart but this was in error as I did not realize till today that I was reporting on last years results. I asked if she has time to come for another blood draw. She uses her mothers phone so it is hard for us to reach her when her mother is at work to schedule her repeat blood work. She would like to be scheduled for a blood draw on 2/5/24 at 2 pm at [facility]. I told her I would have my  staff schedule this and it should show up on her schedule in my chart.      Now on 1/31/2024 I found out that [facility] is no longer going to be our lab of choice as of 2/5/2024 as they no longer going to be completing NIPT.  We are going to be sending out blood work now to [facility].  [facility] since [facility] is [facility] but repeat blood work through [facility] would be a new separate charge as it is being sent to a to a new lab.  I called her mother's phone number and left a message for her to contact our  at [phone] to let us know if she can arrange to have blood work drawn before February 5.     Sharyn Harris MD

## 2024-02-06 LAB
C TRACH RRNA CVX QL NAA+PROBE: NEGATIVE
CYTOLOGIST CVX/VAG CYTO: ABNORMAL
DX ICD CODE: ABNORMAL
DX ICD CODE: ABNORMAL
HPV I/H RISK 4 DNA CVX QL PROBE+SIG AMP: POSITIVE
N GONORRHOEA RRNA CVX QL NAA+PROBE: NEGATIVE
OTHER STN SPEC: ABNORMAL
OTHER STN SPEC: ABNORMAL
PATH REPORT.FINAL DX SPEC: ABNORMAL
PATHOLOGIST CVX/VAG CYTO: ABNORMAL
RECOM F/U CVX/VAG CYTO: ABNORMAL
SL AMB NOTE:: ABNORMAL
SL AMB SPECIMEN ADEQUACY: ABNORMAL
SL AMB TEST METHODOLOGY: ABNORMAL
T VAGINALIS RRNA SPEC QL NAA+PROBE: NEGATIVE

## 2024-02-20 ENCOUNTER — TELEPHONE (OUTPATIENT)
Facility: HOSPITAL | Age: 24
End: 2024-02-20

## 2024-02-20 NOTE — TELEPHONE ENCOUNTER
Called [employer] spoke with Ed @ # [phone] to set up [facility] transportation for patient's Maternal Fetal Medicine appointment.  Appointment scheduled on  February 21, 2024 at 2:30 PM at our [place].       Spoke with patient and explained LYFT will pick-up patient at 1:30 PM for Maternal Fetal Medicine appointment.   Patient instructed she has 5 minutes maximum to get into car upon arrival. Patient verbalized understanding.     Confirmed phone and address.   [phone]  [address] [place], [place] [id number]

## 2024-02-21 ENCOUNTER — ROUTINE PRENATAL (OUTPATIENT)
Facility: HOSPITAL | Age: 24
End: 2024-02-21
Attending: OBSTETRICS & GYNECOLOGY
Payer: COMMERCIAL

## 2024-02-21 VITALS
HEART RATE: 92 BPM | HEIGHT: 67 IN | SYSTOLIC BLOOD PRESSURE: 118 MMHG | WEIGHT: 156.2 LBS | DIASTOLIC BLOOD PRESSURE: 64 MMHG | BODY MASS INDEX: 24.52 KG/M2

## 2024-02-21 DIAGNOSIS — Z36.89 ENCOUNTER FOR OTHER SPECIFIED ANTENATAL SCREENING: ICD-10-CM

## 2024-02-21 DIAGNOSIS — Z36.86 ENCOUNTER FOR ANTENATAL SCREENING FOR CERVICAL LENGTH: ICD-10-CM

## 2024-02-21 DIAGNOSIS — Z36.3 ENCOUNTER FOR ANTENATAL SCREENING FOR MALFORMATIONS: Primary | ICD-10-CM

## 2024-02-21 DIAGNOSIS — O44.40 LOW-LYING PLACENTA: ICD-10-CM

## 2024-02-21 PROCEDURE — 76805 OB US >/= 14 WKS SNGL FETUS: CPT | Performed by: OBSTETRICS & GYNECOLOGY

## 2024-02-21 PROCEDURE — 76817 TRANSVAGINAL US OBSTETRIC: CPT | Performed by: OBSTETRICS & GYNECOLOGY

## 2024-02-22 NOTE — PROGRESS NOTES
"[facility]: Ms. Garcia was seen today for anatomic survey and cervical length screening ultrasound.  See ultrasound report under \"OB Procedures\" tab.   Please don't hesitate to contact our office with any concerns or questions.  -Lorene Horta MD    "

## 2024-04-11 ENCOUNTER — ROUTINE PRENATAL (OUTPATIENT)
Dept: OBGYN CLINIC | Facility: CLINIC | Age: 24
End: 2024-04-11

## 2024-04-11 VITALS — SYSTOLIC BLOOD PRESSURE: 120 MMHG | WEIGHT: 169.8 LBS | BODY MASS INDEX: 26.59 KG/M2 | DIASTOLIC BLOOD PRESSURE: 72 MMHG

## 2024-04-11 DIAGNOSIS — Z34.92 PRENATAL CARE IN SECOND TRIMESTER: Primary | ICD-10-CM

## 2024-04-11 LAB
DME PARACHUTE DELIVERY DATE REQUESTED: NORMAL
DME PARACHUTE ITEM DESCRIPTION: NORMAL
DME PARACHUTE ORDER STATUS: NORMAL
DME PARACHUTE SUPPLIER NAME: NORMAL
DME PARACHUTE SUPPLIER PHONE: NORMAL

## 2024-04-11 NOTE — PROGRESS NOTES
Patient reports good fm, no headache,bleeding, loss of fluid, edema, dom violence.  latoya pnv has nausea vomiting in the morning, cramping off and on, no urine today, still smoking working on decreasing.  -Has  center follow-up low-lying placenta  -Given slip for Glucola CBC reflex anemia panel and total syphilis  -Breast pump ordered today  -Return in 2 weeks or sooner as needed

## 2024-04-17 ENCOUNTER — ULTRASOUND (OUTPATIENT)
Facility: HOSPITAL | Age: 24
End: 2024-04-17
Payer: COMMERCIAL

## 2024-04-17 VITALS — OXYGEN SATURATION: 97 % | SYSTOLIC BLOOD PRESSURE: 120 MMHG | HEART RATE: 76 BPM | DIASTOLIC BLOOD PRESSURE: 76 MMHG

## 2024-04-17 DIAGNOSIS — Z3A.28 28 WEEKS GESTATION OF PREGNANCY: ICD-10-CM

## 2024-04-17 DIAGNOSIS — Z36.89 ENCOUNTER FOR ULTRASOUND TO ASSESS FETAL GROWTH: ICD-10-CM

## 2024-04-17 DIAGNOSIS — Z36.2 ENCOUNTER FOR FOLLOW-UP ULTRASOUND OF FETAL ANATOMY: ICD-10-CM

## 2024-04-17 DIAGNOSIS — O44.40 LOW-LYING PLACENTA: Primary | ICD-10-CM

## 2024-04-17 DIAGNOSIS — O36.5990 FETAL GROWTH RESTRICTION ANTEPARTUM: ICD-10-CM

## 2024-04-17 PROCEDURE — 76820 UMBILICAL ARTERY ECHO: CPT | Performed by: OBSTETRICS & GYNECOLOGY

## 2024-04-17 PROCEDURE — 76816 OB US FOLLOW-UP PER FETUS: CPT | Performed by: OBSTETRICS & GYNECOLOGY

## 2024-04-17 PROCEDURE — 99214 OFFICE O/P EST MOD 30 MIN: CPT | Performed by: OBSTETRICS & GYNECOLOGY

## 2024-04-17 PROCEDURE — 59025 FETAL NON-STRESS TEST: CPT | Performed by: OBSTETRICS & GYNECOLOGY

## 2024-04-17 NOTE — PROGRESS NOTES
"[facility]: Helena Garcia was seen today at 28w5d for fetal growth and followup missed anatomy ultrasound.  See ultrasound report under \"OB Procedures\" tab.  Please don't hesitate to contact our office with any concerns or questions.  -Saida Schneider MD    "

## 2024-04-17 NOTE — LETTER
"2024     VICTORIANO Carrillo  [address]  [address]  [place] [address]    Patient: Helena Garcia   YOB: 2000   Date of Visit: 2024       Dear VICTORIANO Bridges:    Thank you for referring Helena Garcia to me for evaluation. Below are my notes for this consultation.    If you have questions, please do not hesitate to call me. I look forward to following your patient along with you.         Sincerely,    Saida Schneider MD        CC: No Recipients    Saida Schneider MD  2024  2:46 PM  Sign when Signing Visit  [facility]: Helena Garcia was seen today at 28w5d for fetal growth and followup missed anatomy ultrasound.  See ultrasound report under \"OB Procedures\" tab.  Please don't hesitate to contact our office with any concerns or questions.  -Saida Schneider MD    "

## 2024-04-22 ENCOUNTER — TELEPHONE (OUTPATIENT)
Dept: PERINATAL CARE | Facility: CLINIC | Age: 24
End: 2024-04-22

## 2024-04-22 NOTE — TELEPHONE ENCOUNTER
Called [employer] spoke with ED @ 10:50 am # [phone] to set up LYFT transportation for patient's Maternal Fetal Medicine appointment.  Appointment scheduled on  April 23, 2024 at 11:00 AM at [facility].       Spoke with patient and explained LYFT will pick-up patient at 9:50 AM for Maternal Fetal Medicine appointment.   Patient instructed she has 5 minutes maximum to get into car upon arrival. Patient verbalized understanding.     Confirmed phone and address.   [phone]  [address] [place] [id number]

## 2024-04-23 ENCOUNTER — ULTRASOUND (OUTPATIENT)
Dept: PERINATAL CARE | Facility: CLINIC | Age: 24
End: 2024-04-23
Payer: COMMERCIAL

## 2024-04-23 VITALS
WEIGHT: 170.8 LBS | HEIGHT: 67 IN | HEART RATE: 113 BPM | DIASTOLIC BLOOD PRESSURE: 64 MMHG | BODY MASS INDEX: 26.81 KG/M2 | SYSTOLIC BLOOD PRESSURE: 122 MMHG

## 2024-04-23 DIAGNOSIS — O36.5990 FETAL GROWTH RESTRICTION ANTEPARTUM: Primary | ICD-10-CM

## 2024-04-23 DIAGNOSIS — Z3A.29 29 WEEKS GESTATION OF PREGNANCY: ICD-10-CM

## 2024-04-23 PROCEDURE — 76820 UMBILICAL ARTERY ECHO: CPT | Performed by: OBSTETRICS & GYNECOLOGY

## 2024-04-23 PROCEDURE — 76818 FETAL BIOPHYS PROFILE W/NST: CPT | Performed by: OBSTETRICS & GYNECOLOGY

## 2024-04-23 NOTE — PROGRESS NOTES
Repeat Non-Stress Testing:    Patient verbalizes +FM. Pt denies ALL:               Leaking of fluid   Contractions   Vaginal bleeding   Decreased fetal movement    Patient is performing daily kick counts. Patient has no questions or concerns.   NST strip reviewed by Dr. Brock and ordered BPP for today's visit

## 2024-04-23 NOTE — LETTER
April 23, 2024     VICTORIANO Carrillo  [address]  [place] [address]    Patient: Helena Garcia   YOB: 2000   Date of Visit: 4/23/2024       Dear Ms. Bridges:    Thank you for referring Helena Garcia to me for evaluation. Below are my notes for this consultation.    If you have questions, please do not hesitate to call me. I look forward to following your patient along with you.         Sincerely,        Jayesh Brock MD        CC: No Recipients    Jayesh Brock MD  4/23/2024  1:17 PM  Sign when Signing Visit  A fetal ultrasound and NST were completed. See Ob procedures in Epic for an interpretation and recommendations. Do not hesitate to contact us in [facility] if you have questions.    Jayesh Brock MD, MSCE  Maternal Fetal Medicine

## 2024-04-23 NOTE — PROGRESS NOTES
A fetal ultrasound and NST were completed. See Ob procedures in Epic for an interpretation and recommendations. Do not hesitate to contact us in [facility] if you have questions.    Jayesh Brock MD, MSCE  Maternal Fetal Medicine

## 2024-04-25 ENCOUNTER — ROUTINE PRENATAL (OUTPATIENT)
Dept: OBGYN CLINIC | Facility: CLINIC | Age: 24
End: 2024-04-25
Payer: COMMERCIAL

## 2024-04-25 VITALS
WEIGHT: 171 LBS | OXYGEN SATURATION: 99 % | BODY MASS INDEX: 26.78 KG/M2 | DIASTOLIC BLOOD PRESSURE: 70 MMHG | SYSTOLIC BLOOD PRESSURE: 120 MMHG | HEART RATE: 96 BPM

## 2024-04-25 DIAGNOSIS — Z23 ENCOUNTER FOR IMMUNIZATION: ICD-10-CM

## 2024-04-25 DIAGNOSIS — Z34.83 PRENATAL CARE, SUBSEQUENT PREGNANCY, THIRD TRIMESTER: Primary | ICD-10-CM

## 2024-04-25 PROCEDURE — 90471 IMMUNIZATION ADMIN: CPT | Performed by: NURSE PRACTITIONER

## 2024-04-25 PROCEDURE — PNV: Performed by: NURSE PRACTITIONER

## 2024-04-25 PROCEDURE — 90715 TDAP VACCINE 7 YRS/> IM: CPT | Performed by: NURSE PRACTITIONER

## 2024-04-25 PROCEDURE — 59025 FETAL NON-STRESS TEST: CPT | Performed by: NURSE PRACTITIONER

## 2024-04-25 NOTE — PROGRESS NOTES
Helena Garcia is a 23 y.o.  at 29w6d. Reports +FM  She is doing well. Denies LOF/Bleeding/Cramping. Denies n/v/Ha, edema. Denies tobacco, drugs or ETOH use. Denies DV.     Visit Vitals  /70   Pulse 96   Wt 77.6 kg (171 lb)   LMP 2023   SpO2 99%   BMI 26.78 kg/m²   OB Status Pregnant   Smoking Status Never   BSA 1.89 m²     Pre-Wilfrido Vitals      Flowsheet Row Most Recent Value   Prenatal Assessment    Fetal Heart Rate 150   Movement Present   Prenatal Vitals    Blood Pressure 120/70   Weight - Scale 77.6 kg (171 lb)   Urine Albumin/Glucose    Dilation/Effacement/Station    Vaginal Drainage    Edema             Urine neg/neg    NST reassuring and documented under rooming.     Diagnoses and all orders for this visit:    Prenatal care, subsequent pregnancy, third trimester  -     Breast pump    Encounter for immunization  -     Tdap Vaccine greater than or equal to 6yo      Breast Pump   TDAP - given   Contraception - Depo-Provera  Peds - Dr. Stout    Patient reminded to get her 28 week labs drawn. States she is going Monday for her labs      RTO 2 weeks in  for PNV or sooner if needed.

## 2024-05-02 ENCOUNTER — TELEPHONE (OUTPATIENT)
Facility: HOSPITAL | Age: 24
End: 2024-05-02

## 2024-05-02 ENCOUNTER — HOSPITAL ENCOUNTER (OUTPATIENT)
Facility: HOSPITAL | Age: 24
Discharge: HOME | End: 2024-05-02
Attending: OBSTETRICS & GYNECOLOGY | Admitting: OBSTETRICS & GYNECOLOGY
Payer: COMMERCIAL

## 2024-05-02 VITALS
TEMPERATURE: 99 F | SYSTOLIC BLOOD PRESSURE: 123 MMHG | DIASTOLIC BLOOD PRESSURE: 89 MMHG | HEART RATE: 77 BPM | RESPIRATION RATE: 18 BRPM

## 2024-05-02 DIAGNOSIS — O99.611 GASTROESOPHAGEAL REFLUX IN PREGNANCY IN FIRST TRIMESTER: ICD-10-CM

## 2024-05-02 DIAGNOSIS — N39.0 UTI (URINARY TRACT INFECTION): ICD-10-CM

## 2024-05-02 DIAGNOSIS — K21.9 GERD (GASTROESOPHAGEAL REFLUX DISEASE): ICD-10-CM

## 2024-05-02 DIAGNOSIS — R10.9 ABDOMINAL PAIN DURING PREGNANCY, ANTEPARTUM: Primary | ICD-10-CM

## 2024-05-02 DIAGNOSIS — B96.89 BACTERIAL VAGINOSIS IN PREGNANCY: ICD-10-CM

## 2024-05-02 DIAGNOSIS — O26.899 ABDOMINAL PAIN DURING PREGNANCY, ANTEPARTUM: Primary | ICD-10-CM

## 2024-05-02 DIAGNOSIS — O23.599 BACTERIAL VAGINOSIS IN PREGNANCY: ICD-10-CM

## 2024-05-02 DIAGNOSIS — K21.9 GASTROESOPHAGEAL REFLUX IN PREGNANCY IN FIRST TRIMESTER: ICD-10-CM

## 2024-05-02 LAB
ALBUMIN SERPL BCP-MCNC: 3.3 G/DL (ref 3.5–5)
ALP SERPL-CCNC: 110 U/L (ref 34–104)
ALT SERPL W P-5'-P-CCNC: 7 U/L (ref 7–52)
AMYLASE SERPL-CCNC: 58 IU/L (ref 29–103)
ANION GAP SERPL CALCULATED.3IONS-SCNC: 7 MMOL/L (ref 4–13)
AST SERPL W P-5'-P-CCNC: 11 U/L (ref 13–39)
BACTERIA UR QL AUTO: ABNORMAL /HPF
BILIRUB SERPL-MCNC: 0.3 MG/DL (ref 0.2–1)
BILIRUB UR QL STRIP: NEGATIVE
BUN SERPL-MCNC: 9 MG/DL (ref 5–25)
CALCIUM ALBUM COR SERPL-MCNC: 8.9 MG/DL (ref 8.3–10.1)
CALCIUM SERPL-MCNC: 8.3 MG/DL (ref 8.4–10.2)
CHLORIDE SERPL-SCNC: 108 MMOL/L (ref 96–108)
CLARITY UR: CLEAR
CO2 SERPL-SCNC: 19 MMOL/L (ref 21–32)
COLOR UR: YELLOW
CREAT SERPL-MCNC: 0.63 MG/DL (ref 0.6–1.3)
CREAT UR-MCNC: 159.7 MG/DL
ERYTHROCYTE [DISTWIDTH] IN BLOOD BY AUTOMATED COUNT: 14.2 % (ref 11.6–15.1)
GFR SERPL CREATININE-BSD FRML MDRD: 125 ML/MIN/1.73SQ M
GLUCOSE SERPL-MCNC: 74 MG/DL (ref 65–140)
GLUCOSE UR STRIP-MCNC: NEGATIVE MG/DL
HCT VFR BLD AUTO: 38.6 % (ref 34.8–46.1)
HGB BLD-MCNC: 12.7 G/DL (ref 11.5–15.4)
HGB UR QL STRIP.AUTO: ABNORMAL
KETONES UR STRIP-MCNC: NEGATIVE MG/DL
LEUKOCYTE ESTERASE UR QL STRIP: ABNORMAL
LIPASE SERPL-CCNC: 21 U/L (ref 11–82)
MCH RBC QN AUTO: 29 PG (ref 26.8–34.3)
MCHC RBC AUTO-ENTMCNC: 32.9 G/DL (ref 31.4–37.4)
MCV RBC AUTO: 88 FL (ref 82–98)
NITRITE UR QL STRIP: NEGATIVE
NON-SQ EPI CELLS URNS QL MICRO: ABNORMAL /HPF
PH UR STRIP.AUTO: 6.5 [PH] (ref 4.5–8)
PLATELET # BLD AUTO: 278 THOUSANDS/UL (ref 149–390)
PMV BLD AUTO: 9.8 FL (ref 8.9–12.7)
POTASSIUM SERPL-SCNC: 4.1 MMOL/L (ref 3.5–5.3)
PROT SERPL-MCNC: 6.4 G/DL (ref 6.4–8.4)
PROT UR STRIP-MCNC: NEGATIVE MG/DL
PROT UR-MCNC: 21 MG/DL
PROT/CREAT UR: 0.13 MG/G{CREAT} (ref 0–0.1)
RBC # BLD AUTO: 4.38 MILLION/UL (ref 3.81–5.12)
RBC #/AREA URNS AUTO: ABNORMAL /HPF
SODIUM SERPL-SCNC: 134 MMOL/L (ref 135–147)
SP GR UR STRIP.AUTO: 1.02 (ref 1–1.03)
UROBILINOGEN UR QL STRIP.AUTO: 1 E.U./DL
WBC # BLD AUTO: 11.41 THOUSAND/UL (ref 4.31–10.16)
WBC #/AREA URNS AUTO: ABNORMAL /HPF

## 2024-05-02 PROCEDURE — 82150 ASSAY OF AMYLASE: CPT

## 2024-05-02 PROCEDURE — 83690 ASSAY OF LIPASE: CPT

## 2024-05-02 PROCEDURE — 82570 ASSAY OF URINE CREATININE: CPT

## 2024-05-02 PROCEDURE — 80053 COMPREHEN METABOLIC PANEL: CPT

## 2024-05-02 PROCEDURE — 81001 URINALYSIS AUTO W/SCOPE: CPT

## 2024-05-02 PROCEDURE — 87086 URINE CULTURE/COLONY COUNT: CPT

## 2024-05-02 PROCEDURE — 85027 COMPLETE CBC AUTOMATED: CPT

## 2024-05-02 PROCEDURE — 59025 FETAL NON-STRESS TEST: CPT | Performed by: OBSTETRICS & GYNECOLOGY

## 2024-05-02 PROCEDURE — 84156 ASSAY OF PROTEIN URINE: CPT

## 2024-05-02 PROCEDURE — 99214 OFFICE O/P EST MOD 30 MIN: CPT

## 2024-05-02 RX ORDER — METRONIDAZOLE 500 MG/1
500 TABLET ORAL EVERY 8 HOURS SCHEDULED
Qty: 21 TABLET | Refills: 0 | Status: SHIPPED | OUTPATIENT
Start: 2024-05-02 | End: 2024-05-08

## 2024-05-02 RX ORDER — CYCLOBENZAPRINE HCL 10 MG
10 TABLET ORAL 3 TIMES DAILY PRN
Qty: 21 TABLET | Refills: 0 | Status: SHIPPED | OUTPATIENT
Start: 2024-05-02 | End: 2024-05-09

## 2024-05-02 RX ORDER — ACETAMINOPHEN 500 MG
1000 TABLET ORAL EVERY 6 HOURS PRN
Qty: 60 TABLET | Refills: 0 | Status: SHIPPED | OUTPATIENT
Start: 2024-05-02 | End: 2024-05-08

## 2024-05-02 RX ORDER — CEPHALEXIN 500 MG/1
500 CAPSULE ORAL EVERY 6 HOURS SCHEDULED
Qty: 28 CAPSULE | Refills: 0 | Status: SHIPPED | OUTPATIENT
Start: 2024-05-02 | End: 2024-05-08

## 2024-05-02 RX ORDER — FAMOTIDINE 20 MG/1
20 TABLET, FILM COATED ORAL DAILY
Qty: 30 TABLET | Refills: 0 | Status: SHIPPED | OUTPATIENT
Start: 2024-05-02

## 2024-05-02 NOTE — TELEPHONE ENCOUNTER
Called [employer] spoke with Kylah @ # [phone] to set up LYFT transportation for patient's Maternal Fetal Medicine appointment.  Appointment scheduled on  May 3, 2024 at 1:30 PM at our [place] office.       Spoke with patient and explained LYFT will pick-up patient at 12:50 PM for Maternal Fetal Medicine appointment.   Patient instructed she has 5 minutes maximum to get into car upon arrival. Patient verbalized understanding.     Confirmed phone and address.   [phone]  [address] [place], [place] [id number]

## 2024-05-02 NOTE — PROGRESS NOTES
L&D Triage Note - OB/GYN  Helena Garcia 23 y.o. female MRN: [id number]  Unit/Bed#:  TRIAGE 2 Encounter: [id number]        Patient is seen by [facility]     ASSESSMENT/PLAN  Helena Garcia is a 23 y.o.  at 30w6d complaining of worsening periodic stabbing abdominal pain and yellow vaginal discharge that started 6 days ago.      1) Rule out  labor  - SVE  - Speculum exam: no abnormal discharge or coloration. Unremarkable.  2) rule out preeclampsia with RUQ and Epigastric pain  -CBC, CMP, Protein creatinine ratio, UA, amylase, Lipase  -UA shows 30-50 WBC and occasional Bacteria: cephalexin 4 times a day for 7 days.  3) Bacterial vaginosis  -metronidazole for 7 days  4) GERD  -Pepcid daily as needed       KOH/Wet [place]:     Infection:   - present clue cells    - none hyphae   - none trichomonads present    Membrane status   - none ferning   - none nitrazene   - none pooling     SVE:  Cervical Dilation: 1  Cervical Effacement: 50  Fetal Station: -3  Method: Manual  OB Examiner: Kevon    FHT:  Baseline Rate (FHR): 140 bpm  Variability: Moderate  Accelerations: 10 x 10 (<32 weeks), With fetal movement  Decelerations: None    TOCO: No contractions       IMAGING:       TVUS   Cervical length         - 2.91 cm         - 3.15 cm         - 3.03 cm   Presentation: cephalic            Discharge instructions  - Patient instructed to call if experiencing worsening contractions, vaginal bleeding, loss of fluid or decreased fetal movement.  - Will follow up with OBGYN in office    D/w Dr. Lockwood  ______________    SUBJECTIVE    MORENA: Estimated Date of Delivery: 24    HPI:  23 y.o.  30w6d presents with complaint of worsening periodic stabbing abdominal pain and yellow vaginal discharge that started 6 days ago.The pain is only helped by smoking marijuana. The patient smokes 2-3 blunts daily. The patient states the pain is random and not correlated with food. Patient is complaining of mild  nausea. Patient states the abdominal pain feels different than her contractions from her previous pregnancy. The patient denies trauma, chest pain, SOB, vomiting, diarrhea, constipation, dysuria, polyuria, hematuria, rash or any other complaints at this time.     Contractions: denies   Leakage of fluid: denies  Vaginal Bleeding: denies  Fetal movement: present    Her obstetrical history is significant for Severe FGR w/ elevated umbilical artery doppler in this pregnancy. Previous pregnancy FGR that was born via vaginal delivery at 39 weeks.    ROS:  Constitutional: Negative  Respiratory: Negative  Cardiovascular: Negative    Gastrointestinal: Negative    Physical Exam  GEN: Well appearing, no apparent distress   ABD: Gravid, soft, with tenderness in RUQ and epigastric region. No costovertebral angle tenderness bilaterally. No guarding.  SVE: Cervical Dilation: 1  Cervical Effacement: 50  Fetal Station: -3  Method: Manual  OB Examiner: Kevon    OBJECTIVE:  /89   Pulse 77   Temp 99 °F (37.2 °C) (Oral)   Resp 18   LMP 09/29/2023   There is no height or weight on file to calculate BMI.  Labs:   Recent Results (from the past 24 hour(s))   Urine Macroscopic, POC    Collection Time: 05/02/24 12:17 PM   Result Value Ref Range    Color, UA Yellow     Clarity, UA Clear     pH, UA 6.5 4.5 - 8.0    Leukocytes, UA Small (A) Negative    Nitrite, UA Negative Negative    Protein, UA Negative Negative mg/dl    Glucose, UA Negative Negative mg/dl    Ketones, UA Negative Negative mg/dl    Urobilinogen, UA 1.0 0.2, 1.0 E.U./dl E.U./dl    Bilirubin, UA Negative Negative    Occult Blood, UA Trace (A) Negative    Specific Gravity, UA 1.025 1.003 - 1.030   Urine Microscopic    Collection Time: 05/02/24 12:17 PM   Result Value Ref Range    RBC, UA 1-2 None Seen, 1-2 /hpf    WBC, UA 30-50 (A) None Seen, 1-2 /hpf    Epithelial Cells Occasional None Seen, Occasional /hpf    Bacteria, UA Occasional None Seen, Occasional /hpf  "  Protein / creatinine ratio, urine    Collection Time: 05/02/24 12:17 PM   Result Value Ref Range    Creatinine, Ur 159.7 Reference range not established. mg/dL    Protein Urine Random 21 Reference range not established. mg/dL    Prot/Creat Ratio, Ur 0.13 (H) 0.00 - 0.10   CBC and Platelet    Collection Time: 05/02/24 12:34 PM   Result Value Ref Range    WBC 11.41 (H) 4.31 - 10.16 Thousand/uL    RBC 4.38 3.81 - 5.12 Million/uL    Hemoglobin 12.7 11.5 - 15.4 g/dL    Hematocrit 38.6 34.8 - 46.1 %    MCV 88 82 - 98 fL    MCH 29.0 26.8 - 34.3 pg    MCHC 32.9 31.4 - 37.4 g/dL    RDW 14.2 11.6 - 15.1 %    Platelets 278 149 - 390 Thousands/uL    MPV 9.8 8.9 - 12.7 fL   Comprehensive metabolic panel    Collection Time: 05/02/24 12:34 PM   Result Value Ref Range    Sodium 134 (L) 135 - 147 mmol/L    Potassium 4.1 3.5 - 5.3 mmol/L    Chloride 108 96 - 108 mmol/L    CO2 19 (L) 21 - 32 mmol/L    ANION GAP 7 4 - 13 mmol/L    BUN 9 5 - 25 mg/dL    Creatinine 0.63 0.60 - 1.30 mg/dL    Glucose 74 65 - 140 mg/dL    Calcium 8.3 (L) 8.4 - 10.2 mg/dL    Corrected Calcium 8.9 8.3 - 10.1 mg/dL    AST 11 (L) 13 - 39 U/L    ALT 7 7 - 52 U/L    Alkaline Phosphatase 110 (H) 34 - 104 U/L    Total Protein 6.4 6.4 - 8.4 g/dL    Albumin 3.3 (L) 3.5 - 5.0 g/dL    Total Bilirubin 0.30 0.20 - 1.00 mg/dL    eGFR 125 ml/min/1.73sq m   Amylase    Collection Time: 05/02/24 12:34 PM   Result Value Ref Range    Amylase 58 29 - 103 IU/L   Lipase    Collection Time: 05/02/24 12:34 PM   Result Value Ref Range    Lipase 21 11 - 82 u/L         Escobar Khalil MD  OB/GYN PGY-1  5/2/2024  1:55 PM      Portions of the record may have been created with voice recognition software.  Occasional wrong word or \"sound a like\" substitutions may have occurred due to the inherent limitations of voice recognition software.  Read the chart carefully and recognize, using context, where substitutions have occurred   "

## 2024-05-03 ENCOUNTER — DOCUMENTATION (OUTPATIENT)
Facility: HOSPITAL | Age: 24
End: 2024-05-03

## 2024-05-03 ENCOUNTER — ULTRASOUND (OUTPATIENT)
Facility: HOSPITAL | Age: 24
End: 2024-05-03
Payer: COMMERCIAL

## 2024-05-03 VITALS
BODY MASS INDEX: 27.04 KG/M2 | WEIGHT: 172.3 LBS | DIASTOLIC BLOOD PRESSURE: 64 MMHG | HEART RATE: 94 BPM | SYSTOLIC BLOOD PRESSURE: 116 MMHG | OXYGEN SATURATION: 98 % | HEIGHT: 67 IN

## 2024-05-03 DIAGNOSIS — O36.5990 FETAL GROWTH RESTRICTION ANTEPARTUM: Primary | ICD-10-CM

## 2024-05-03 DIAGNOSIS — Z03.72 SUSPECTED PROBLEM WITH PLACENTA NOT FOUND: ICD-10-CM

## 2024-05-03 DIAGNOSIS — Z3A.31 31 WEEKS GESTATION OF PREGNANCY: ICD-10-CM

## 2024-05-03 PROBLEM — O44.40 LOW-LYING PLACENTA: Status: RESOLVED | Noted: 2024-02-21 | Resolved: 2024-05-03

## 2024-05-03 LAB — BACTERIA UR CULT: NORMAL

## 2024-05-03 PROCEDURE — 76817 TRANSVAGINAL US OBSTETRIC: CPT | Performed by: OBSTETRICS & GYNECOLOGY

## 2024-05-03 PROCEDURE — 76816 OB US FOLLOW-UP PER FETUS: CPT | Performed by: OBSTETRICS & GYNECOLOGY

## 2024-05-03 PROCEDURE — 59025 FETAL NON-STRESS TEST: CPT | Performed by: OBSTETRICS & GYNECOLOGY

## 2024-05-03 NOTE — PROGRESS NOTES
Non-stress Test (NST)  24  Helena Garcia  2000  Estimated Date of Delivery: 24    Reason for testing: FGR 3-10%, abnormal dopplers  Repeat Non-Stress Test at 31w0d.    Vitals:  Pre- Vitals      Flowsheet Row Most Recent Value   Blood Pressure 116/64   Weight - Scale 78.2 kg (172 lb 4.8 oz)     Patient verbalizes fetal movement.   Patient is performing daily kick counts.   Patient denies ALL:               Leaking of fluid   Contractions   Vaginal bleeding   Decreased fetal movement    Patient verbalized understanding. Patient has no questions or concerns.   NST strip reviewed by Dr. Harris.     Esther Kinney RN

## 2024-05-03 NOTE — PROGRESS NOTES
In-basket message sent to [facility] OB Clerical pool on 5/3 regarding scheduling weekly NST/KIRAN appt's w/their office beginning week of 5/20. Spoke to PT at check out on 5/3, and she is agreeable to completing appt's w/OB rather than MFM due to transportation. PT verbalized understanding of next 2 weeks being w/MFM, and following weeks will be w/OB.

## 2024-05-03 NOTE — LETTER
May 3, 2024     VICTORIANO Carrillo  [address]  [address]  [place] [address]    Patient: Helena Garcia   YOB: 2000   Date of Visit: 5/3/2024       Dear Dr. Bridges:    Thank you for referring Helena Garcia to me for evaluation. Below are my notes for this consultation.    If you have questions, please do not hesitate to call me. I look forward to following your patient along with you.         Sincerely,        Sharyn Harris MD        CC: No Recipients    Sharyn Harris MD  5/3/2024  5:31 PM  Sign when Signing Visit  Helena Garcia has no complaints today.  She reports regular fetal movements and does not report any problems.  She is here today at 31w0d for an ultrasound for fetal fetal growth and placental position.    Problem list:  Her prior ultrasound showed a fetus in the 5th percentile with mildly elevated umbilical Dopplers.  Her last baby weighed 5 pounds 7 ounces at 38 weeks and 5 days.  Low lying placenta    Ultrasound findings:  The ultrasound today shows a fetus that appears symmetric in the 3rd percentile with mildly elevated umbilical Dopplers and normal KIRAN.  The placenta is no longer low-lying.    NST is reactive    Pregnancy ultrasound has limitations and is unable to detect all forms of fetal congenital abnormalities.  The inaccuracy in the EFW can be off by 1 lb either way in the third trimester.    Follow up recommended:   Recommend continuing twice weekly NST and weekly KIRAN and Dopplers.  Recommend a follow-up ultrasound for growth in 2 weeks.  Recommend daily fetal kick counts.    Pre visit time reviewing her records   5 minutes  Face to face time 10 minutes  Post visit time on documentation of note, updating her problem list, adding orders and prescriptions 5 minutes.  Procedures that were completed today were charged separately.   The level of decision making was low level complexity.    Sharyn Harris MD

## 2024-05-03 NOTE — PROGRESS NOTES
Helena Garcia has no complaints today.  She reports regular fetal movements and does not report any problems.  She is here today at 31w0d for an ultrasound for fetal fetal growth and placental position.    Problem list:  Her prior ultrasound showed a fetus in the 5th percentile with mildly elevated umbilical Dopplers.  Her last baby weighed 5 pounds 7 ounces at 38 weeks and 5 days.  Low lying placenta    Ultrasound findings:  The ultrasound today shows a fetus that appears symmetric in the 3rd percentile with mildly elevated umbilical Dopplers and normal KIRAN.  The placenta is no longer low-lying.    NST is reactive    Pregnancy ultrasound has limitations and is unable to detect all forms of fetal congenital abnormalities.  The inaccuracy in the EFW can be off by 1 lb either way in the third trimester.    Follow up recommended:   Recommend continuing twice weekly NST and weekly KIRAN and Dopplers.  Recommend a follow-up ultrasound for growth in 2 weeks.  Recommend daily fetal kick counts.    Pre visit time reviewing her records   5 minutes  Face to face time 10 minutes  Post visit time on documentation of note, updating her problem list, adding orders and prescriptions 5 minutes.  Procedures that were completed today were charged separately.   The level of decision making was low level complexity.    Sharyn Harris MD

## 2024-05-06 ENCOUNTER — HOSPITAL ENCOUNTER (INPATIENT)
Facility: HOSPITAL | Age: 24
LOS: 1 days | Discharge: HOME | DRG: 560 | End: 2024-05-08
Attending: OBSTETRICS & GYNECOLOGY | Admitting: OBSTETRICS & GYNECOLOGY
Payer: COMMERCIAL

## 2024-05-06 DIAGNOSIS — O60.00 PRETERM LABOR: ICD-10-CM

## 2024-05-06 DIAGNOSIS — Z3A.31 31 WEEKS GESTATION OF PREGNANCY: Primary | ICD-10-CM

## 2024-05-06 DIAGNOSIS — B96.89 BACTERIAL VAGINOSIS: ICD-10-CM

## 2024-05-06 DIAGNOSIS — N76.0 BACTERIAL VAGINOSIS: ICD-10-CM

## 2024-05-07 ENCOUNTER — ANESTHESIA EVENT (OUTPATIENT)
Dept: ANESTHESIOLOGY | Facility: HOSPITAL | Age: 24
DRG: 560 | End: 2024-05-07
Payer: COMMERCIAL

## 2024-05-07 ENCOUNTER — ANESTHESIA EVENT (INPATIENT)
Dept: ANESTHESIOLOGY | Facility: HOSPITAL | Age: 24
DRG: 560 | End: 2024-05-07
Payer: COMMERCIAL

## 2024-05-07 ENCOUNTER — ANESTHESIA (OUTPATIENT)
Dept: ANESTHESIOLOGY | Facility: HOSPITAL | Age: 24
DRG: 560 | End: 2024-05-07
Payer: COMMERCIAL

## 2024-05-07 ENCOUNTER — ANESTHESIA (INPATIENT)
Dept: ANESTHESIOLOGY | Facility: HOSPITAL | Age: 24
DRG: 560 | End: 2024-05-07
Payer: COMMERCIAL

## 2024-05-07 PROBLEM — B96.89 BACTERIAL VAGINOSIS: Status: ACTIVE | Noted: 2024-05-07

## 2024-05-07 PROBLEM — O26.899 ABDOMINAL PAIN DURING PREGNANCY: Status: RESOLVED | Noted: 2023-03-19 | Resolved: 2024-05-07

## 2024-05-07 PROBLEM — R10.9 ABDOMINAL PAIN DURING PREGNANCY: Status: RESOLVED | Noted: 2023-03-19 | Resolved: 2024-05-07

## 2024-05-07 PROBLEM — Z3A.31 31 WEEKS GESTATION OF PREGNANCY: Status: ACTIVE | Noted: 2024-04-17

## 2024-05-07 PROBLEM — O60.00 PRETERM LABOR: Status: ACTIVE | Noted: 2024-05-07

## 2024-05-07 PROBLEM — O60.00 PRETERM LABOR: Chronic | Status: ACTIVE | Noted: 2024-05-07

## 2024-05-07 PROBLEM — N76.0 BACTERIAL VAGINOSIS: Status: ACTIVE | Noted: 2024-05-07

## 2024-05-07 LAB
ABO GROUP BLD: NORMAL
APTT PPP: 24 SECONDS (ref 23–37)
BASE EXCESS BLDCOA CALC-SCNC: -9.3 MMOL/L (ref 3–11)
BASE EXCESS BLDCOV CALC-SCNC: -9 MMOL/L (ref 1–9)
BLD GP AB SCN SERPL QL: NEGATIVE
DME PARACHUTE DELIVERY DATE ACTUAL: NORMAL
DME PARACHUTE DELIVERY DATE REQUESTED: NORMAL
DME PARACHUTE ITEM DESCRIPTION: NORMAL
DME PARACHUTE ORDER STATUS: NORMAL
DME PARACHUTE SUPPLIER NAME: NORMAL
DME PARACHUTE SUPPLIER PHONE: NORMAL
ERYTHROCYTE [DISTWIDTH] IN BLOOD BY AUTOMATED COUNT: 14.1 % (ref 11.6–15.1)
FIBRINOGEN PPP-MCNC: 583 MG/DL (ref 207–520)
GLUCOSE 1H P 50 G GLC PO SERPL-MCNC: 144 MG/DL (ref 70–139)
HCO3 BLDCOA-SCNC: 19.9 MMOL/L (ref 17.3–27.3)
HCO3 BLDCOV-SCNC: 19.5 MMOL/L (ref 12.2–28.6)
HCT VFR BLD AUTO: 37.8 % (ref 34.8–46.1)
HGB BLD-MCNC: 12.3 G/DL (ref 11.5–15.4)
HOLD SPECIMEN: NORMAL
INR PPP: 0.88 (ref 0.84–1.19)
MCH RBC QN AUTO: 28.3 PG (ref 26.8–34.3)
MCHC RBC AUTO-ENTMCNC: 32.5 G/DL (ref 31.4–37.4)
MCV RBC AUTO: 87 FL (ref 82–98)
O2 CT VFR BLDCOA CALC: 11.7 ML/DL
OXYHGB MFR BLDCOA: 46.6 %
OXYHGB MFR BLDCOV: 43.3 %
PCO2 BLDCOA: 55.2 MM[HG] (ref 30–60)
PCO2 BLDCOV: 51.5 MM HG (ref 27–43)
PH BLDCOA: 7.17 [PH] (ref 7.23–7.43)
PH BLDCOV: 7.2 [PH] (ref 7.19–7.49)
PLATELET # BLD AUTO: 305 THOUSANDS/UL (ref 149–390)
PMV BLD AUTO: 10 FL (ref 8.9–12.7)
PO2 BLDCOA: 23.7 MM HG (ref 5–25)
PO2 BLDCOV: 21.9 MM HG (ref 15–45)
PROTHROMBIN TIME: 12.5 SECONDS (ref 11.6–14.5)
RBC # BLD AUTO: 4.34 MILLION/UL (ref 3.81–5.12)
RH BLD: POSITIVE
SAO2 % BLDCOV: 10.9 ML/DL
SPECIMEN EXPIRATION DATE: NORMAL
TREPONEMA PALLIDUM IGG+IGM AB [PRESENCE] IN SERUM OR PLASMA BY IMMUNOASSAY: NORMAL
WBC # BLD AUTO: 15.38 THOUSAND/UL (ref 4.31–10.16)

## 2024-05-07 PROCEDURE — 85610 PROTHROMBIN TIME: CPT

## 2024-05-07 PROCEDURE — 85027 COMPLETE CBC AUTOMATED: CPT

## 2024-05-07 PROCEDURE — 4A0HXFZ MEASUREMENT OF PRODUCTS OF CONCEPTION, CARDIAC RHYTHM, EXTERNAL APPROACH: ICD-10-PCS | Performed by: OBSTETRICS & GYNECOLOGY

## 2024-05-07 PROCEDURE — NC001 PR NO CHARGE: Performed by: OBSTETRICS & GYNECOLOGY

## 2024-05-07 PROCEDURE — 99213 OFFICE O/P EST LOW 20 MIN: CPT

## 2024-05-07 PROCEDURE — 82805 BLOOD GASES W/O2 SATURATION: CPT

## 2024-05-07 PROCEDURE — 87591 N.GONORRHOEAE DNA AMP PROB: CPT

## 2024-05-07 PROCEDURE — 85384 FIBRINOGEN ACTIVITY: CPT

## 2024-05-07 PROCEDURE — 86901 BLOOD TYPING SEROLOGIC RH(D): CPT | Performed by: OBSTETRICS & GYNECOLOGY

## 2024-05-07 PROCEDURE — 87491 CHLMYD TRACH DNA AMP PROBE: CPT

## 2024-05-07 PROCEDURE — 85730 THROMBOPLASTIN TIME PARTIAL: CPT

## 2024-05-07 PROCEDURE — 86780 TREPONEMA PALLIDUM: CPT

## 2024-05-07 PROCEDURE — 86900 BLOOD TYPING SEROLOGIC ABO: CPT | Performed by: OBSTETRICS & GYNECOLOGY

## 2024-05-07 PROCEDURE — 59409 OBSTETRICAL CARE: CPT | Performed by: OBSTETRICS & GYNECOLOGY

## 2024-05-07 PROCEDURE — 88307 TISSUE EXAM BY PATHOLOGIST: CPT | Performed by: STUDENT IN AN ORGANIZED HEALTH CARE EDUCATION/TRAINING PROGRAM

## 2024-05-07 PROCEDURE — 86850 RBC ANTIBODY SCREEN: CPT | Performed by: OBSTETRICS & GYNECOLOGY

## 2024-05-07 PROCEDURE — 99254 IP/OBS CNSLTJ NEW/EST MOD 60: CPT | Performed by: OBSTETRICS & GYNECOLOGY

## 2024-05-07 RX ORDER — ACETAMINOPHEN 325 MG/1
975 TABLET ORAL EVERY 8 HOURS PRN
Status: DISCONTINUED | OUTPATIENT
Start: 2024-05-07 | End: 2024-05-07

## 2024-05-07 RX ORDER — ACETAMINOPHEN 325 MG/1
650 TABLET ORAL EVERY 6 HOURS
Status: DISCONTINUED | OUTPATIENT
Start: 2024-05-07 | End: 2024-05-08 | Stop reason: HOSPADM

## 2024-05-07 RX ORDER — SIMETHICONE 80 MG
80 TABLET,CHEWABLE ORAL 4 TIMES DAILY PRN
Status: DISCONTINUED | OUTPATIENT
Start: 2024-05-07 | End: 2024-05-08 | Stop reason: HOSPADM

## 2024-05-07 RX ORDER — BENZOCAINE/MENTHOL 6 MG-10 MG
1 LOZENGE MUCOUS MEMBRANE DAILY PRN
Status: DISCONTINUED | OUTPATIENT
Start: 2024-05-07 | End: 2024-05-08 | Stop reason: HOSPADM

## 2024-05-07 RX ORDER — NIFEDIPINE 10 MG/1
10 CAPSULE ORAL EVERY 6 HOURS
Status: DISCONTINUED | OUTPATIENT
Start: 2024-05-07 | End: 2024-05-07

## 2024-05-07 RX ORDER — MAGNESIUM SULFATE HEPTAHYDRATE 40 MG/ML
2 INJECTION, SOLUTION INTRAVENOUS ONCE
Status: DISCONTINUED | OUTPATIENT
Start: 2024-05-07 | End: 2024-05-07

## 2024-05-07 RX ORDER — LIDOCAINE HYDROCHLORIDE AND EPINEPHRINE 15; 5 MG/ML; UG/ML
INJECTION, SOLUTION EPIDURAL AS NEEDED
Status: DISCONTINUED | OUTPATIENT
Start: 2024-05-07 | End: 2024-05-08 | Stop reason: HOSPADM

## 2024-05-07 RX ORDER — CALCIUM GLUCONATE 94 MG/ML
1 INJECTION, SOLUTION INTRAVENOUS ONCE AS NEEDED
Status: DISCONTINUED | OUTPATIENT
Start: 2024-05-07 | End: 2024-05-07

## 2024-05-07 RX ORDER — POLYETHYLENE GLYCOL 3350 17 G/17G
17 POWDER, FOR SOLUTION ORAL DAILY PRN
Status: DISCONTINUED | OUTPATIENT
Start: 2024-05-07 | End: 2024-05-08 | Stop reason: HOSPADM

## 2024-05-07 RX ORDER — SODIUM CHLORIDE, SODIUM LACTATE, POTASSIUM CHLORIDE, CALCIUM CHLORIDE 600; 310; 30; 20 MG/100ML; MG/100ML; MG/100ML; MG/100ML
125 INJECTION, SOLUTION INTRAVENOUS CONTINUOUS
Status: DISCONTINUED | OUTPATIENT
Start: 2024-05-07 | End: 2024-05-07

## 2024-05-07 RX ORDER — NIFEDIPINE 10 MG/1
30 CAPSULE ORAL ONCE
Status: DISCONTINUED | OUTPATIENT
Start: 2024-05-07 | End: 2024-05-07

## 2024-05-07 RX ORDER — IBUPROFEN 600 MG/1
600 TABLET ORAL EVERY 6 HOURS
Status: DISCONTINUED | OUTPATIENT
Start: 2024-05-07 | End: 2024-05-08 | Stop reason: HOSPADM

## 2024-05-07 RX ORDER — OXYTOCIN/RINGER'S LACTATE 30/500 ML
250 PLASTIC BAG, INJECTION (ML) INTRAVENOUS ONCE
Status: COMPLETED | OUTPATIENT
Start: 2024-05-07 | End: 2024-05-07

## 2024-05-07 RX ORDER — LIDOCAINE HYDROCHLORIDE 10 MG/ML
INJECTION, SOLUTION EPIDURAL; INFILTRATION; INTRACAUDAL; PERINEURAL AS NEEDED
Status: DISCONTINUED | OUTPATIENT
Start: 2024-05-07 | End: 2024-05-08 | Stop reason: HOSPADM

## 2024-05-07 RX ORDER — ONDANSETRON 2 MG/ML
4 INJECTION INTRAMUSCULAR; INTRAVENOUS EVERY 8 HOURS PRN
Status: DISCONTINUED | OUTPATIENT
Start: 2024-05-07 | End: 2024-05-08 | Stop reason: HOSPADM

## 2024-05-07 RX ORDER — POLYETHYLENE GLYCOL 3350 17 G/17G
17 POWDER, FOR SOLUTION ORAL DAILY PRN
Status: CANCELLED
Start: 2024-05-07

## 2024-05-07 RX ORDER — MAGNESIUM SULFATE HEPTAHYDRATE 40 MG/ML
4 INJECTION, SOLUTION INTRAVENOUS ONCE
Status: COMPLETED | OUTPATIENT
Start: 2024-05-07 | End: 2024-05-07

## 2024-05-07 RX ORDER — DIPHENHYDRAMINE HCL 25 MG
25 TABLET ORAL EVERY 6 HOURS PRN
Status: DISCONTINUED | OUTPATIENT
Start: 2024-05-07 | End: 2024-05-08 | Stop reason: HOSPADM

## 2024-05-07 RX ORDER — BETAMETHASONE SODIUM PHOSPHATE AND BETAMETHASONE ACETATE 3; 3 MG/ML; MG/ML
12 INJECTION, SUSPENSION INTRA-ARTICULAR; INTRALESIONAL; INTRAMUSCULAR; SOFT TISSUE EVERY 24 HOURS
Status: DISCONTINUED | OUTPATIENT
Start: 2024-05-07 | End: 2024-05-07

## 2024-05-07 RX ORDER — CALCIUM CARBONATE 500 MG/1
1000 TABLET, CHEWABLE ORAL DAILY PRN
Status: DISCONTINUED | OUTPATIENT
Start: 2024-05-07 | End: 2024-05-08 | Stop reason: HOSPADM

## 2024-05-07 RX ORDER — OXYTOCIN/RINGER'S LACTATE 30/500 ML
PLASTIC BAG, INJECTION (ML) INTRAVENOUS
Status: COMPLETED
Start: 2024-05-07 | End: 2024-05-07

## 2024-05-07 RX ORDER — ONDANSETRON 2 MG/ML
4 INJECTION INTRAMUSCULAR; INTRAVENOUS EVERY 8 HOURS PRN
Status: DISCONTINUED | OUTPATIENT
Start: 2024-05-07 | End: 2024-05-07

## 2024-05-07 RX ORDER — CALCIUM CARBONATE 500 MG/1
1000 TABLET, CHEWABLE ORAL 3 TIMES DAILY PRN
Status: DISCONTINUED | OUTPATIENT
Start: 2024-05-07 | End: 2024-05-07

## 2024-05-07 RX ORDER — MAGNESIUM SULFATE HEPTAHYDRATE 40 MG/ML
2 INJECTION, SOLUTION INTRAVENOUS CONTINUOUS
Status: DISCONTINUED | OUTPATIENT
Start: 2024-05-07 | End: 2024-05-07

## 2024-05-07 RX ORDER — METRONIDAZOLE 500 MG/1
500 TABLET ORAL 2 TIMES DAILY
Status: DISCONTINUED | OUTPATIENT
Start: 2024-05-07 | End: 2024-05-08 | Stop reason: HOSPADM

## 2024-05-07 RX ORDER — ONDANSETRON 2 MG/ML
4 INJECTION INTRAMUSCULAR; INTRAVENOUS EVERY 6 HOURS PRN
Status: DISCONTINUED | OUTPATIENT
Start: 2024-05-07 | End: 2024-05-07

## 2024-05-07 RX ORDER — BUPIVACAINE HYDROCHLORIDE 7.5 MG/ML
INJECTION, SOLUTION INTRASPINAL AS NEEDED
Status: DISCONTINUED | OUTPATIENT
Start: 2024-05-07 | End: 2024-05-08 | Stop reason: HOSPADM

## 2024-05-07 RX ADMIN — ACETAMINOPHEN 650 MG: 325 TABLET, FILM COATED ORAL at 08:28

## 2024-05-07 RX ADMIN — IBUPROFEN 600 MG: 600 TABLET ORAL at 08:28

## 2024-05-07 RX ADMIN — LIDOCAINE HYDROCHLORIDE AND EPINEPHRINE 2 ML: 15; 5 INJECTION, SOLUTION EPIDURAL at 01:07

## 2024-05-07 RX ADMIN — ONDANSETRON 4 MG: 2 INJECTION INTRAMUSCULAR; INTRAVENOUS at 00:26

## 2024-05-07 RX ADMIN — HYDROCORTISONE 1 APPLICATION: 1 CREAM TOPICAL at 05:20

## 2024-05-07 RX ADMIN — METRONIDAZOLE 500 MG: 500 TABLET ORAL at 21:23

## 2024-05-07 RX ADMIN — ACETAMINOPHEN 650 MG: 325 TABLET, FILM COATED ORAL at 15:44

## 2024-05-07 RX ADMIN — METRONIDAZOLE 500 MG: 500 TABLET ORAL at 08:28

## 2024-05-07 RX ADMIN — LIDOCAINE HYDROCHLORIDE AND EPINEPHRINE 3 ML: 15; 5 INJECTION, SOLUTION EPIDURAL at 01:05

## 2024-05-07 RX ADMIN — Medication 250 MILLI-UNITS/MIN: at 01:49

## 2024-05-07 RX ADMIN — MAGNESIUM SULFATE 4 G: 4 INJECTION INTRAVENOUS at 01:25

## 2024-05-07 RX ADMIN — SODIUM CHLORIDE, SODIUM LACTATE, POTASSIUM CHLORIDE, AND CALCIUM CHLORIDE 125 ML/HR: .6; .31; .03; .02 INJECTION, SOLUTION INTRAVENOUS at 02:40

## 2024-05-07 RX ADMIN — LIDOCAINE HYDROCHLORIDE 0.6 ML: 10 INJECTION, SOLUTION EPIDURAL; INFILTRATION; INTRACAUDAL; PERINEURAL at 01:03

## 2024-05-07 RX ADMIN — SODIUM CHLORIDE, SODIUM LACTATE, POTASSIUM CHLORIDE, AND CALCIUM CHLORIDE 999 ML/HR: .6; .31; .03; .02 INJECTION, SOLUTION INTRAVENOUS at 00:30

## 2024-05-07 RX ADMIN — BUPIVACAINE HYDROCHLORIDE IN DEXTROSE 0.6 ML: 7.5 INJECTION, SOLUTION SUBARACHNOID at 01:03

## 2024-05-07 RX ADMIN — IBUPROFEN 600 MG: 600 TABLET ORAL at 15:44

## 2024-05-07 RX ADMIN — IBUPROFEN 600 MG: 600 TABLET ORAL at 21:23

## 2024-05-07 RX ADMIN — BENZOCAINE AND LEVOMENTHOL 1 APPLICATION: 200; 5 SPRAY TOPICAL at 05:20

## 2024-05-07 RX ADMIN — ACETAMINOPHEN 650 MG: 325 TABLET, FILM COATED ORAL at 21:23

## 2024-05-07 NOTE — OB LABOR/OXYTOCIN SAFETY PROGRESS
Labor Progress Note - Helena Garcia 23 y.o. female MRN: [id number]    Unit/Bed#: -01 Encounter: [id number]          Contraction Intensity: Moderate/Strong     Cervical Dilation: 10  Dilation Complete Date: 05/07/24  Dilation Complete Time: 0015  Cervical Effacement: 100  Fetal Station: 1     Fetal Heart Rate (FHT): 138 BPM  FHR Category: 2               Vital Signs:   There were no vitals filed for this visit.    Notes/comments:   Patient complete. Several blood clotts noted on exam, plan for abruption labs.   Baby was having variable decelerations and then had 5 minute decel to the 70s. Repositioned to left side with return to baseline. Dr. Murcia and Dr. Muller aware.    Kalli Mercado MD 5/7/2024 1:37 AM

## 2024-05-07 NOTE — DISCHARGE INSTR - ACTIVITY
Discharge feeding plan for NICU Pumping     Set alarms to pump every 2 hrs during the day and every 3 hrs at night  Use massage, warmth, & hand expression to stimulate glandular tissue prior to pumping.  May use nipple cream/butter/oil where tunnel and funnel meet on flange to assist movement of breast tissue inside the flange  Use breast compressions, hands on pumping techniques to assist in expressing milk    Cycle pumping - Begin the pump in stimulation mode. Once milk is visible in the tunnel of the flange, change pump setting to expression mode. Once milk is NOT seen in the tunnel, cycle back to stimulation mode.Continue cycle pumping until end of feeding.    Pump both breasts simultaneously  Use all 5 senses when pumping to increase milk transfer.  Store expressed milk or feed expressed milk via syringe, NG tube or paced bottle feeding method.   Continue to hand express between feeds to stimulate the breasts frequently    Review Milkmob on youtube or scan QR code for MilkMob video  When with baby, place baby skin to skin. Attempt to latch when medically cleared.         Milk Mob

## 2024-05-07 NOTE — ANESTHESIA PROCEDURE NOTES
CSE Block    Patient location during procedure: OB  Start time: 5/7/2024 1:03 AM  Reason for block: procedure for pain and at surgeon's request  Staffing  Performed by: Lucius Mcknight CRNA  Authorized by: Andrew Puga MD    Preanesthetic Checklist  Completed: patient identified, IV checked, site marked, risks and benefits discussed, surgical consent, monitors and equipment checked, pre-op evaluation and timeout performed  CSE  Patient position: sitting  Prep: ChloraPrep  Patient monitoring: cardiac monitor, continuous pulse ox, heart rate and frequent blood pressure checks  Approach: midline  Spinal Needle  Needle type: pencil-tip   Needle gauge: 27 G  Needle length: 10 cm  Epidural Needle  Injection technique: TJ air  Needle type: Tuohy   Needle gauge: 17 G  Needle length: 9 cm  Needle insertion depth: 6 cm  Location: L3-L4  Catheter  Catheter type: side hole  Catheter size: 19 G  Catheter at skin depth: 12 cm  Catheter securement method: stabilization device  Test dose: negative  Assessment  Sensory level: T10  Injection Assessment:  negative aspiration for heme, no paresthesia on injection, positive aspiration for clear CSF and no pain on injection.  Additional Notes  Patient very uncomfortable, agitated, unable to maintain position; x1 attempt; TJ at 6 cm, CSE with +CSF, -heme, easy intrathecal injection, smooth insertion of catheter, secured at 12cm

## 2024-05-07 NOTE — PLAN OF CARE
Problem: PAIN - ADULT  Goal: Verbalizes/displays adequate comfort level or baseline comfort level  Description: Interventions:  - Encourage patient to monitor pain and request assistance  - Assess pain using appropriate pain scale  - Administer analgesics based on type and severity of pain and evaluate response  - Implement non-pharmacological measures as appropriate and evaluate response  - Consider cultural and social influences on pain and pain management  - Notify physician/advanced practitioner if interventions unsuccessful or patient reports new pain  Outcome: Progressing     Problem: INFECTION - ADULT  Goal: Absence or prevention of progression during hospitalization  Description: INTERVENTIONS:  - Assess and monitor for signs and symptoms of infection  - Monitor lab/diagnostic results  - Monitor all insertion sites, i.e. indwelling lines, tubes, and drains  - Monitor endotracheal if appropriate and nasal secretions for changes in amount and color  - [place] appropriate cooling/warming therapies per order  - Administer medications as ordered  - Instruct and encourage patient and family to use good hand hygiene technique  - Identify and instruct in appropriate isolation precautions for identified infection/condition  Outcome: Progressing  Goal: Absence of fever/infection during neutropenic period  Description: INTERVENTIONS:  - Monitor WBC    Outcome: Progressing     Problem: SAFETY ADULT  Goal: Patient will remain free of falls  Description: INTERVENTIONS:  - Educate patient/family on patient safety including physical limitations  - Instruct patient to call for assistance with activity   - Consult OT/PT to assist with strengthening/mobility   - Keep Call bell within reach  - Keep bed low and locked with side rails adjusted as appropriate  - Keep care items and personal belongings within reach  - Initiate and maintain comfort rounds  - Make Fall Risk Sign visible to staff  - Apply yellow socks and bracelet  for high fall risk patients  - Consider moving patient to room near nurses station  Outcome: Progressing  Goal: Maintain or return to baseline ADL function  Description: INTERVENTIONS:  -  Assess patient's ability to carry out ADLs; assess patient's baseline for ADL function and identify physical deficits which impact ability to perform ADLs (bathing, care of mouth/teeth, toileting, grooming, dressing, etc.)  - Assess/evaluate cause of self-care deficits   - Assess range of motion  - Assess patient's mobility; develop plan if impaired  - Assess patient's need for assistive devices and provide as appropriate  - Encourage maximum independence but intervene and supervise when necessary  - Involve family in performance of ADLs  - Assess for home care needs following discharge   - Consider OT consult to assist with ADL evaluation and planning for discharge  - Provide patient education as appropriate  Outcome: Progressing  Goal: Maintains/Returns to pre admission functional level  Description: INTERVENTIONS:  - Perform AM-PAC 6 Click Basic Mobility/ Daily Activity assessment daily.  - Set and communicate daily mobility goal to care team and patient/family/caregiver.   - Collaborate with rehabilitation services on mobility goals if consulted  - Out of bed for toileting  - Record patient progress and toleration of activity level   Outcome: Progressing     Problem: DISCHARGE PLANNING  Goal: Discharge to home or other facility with appropriate resources  Description: INTERVENTIONS:  - Identify barriers to discharge w/patient and caregiver  - Arrange for needed discharge resources and transportation as appropriate  - Identify discharge learning needs (meds, wound care, etc.)  - Arrange for interpretive services to assist at discharge as needed  - Refer to Case Management Department for coordinating discharge planning if the patient needs post-hospital services based on physician/advanced practitioner order or complex needs  related to functional status, cognitive ability, or social support system  Outcome: Progressing     Problem: Knowledge Deficit  Goal: Patient/family/caregiver demonstrates understanding of disease process, treatment plan, medications, and discharge instructions  Description: Complete learning assessment and assess knowledge base.  Interventions:  - Provide teaching at level of understanding  - Provide teaching via preferred learning methods  Outcome: Progressing     Problem: POSTPARTUM  Goal: Experiences normal postpartum course  Description: INTERVENTIONS:  - Monitor maternal vital signs  - Assess uterine involution and lochia  Outcome: Progressing  Goal: Appropriate maternal -  bonding  Description: INTERVENTIONS:  - Identify family support  - Assess for appropriate maternal/infant bonding   -Encourage maternal/infant bonding opportunities  - Referral to  or  as needed  Outcome: Progressing  Goal: Establishment of infant feeding pattern  Description: INTERVENTIONS:  - Assess breast/bottle feeding  - Refer to lactation as needed  Outcome: Progressing  Goal: Incision(s), wounds(s) or drain site(s) healing without S/S of infection  Description: INTERVENTIONS  - Assess and document dressing, incision, wound bed, drain sites and surrounding tissue  - Provide patient and family education    Outcome: Progressing

## 2024-05-07 NOTE — CONSULTS
NICU Prenatal Consult   Helena Garcia 23 y.o. female MRN: [id number]  Unit/Bed#: -01 Encounter: [id number]    Consulting Physician: Alayna Murcia MD      A NICU Prenatal Consult has been requested by OB due to  labor.     Helena Garcia is a 23 y.o. , with an MORENA of 24 at 31w4d GA     Helena is expecting a daughter, to be named Juliette. Estimated fetal weight is 1313g (as of 5/3/24). She intends to breastfeed and consents to use of donor milk as a bridge. Pregnancy complicated by FGR and hyperemesis. Helena was admitted to L&D for  labor.     Along with Helena , her partner was present for the consultation.     Prenatal Care:Yes, see Presenting Problem above    Prenatal Labs:  Lab Results   Component Value Date/Time    ABO Grouping B 2024 02:18 AM    Rh Factor Positive 2024 02:18 AM    Rh Type Positive 2024 08:59 AM    Hepatitis B Surface Ag Non-reactive 2024 04:08 PM    HEP C AB Non Reactive 2024 08:59 AM    Hepatitis C Ab Non-reactive 2024 04:08 PM    RPR Non Reactive 2024 08:53 AM    Rubella IgG Quant 16.8 2024 04:08 PM     HIV NR  Total syphilis antibody NR  Rubella non-immune    Unknown labs at this time: GBS    Pregnancy complications:   Patient Active Problem List   Diagnosis    Hyperemesis affecting pregnancy, antepartum    Gastroesophageal reflux in pregnancy in first trimester    Rubella non-immune status, antepartum     (spontaneous vaginal delivery)    Fetal growth restriction antepartum    Prenatal care, subsequent pregnancy, third trimester     labor    Bacterial vaginosis     Maternal medical history:   Past Medical History:   Diagnosis Date    ADHD      Medications at home:   Medications Prior to Admission   Medication    acetaminophen (TYLENOL) 500 mg tablet    cephalexin (KEFLEX) 500 mg capsule    cyclobenzaprine (FLEXERIL) 10 mg tablet    famotidine (PEPCID) 20 mg tablet    aspirin 81 mg chewable tablet     metroNIDAZOLE (FLAGYL) 500 mg tablet     Maternal social history:  Social History     Tobacco Use    Smoking status: Never    Smokeless tobacco: Never   Substance Use Topics    Alcohol use: Not Currently     Maternal  medications: Tocolytics:  Magnesium      I spoke with Helena Garcia today regarding the upcoming birth of her daughter.  We discussed the baby's possible medical problems and the specialized care needed to address these problems.  This discussion included, but was not limited to:     Attendance of physician/MOMO/ nursing, and/or respiratory therapist at the delivery and delivery room management , Possible need for IV access, umbilical lines, and/or PICC lines, Possible need for prolonged parenteral nutrition, Potential need for transfusion of blood products, Risk of bronchopulmonary dysplasia, Risk of feedings problems and the potential need for IV fluids and gavage tube feeds, Risk of hypoglycemia requiring dextrose infusion, Risk of hypothermia and need for radiant warmer and/or isolette, Risk of immature cardiorespiratory control and need for monitoring and/or caffeine, Risk of intraventricular hemorrhage and possible need for brain ultrasound, Risk of jaundice requiring phototherapy, Risk of necrotizing enterocolitis and advantages of expressed breast milk, Risk of prolonged patent ductus arteriosus and possible need for pharmacologic or surgical treatment , Risk of respiratory distress and possible need for support (oxygen, CPAP, intubation, and/or surfactant), Risk of retinopathy of prematurity and possible need for ophthalmologic exams , and Risk of sepsis and possible need for lab tests and IV antibiotics        All of Helena's questions were answered to the best of my ability, and she was encouraged to contact us with any further questions.      Thank you for including us in the care of Helena Garcia. Please reach out to the on-call Neonatologist via [employer] for any further  questions that may arise.    I spent 50 minutes in consultation with Helena Garcia, of which 90% was in direct communication.    VICTORIANO Peter  - Medicine

## 2024-05-07 NOTE — CASE MANAGEMENT
Case Management Progress Note    Patient name Helena Garcia  Location /-01 MRN [id number]  : 2000 Date 2024       LOS (days): 0  Geometric Mean LOS (GMLOS) (days):   Days to GMLOS:        OBJECTIVE:        Current admission status: Inpatient  Preferred Pharmacy:   [id number] - [place], [place] - [address]  [address]  [facility] [address]  Phone: [phone] Fax: [phone]    Primary Care Provider: No primary care provider on file.    Primary Insurance: [employer]  Secondary Insurance:     PROGRESS NOTE:      CM met with MOB to introduce CM services, complete assessment, and provide CM contact info.    MOB had Significant Other and Extended Family present and verbalized agreement with personal interview with them present.    MOB reported the following:    Assessment:  Consult reason: NICU Admission  Gestational Age at Birth: 31 Weeks + 4 Days  MOB Name (& age if teen):   Helena Garcia   FOB Name (& age if teen MOB): Adán Chan    Other Legal Guardian(s) for Baby:  n/a    Other Children:   10 month old son  Housing Plan/Lives with:   Mother, Sister (sister's 2 children), 10 month old son  Insurance Coverage/Plan for Baby: MOB verbalizes that they will contact [facility] welfare office and apply baby for medical assistance ASAP.  Support System: Family and Spouse/Significant Other  Care Items: Car Seat, Diapers/Wipes, and Clothing  Method of Feeding: Breast Feeding  Breast Pump: CM ordering/ordered breast pump  Government Assistance Programs: WIC (Special Supplemental Nutrition Program for Women, Infants, and Children) and SNAP (Supplemental Nutrition Assistance Program)   Arrangements: MOB  Current Employment/Schooling: MOB staying home with baby  Mental Health History and/or Treatment:   None reported   Substance Use History and/or Treatment:   None reported   Urine Drug Screen Results: Not Applicable  Children & Youth History: None  Current  Legal Issues: N/A  Domestic/Intimate Partner Violence History: Denies.  NICU Resources: NICU Packet Provided, [facility] Referral made for care package    Discharge Plan:  Pediatrician:   [facility]  Prenatal/ Care:  [facility]   Follow-Up Appointments Needed/Scheduled: TBD  Medications/DME/Other Referrals: TBD  Transportation Plan: FOB has a vehicle    Follow-Up Needed from Care Management:     CM completed NICU open, CM will be available for any needs.

## 2024-05-07 NOTE — ANESTHESIA PREPROCEDURE EVALUATION
Procedure:  LABOR ANALGESIA    Relevant Problems   GYN   (+) 31 weeks gestation of pregnancy      Obstetrics/Gynecology   (+) Fetal growth restriction antepartum   (+) Gastroesophageal reflux in pregnancy in first trimester   (+) Hyperemesis affecting pregnancy, antepartum     Lab Results   Component Value Date    WBC 15.38 (H) 05/07/2024    HGB 12.3 05/07/2024    HCT 37.8 05/07/2024    MCV 87 05/07/2024     05/07/2024     Lab Results   Component Value Date    SODIUM 134 (L) 05/02/2024    K 4.1 05/02/2024     05/02/2024    CO2 19 (L) 05/02/2024    BUN 9 05/02/2024    CREATININE 0.63 05/02/2024    GLUC 74 05/02/2024    CALCIUM 8.3 (L) 05/02/2024            Physical Exam    Airway    Mallampati score: II         Dental   No notable dental hx     Cardiovascular  Rhythm: regular, Rate: normal, Cardiovascular exam normal    Pulmonary  Pulmonary exam normal Breath sounds clear to auscultation    Other Findings  post-pubertal.      Anesthesia Plan  ASA Score- 2     Anesthesia Type- epidural with ASA Monitors.         Additional Monitors:     Airway Plan:            Plan Factors-Exercise tolerance (METS): >4 METS.    Chart reviewed. EKG reviewed.  Existing labs reviewed. Patient summary reviewed.    Patient is not a current smoker. Patient not instructed to abstain from smoking on day of procedure. Patient did not smoke on day of surgery.            Induction-     Postoperative Plan-     Informed Consent- Anesthetic plan and risks discussed with patient.  I personally reviewed this patient with the CRNA. Discussed and agreed on the Anesthesia Plan with the CRNA..

## 2024-05-07 NOTE — PLAN OF CARE
Problem: PAIN - ADULT  Goal: Verbalizes/displays adequate comfort level or baseline comfort level  Description: Interventions:  - Encourage patient to monitor pain and request assistance  - Assess pain using appropriate pain scale  - Administer analgesics based on type and severity of pain and evaluate response  - Implement non-pharmacological measures as appropriate and evaluate response  - Consider cultural and social influences on pain and pain management  - Notify physician/advanced practitioner if interventions unsuccessful or patient reports new pain  Outcome: Progressing     Problem: INFECTION - ADULT  Goal: Absence or prevention of progression during hospitalization  Description: INTERVENTIONS:  - Assess and monitor for signs and symptoms of infection  - Monitor lab/diagnostic results  - Monitor all insertion sites, i.e. indwelling lines, tubes, and drains  - Monitor endotracheal if appropriate and nasal secretions for changes in amount and color  - [place] appropriate cooling/warming therapies per order  - Administer medications as ordered  - Instruct and encourage patient and family to use good hand hygiene technique  - Identify and instruct in appropriate isolation precautions for identified infection/condition  Outcome: Progressing  Goal: Absence of fever/infection during neutropenic period  Description: INTERVENTIONS:  - Monitor WBC    Outcome: Progressing     Problem: SAFETY ADULT  Goal: Patient will remain free of falls  Description: INTERVENTIONS:  - Educate patient/family on patient safety including physical limitations  - Instruct patient to call for assistance with activity   - Consult OT/PT to assist with strengthening/mobility   - Keep Call bell within reach  - Keep bed low and locked with side rails adjusted as appropriate  - Keep care items and personal belongings within reach  - Initiate and maintain comfort rounds  - Make Fall Risk Sign visible to staff  - Apply yellow socks and bracelet  for high fall risk patients  - Consider moving patient to room near nurses station  Outcome: Progressing  Goal: Maintain or return to baseline ADL function  Description: INTERVENTIONS:  -  Assess patient's ability to carry out ADLs; assess patient's baseline for ADL function and identify physical deficits which impact ability to perform ADLs (bathing, care of mouth/teeth, toileting, grooming, dressing, etc.)  - Assess/evaluate cause of self-care deficits   - Assess range of motion  - Assess patient's mobility; develop plan if impaired  - Assess patient's need for assistive devices and provide as appropriate  - Encourage maximum independence but intervene and supervise when necessary  - Involve family in performance of ADLs  - Assess for home care needs following discharge   - Consider OT consult to assist with ADL evaluation and planning for discharge  - Provide patient education as appropriate  Outcome: Progressing  Goal: Maintains/Returns to pre admission functional level  Description: INTERVENTIONS:  - Perform AM-PAC 6 Click Basic Mobility/ Daily Activity assessment daily.  - Set and communicate daily mobility goal to care team and patient/family/caregiver.   - Collaborate with rehabilitation services on mobility goals if consulted  - Out of bed for toileting  - Record patient progress and toleration of activity level   Outcome: Progressing     Problem: DISCHARGE PLANNING  Goal: Discharge to home or other facility with appropriate resources  Description: INTERVENTIONS:  - Identify barriers to discharge w/patient and caregiver  - Arrange for needed discharge resources and transportation as appropriate  - Identify discharge learning needs (meds, wound care, etc.)  - Arrange for interpretive services to assist at discharge as needed  - Refer to Case Management Department for coordinating discharge planning if the patient needs post-hospital services based on physician/advanced practitioner order or complex needs  related to functional status, cognitive ability, or social support system  Outcome: Progressing     Problem: Knowledge Deficit  Goal: Patient/family/caregiver demonstrates understanding of disease process, treatment plan, medications, and discharge instructions  Description: Complete learning assessment and assess knowledge base.  Interventions:  - Provide teaching at level of understanding  - Provide teaching via preferred learning methods  Outcome: Progressing     Problem: POSTPARTUM  Goal: Experiences normal postpartum course  Description: INTERVENTIONS:  - Monitor maternal vital signs  - Assess uterine involution and lochia  Outcome: Progressing  Goal: Appropriate maternal -  bonding  Description: INTERVENTIONS:  - Identify family support  - Assess for appropriate maternal/infant bonding   -Encourage maternal/infant bonding opportunities  - Referral to  or  as needed  Outcome: Progressing  Goal: Establishment of infant feeding pattern  Description: INTERVENTIONS:  - Assess breast/bottle feeding  - Refer to lactation as needed  Outcome: Progressing  Goal: Incision(s), wounds(s) or drain site(s) healing without S/S of infection  Description: INTERVENTIONS  - Assess and document dressing, incision, wound bed, drain sites and surrounding tissue  - Provide patient and family education    Outcome: Progressing

## 2024-05-07 NOTE — H&P
H & P- Obstetrics   Helena Garcia 23 y.o. female MRN: [id number]  Unit/Bed#: -01 Encounter: [id number]    Assessment: 23 y.o.  at 31w4d admitted for  labor. MFM and NICU consulted. Plan for magnesium, betamethasone, penicillin, and tocolytics.    Plan:   *  labor  Assessment & Plan  Admit to OBGYN   Clear liquid diet, IVF LR 125cc/hr   F/u T&S, CBC, RPR   Will collect GBS, GCCT  Patient had urine culture on  which was negative  SVE:   Continuous fetal monitoring and tocometry   Analgesia at maternal request   Vertex by TAUS   MFM and NICU consult placed, plan for betamethasone, magnesium, penicillin, and tocolytics    Fetal growth restriction antepartum  Assessment & Plan  Severe FGR with mildly elevated umbilical artery dopplers  On 5/3/24, baby was 3% and 2lbs 14oz    Rubella non-immune status, antepartum  Assessment & Plan  Offer MMR postpartum    Bacterial Vaginosis  Assessment & Plan  Continue flagyl for 5 more days    Discussed case and plan w/ Dr. Murcia      Chief Complaint: contractions    HPI: Helean Garcia is a 23 y.o.  with an MORENA of 2024, by Last Menstrual Period at 31w4d who is being admitted for  labor. She complains of uterine contractions, occurring every 3 minutes, has no LOF, and reports passing a blood clot prior to coming in and no more since then. She states she has felt good FM.    Patient Active Problem List   Diagnosis    Hyperemesis affecting pregnancy, antepartum    Gastroesophageal reflux in pregnancy in first trimester    Abdominal pain during pregnancy    Rubella non-immune status, antepartum     (spontaneous vaginal delivery)    Fetal growth restriction antepartum    Prenatal care, subsequent pregnancy, third trimester     labor    Bacterial vaginosis       Baby complications/comments: Severe FGR with mildly elevated dopplers    Review of Systems   Constitutional:  Negative for chills and fever.   HENT: Negative.      Respiratory:  Negative for cough and shortness of breath.    Cardiovascular:  Negative for chest pain.   Gastrointestinal:  Positive for nausea and vomiting. Negative for abdominal pain.   Genitourinary:  Positive for vaginal bleeding.   Musculoskeletal: Negative.    Neurological:  Negative for headaches.   Psychiatric/Behavioral: Negative.         OB Hx:  OB History    Para Term  AB Living   2 1 1 0 0 1   SAB IAB Ectopic Multiple Live Births   0 0 0 0 1      # Outcome Date GA Lbr Alvaro/2nd Weight Sex Delivery Anes PTL Lv   2 Current            1 Term 23 38w5d / 00:31 2465 g (5 lb 7 oz) M Vag-Spont   SERINA       Past Medical Hx:  Past Medical History:   Diagnosis Date    ADHD        Past Surgical hx:  History reviewed. No pertinent surgical history.    Social Hx:  Alcohol use: denies  Tobacco use: denies  Other substance use: endorses marijuana use    Allergies   Allergen Reactions    Latex Rash       Medications Prior to Admission   Medication    acetaminophen (TYLENOL) 500 mg tablet    aspirin 81 mg chewable tablet    cephalexin (KEFLEX) 500 mg capsule    cyclobenzaprine (FLEXERIL) 10 mg tablet    famotidine (PEPCID) 20 mg tablet    metroNIDAZOLE (FLAGYL) 500 mg tablet       Objective:     BP: 135/89  HR 84  O2 Sat: 100%    Physical Exam:  Physical Exam  HENT:      Head: Normocephalic and atraumatic.      Mouth/Throat:      Mouth: Mucous membranes are moist.   Cardiovascular:      Rate and Rhythm: Normal rate.      Pulses: Normal pulses.   Pulmonary:      Effort: Pulmonary effort is normal. No respiratory distress.      Breath sounds: Normal breath sounds.   Abdominal:      General: There is no distension.   Neurological:      General: No focal deficit present.      Mental Status: She is alert.   Skin:     General: Skin is warm and dry.   Psychiatric:         Mood and Affect: Mood normal.         Behavior: Behavior normal.   Vitals reviewed.        FHT:  Baseline 140bpm / moderate variability /  15 x 15 accelerations present / intermittent variable decelerations noted    TOCO:   Contraction Intensity: Moderate/Strong    Lab Results   Component Value Date    WBC 15.38 (H) 05/07/2024    HGB 12.3 05/07/2024    HCT 37.8 05/07/2024     05/07/2024     Lab Results   Component Value Date    K 4.1 05/02/2024     05/02/2024    CO2 19 (L) 05/02/2024    BUN 9 05/02/2024    CREATININE 0.63 05/02/2024    AST 11 (L) 05/02/2024    ALT 7 05/02/2024       Prenatal Labs: Reviewed      Blood type: B positive  Antibody: negative  GBS: unknown  HIV: Non-reactive  Rubella: Immune  Syphilis IgM/IgG: Non-reactive  HBsAg: Non-reactive  HCAb: Non-reactive  Chlamydia: Negative  Gonorrhea: Negative  Diabetes 1 hour screen: did not complete  3 hour glucose: n/a  Platelets: 266k  Hgb: 12.5    >2 Midnights  INPATIENT     Signature/Title: Kalli Mercado MD  Date: 5/7/2024  Time: 2:12 AM

## 2024-05-07 NOTE — CONSULTS
"Consultation - Maternal Fetal Medicine   Helena Garcia 23 y.o. female MRN: [id number]  Unit/Bed#: -01 Encounter: [id number]  Admit date: 2024.  Today's date: 24    Assessment/Plan   Ms. Garcia is a 23 y.o. year-old  at 31w4d, Hospital Day: 2, admitted for  labor.  By issue:    *  labor  Assessment & Plan  Patient presents with contractions every 2-3 min and passage of a small blood clot  FHT category 2 with intermittent variable and late declarations  [place] with contractions every 2-3 min  SVE 4/90/-2 with bulging membranes    Admit for  labor  Analgesia at maternal request (ASAP per patient)  Continuous monitoring  Clear liquid diet  Routine admission labs (CBC, T&S, syphilis screen)  Penicillin for GBS prophylaxis (unknown GBS status and )  Magnesium for fetal neuroprotection (6g bolus followed by 2 g/hr maintenance rate)  Betamethasone for fetal lung maturation (12 mg IM q24h x2 doses)  Procardia for tocolysis (30 mg PO once followed by 10 mg q6h x72hr)  NICU consulted    31 weeks gestation of pregnancy  Assessment & Plan  Patient presents with contractions every 2-3 min and passage of a small blood clot  FHT category 2 with intermittent variable and late declarations  [place] with contractions every 2-3 min  SVE 4/90/-2 with bulging membranes  Prenatal labs notable for Rubella non-immune status, otherwise normal    Daily PNV  Remainder of plan as above under \" labor\"    Bacterial vaginosis  Assessment & Plan  Continue Flagyl as ordered outpatient    Fetal growth restriction antepartum  Assessment & Plan  EFW 1313g, 2lb 14oz (3%)    Rubella non-immune status, antepartum  Assessment & Plan  Recommend MMR postpartum        Physician Requesting Consult: Alayna Murcia MD    Reason for Consult / Principal Problem:  labor    Subspeciality: Perinatology    HPI:  Helena Garcia is a 23 y.o.  with an MORENA of 24 at 31w4d, Hospital Day: 2, admitted " for  labor.  Her current pregnancy is significant for Rubella non-immune status and FGR.  Her obstetrical history is significant for 1 prior term .  She presents with contractions every 2-3 minutes and is requesting her epidural.  She endorses passing a small blood clot.  She denies leaking fluid or decreased fetal movement.    Review of Systems   Constitutional:  Negative for chills and fever.   Eyes:  Negative for visual disturbance.   Respiratory:  Negative for cough and shortness of breath.    Cardiovascular:  Negative for chest pain and leg swelling.   Gastrointestinal:  Positive for abdominal pain. Negative for diarrhea, nausea and vomiting.   Genitourinary:  Positive for pelvic pain and vaginal bleeding. Negative for dysuria, frequency, hematuria, urgency and vaginal discharge.   Neurological:  Negative for dizziness, light-headedness and headaches.     Historical Information   OB History    Para Term  AB Living   2 1 1 0 0 1   SAB IAB Ectopic Multiple Live Births   0 0 0 0 1      # Outcome Date GA Lbr Alvaro/2nd Weight Sex Delivery Anes PTL Lv   2 Current            1 Term 23 38w5d / 00:31 2465 g (5 lb 7 oz) M Vag-Spont   SERINA     Gynecologic history:  Patient's last menstrual period was 2023.     Past Medical History:   Diagnosis Date    ADHD      History reviewed. No pertinent surgical history.  Social History   Social History     Substance and Sexual Activity   Alcohol Use Not Currently     Social History     Substance and Sexual Activity   Drug Use Yes    Types: Marijuana    Comment: on occ     Social History     Tobacco Use   Smoking Status Never   Smokeless Tobacco Never     Meds/Allergies   Prior to Admission Medications   Prescriptions Last Dose Informant Patient Reported? Taking?   acetaminophen (TYLENOL) 500 mg tablet   No No   Sig: Take 2 tablets (1,000 mg total) by mouth every 6 (six) hours as needed for mild pain   aspirin 81 mg chewable tablet   No No   Sig: Chew  2 tablets (162 mg total) daily at bedtime   cephalexin (KEFLEX) 500 mg capsule   No No   Sig: Take 1 capsule (500 mg total) by mouth every 6 (six) hours for 7 days   cyclobenzaprine (FLEXERIL) 10 mg tablet   No No   Sig: Take 1 tablet (10 mg total) by mouth 3 (three) times a day as needed for muscle spasms for up to 7 days   famotidine (PEPCID) 20 mg tablet   No No   Sig: Take 1 tablet (20 mg total) by mouth daily   metroNIDAZOLE (FLAGYL) 500 mg tablet   No No   Sig: Take 1 tablet (500 mg total) by mouth every 8 (eight) hours for 7 days      Facility-Administered Medications: None     Medication Administration - last 24 hours from 05/06/2024 0106 to 05/07/2024 0106         Date/Time Order Dose Route Action Action by     05/07/2024 0026 EDT ondansetron (ZOFRAN) injection 4 mg 4 mg Intravenous Given Femi Aquino RN          Current Facility-Administered Medications   Medication Dose Route Frequency    acetaminophen (TYLENOL) tablet 975 mg  975 mg Oral Q8H PRN    betamethasone acetate-betamethasone sodium phosphate (CELESTONE) injection 12 mg  12 mg Intramuscular Q24H    calcium carbonate (TUMS) chewable tablet 1,000 mg  1,000 mg Oral TID PRN    calcium gluconate 10 % injection 1 g  1 g Intravenous Once PRN    lactated ringers infusion  125 mL/hr Intravenous Continuous    magnesium sulfate 4 g/100 mL IVPB (premix) 4 g  4 g Intravenous Once    Followed by    magnesium sulfate 2 g/50 mL IVPB (premix) 2 g  2 g Intravenous Once    magnesium sulfate 20 g/500 mL infusion (premix)  2 g/hr Intravenous Continuous    metroNIDAZOLE (FLAGYL) tablet 500 mg  500 mg Oral BID    NIFEdipine (PROCARDIA) capsule 30 mg  30 mg Oral Once    Followed by    NIFEdipine (PROCARDIA) capsule 10 mg  10 mg Oral Q6H    ondansetron (ZOFRAN) injection 4 mg  4 mg Intravenous Q6H PRN    ondansetron (ZOFRAN) injection 4 mg  4 mg Intravenous Q8H PRN    penicillin G (PFIZERPEN-G) in 0.9% sodium chloride 250 mL IVPB 5 Million Units  5 Million Units  "Intravenous Once    Followed by    penicillin G (PFIZERPEN-G) in 0.9% sodium chloride 100 mL IVPB 2.5 Million Units  2.5 Million Units Intravenous Q4H    ropivacaine 0.2% PCEA   Epidural Continuous     Allergies   Allergen Reactions    Latex Rash       Objective    No data found.    Vitals:  Blood pressure 120/71, pulse 85, temperature 97.8 °F (36.6 °C), temperature source Oral, resp. rate 18, height 5' 7\" (1.702 m), weight 78 kg (172 lb), last menstrual period 09/29/2023, SpO2 99%, currently breastfeeding.There is no height or weight on file to calculate BMI.    Physical Exam  Constitutional:       General: She is not in acute distress.     Appearance: Normal appearance. She is not ill-appearing.   HENT:      Mouth/Throat:      Mouth: Mucous membranes are moist.   Eyes:      Extraocular Movements: Extraocular movements intact.   Cardiovascular:      Rate and Rhythm: Normal rate.   Pulmonary:      Effort: Pulmonary effort is normal.   Abdominal:      General: There is no distension.      Palpations: Abdomen is soft.      Tenderness: There is no abdominal tenderness. There is no guarding.   Musculoskeletal:         General: No swelling or tenderness.      Right lower leg: No edema.      Left lower leg: No edema.   Skin:     General: Skin is warm and dry.      Coloration: Skin is not jaundiced or pale.   Neurological:      General: No focal deficit present.      Mental Status: She is alert.   Psychiatric:         Mood and Affect: Mood normal.         Behavior: Behavior normal.         Thought Content: Thought content normal.         Judgment: Judgment normal.       SVE: 4/90/-2 with bulging membranes    Prenatal Labs: I have personally reviewed pertinent reports.      Results from last 7 days   Lab Units 05/07/24  0021 05/02/24  1234   WBC Thousand/uL 15.38* 11.41*   HEMOGLOBIN g/dL 12.3 12.7   MCV fL 87 88   PLATELETS Thousands/uL 305 278         Results from last 7 days   Lab Units 05/02/24  1234   POTASSIUM mmol/L " 4.1   CHLORIDE mmol/L 108   CO2 mmol/L 19*   BUN mg/dL 9   CREATININE mg/dL 0.63   EGFR ml/min/1.73sq m 125     Results from last 7 days   Lab Units 05/02/24  1234   AST U/L 11*   ALT U/L 7   ALK PHOS U/L 110*     Results from last 7 days   Lab Units 05/07/24  0021 05/02/24  1234   PLATELETS Thousands/uL 305 278         Results from last 7 days   Lab Units 05/02/24  1217   PROT/CREAT RATIO UR  0.13*             Results from last 7 days   Lab Units 05/02/24  1217   URINE CULTURE  <10,000 cfu/ml       Fetal data:  Baseline: 140 bpm  Variability: moderate  Accelerations: present  Decelerations: intermittent variable decelerations  [place]: contractionse very 2-3 min  FHT category 2    MFM ultrasound report key findings:  Level I US on 5/3/24 at 31w0d:  EFW 1313g, 2lb 14oz (3%)  Posterior placenta  Cephalic presentation  KIRAN Total = 11.5 cm (normal)    Imaging, EKG, Pathology, and Other Studies: I have personally reviewed pertinent reports.          Catie Beaver MD  5/7/2024  1:06 AM

## 2024-05-07 NOTE — ASSESSMENT & PLAN NOTE
"Patient presents with contractions every 2-3 min and passage of a small blood clot  FHT category 2 with intermittent variable and late declarations  [place] with contractions every 2-3 min  SVE /-2 with bulging membranes  Prenatal labs notable for Rubella non-immune status, otherwise normal    Daily PNV  Remainder of plan as above under \" labor\"  "

## 2024-05-07 NOTE — ASSESSMENT & PLAN NOTE
Admit to OBGYN   Clear liquid diet, IVF LR 125cc/hr   F/u T&S, CBC, RPR   Will collect GBS, GCCT  Patient had urine culture on 5/2 which was negative  SVE: 4/90/-2  Continuous fetal monitoring and tocometry   Analgesia at maternal request   Vertex by ADAN EUCEDA and NICU consult placed, plan for betamethasone, magnesium, penicillin, and tocolytics

## 2024-05-07 NOTE — ASSESSMENT & PLAN NOTE
Patient presents with contractions every 2-3 min and passage of a small blood clot  FHT category 2 with intermittent variable and late declarations  [place] with contractions every 2-3 min  SVE 4/90/-2 with bulging membranes    Admit for  labor  Analgesia at maternal request (ASAP per patient)  Continuous monitoring  Clear liquid diet  Routine admission labs (CBC, T&S, syphilis screen)  Penicillin for GBS prophylaxis (unknown GBS status and )  Magnesium for fetal neuroprotection (6g bolus followed by 2 g/hr maintenance rate)  Betamethasone for fetal lung maturation (12 mg IM q24h x2 doses)  Procardia for tocolysis (30 mg PO once followed by 10 mg q6h x72hr)  NICU consulted   DISPLAY PLAN FREE TEXT

## 2024-05-07 NOTE — PLAN OF CARE
Problem: PAIN - ADULT  Goal: Verbalizes/displays adequate comfort level or baseline comfort level  Description: Interventions:  - Encourage patient to monitor pain and request assistance  - Assess pain using appropriate pain scale  - Administer analgesics based on type and severity of pain and evaluate response  - Implement non-pharmacological measures as appropriate and evaluate response  - Consider cultural and social influences on pain and pain management  - Notify physician/advanced practitioner if interventions unsuccessful or patient reports new pain  Outcome: Progressing     Problem: INFECTION - ADULT  Goal: Absence or prevention of progression during hospitalization  Description: INTERVENTIONS:  - Assess and monitor for signs and symptoms of infection  - Monitor lab/diagnostic results  - Monitor all insertion sites, i.e. indwelling lines, tubes, and drains  - Monitor endotracheal if appropriate and nasal secretions for changes in amount and color  - [place] appropriate cooling/warming therapies per order  - Administer medications as ordered  - Instruct and encourage patient and family to use good hand hygiene technique  - Identify and instruct in appropriate isolation precautions for identified infection/condition  Outcome: Progressing  Goal: Absence of fever/infection during neutropenic period  Description: INTERVENTIONS:  - Monitor WBC    Outcome: Progressing     Problem: SAFETY ADULT  Goal: Patient will remain free of falls  Description: INTERVENTIONS:  - Educate patient/family on patient safety including physical limitations  - Instruct patient to call for assistance with activity   - Consult OT/PT to assist with strengthening/mobility   - Keep Call bell within reach  - Keep bed low and locked with side rails adjusted as appropriate  - Keep care items and personal belongings within reach  - Initiate and maintain comfort rounds  - Make Fall Risk Sign visible to staff  - Offer Toileting every  Hours,  in advance of need  - Initiate/Maintain alarm  - Obtain necessary fall risk management equipment:   - Apply yellow socks and bracelet for high fall risk patients  - Consider moving patient to room near nurses station  Outcome: Progressing  Goal: Maintain or return to baseline ADL function  Description: INTERVENTIONS:  -  Assess patient's ability to carry out ADLs; assess patient's baseline for ADL function and identify physical deficits which impact ability to perform ADLs (bathing, care of mouth/teeth, toileting, grooming, dressing, etc.)  - Assess/evaluate cause of self-care deficits   - Assess range of motion  - Assess patient's mobility; develop plan if impaired  - Assess patient's need for assistive devices and provide as appropriate  - Encourage maximum independence but intervene and supervise when necessary  - Involve family in performance of ADLs  - Assess for home care needs following discharge   - Consider OT consult to assist with ADL evaluation and planning for discharge  - Provide patient education as appropriate  Outcome: Progressing  Goal: Maintains/Returns to pre admission functional level  Description: INTERVENTIONS:  - Perform AM-PAC 6 Click Basic Mobility/ Daily Activity assessment daily.  - Set and communicate daily mobility goal to care team and patient/family/caregiver.   - Collaborate with rehabilitation services on mobility goals if consulted  - Perform Range of Motion  times a day.  - Reposition patient every  hours.  - Dangle patient  times a day  - Stand patient  times a day  - Ambulate patient  times a day  - Out of bed to chair  times a day   - Out of bed for meals  times a day  - Out of bed for toileting  - Record patient progress and toleration of activity level   Outcome: Progressing     Problem: DISCHARGE PLANNING  Goal: Discharge to home or other facility with appropriate resources  Description: INTERVENTIONS:  - Identify barriers to discharge w/patient and caregiver  - Arrange for  needed discharge resources and transportation as appropriate  - Identify discharge learning needs (meds, wound care, etc.)  - Arrange for interpretive services to assist at discharge as needed  - Refer to Case Management Department for coordinating discharge planning if the patient needs post-hospital services based on physician/advanced practitioner order or complex needs related to functional status, cognitive ability, or social support system  Outcome: Progressing     Problem: Knowledge Deficit  Goal: Patient/family/caregiver demonstrates understanding of disease process, treatment plan, medications, and discharge instructions  Description: Complete learning assessment and assess knowledge base.  Interventions:  - Provide teaching at level of understanding  - Provide teaching via preferred learning methods  Outcome: Progressing     Problem: POSTPARTUM  Goal: Experiences normal postpartum course  Description: INTERVENTIONS:  - Monitor maternal vital signs  - Assess uterine involution and lochia  Outcome: Progressing  Goal: Appropriate maternal -  bonding  Description: INTERVENTIONS:  - Identify family support  - Assess for appropriate maternal/infant bonding   -Encourage maternal/infant bonding opportunities  - Referral to  or  as needed  Outcome: Progressing  Goal: Establishment of infant feeding pattern  Description: INTERVENTIONS:  - Assess breast/bottle feeding  - Refer to lactation as needed  Outcome: Progressing  Goal: Incision(s), wounds(s) or drain site(s) healing without S/S of infection  Description: INTERVENTIONS  - Assess and document dressing, incision, wound bed, drain sites and surrounding tissue  - Provide patient and family education  - Perform skin care/dressing changes every   Outcome: Progressing

## 2024-05-07 NOTE — L&D DELIVERY NOTE
Vaginal Delivery Summary - OB/GYN   Helena Garcia 23 y.o. female MRN: [id number]  Unit/Bed#: -01 Encounter: [id number]    Pre-delivery Diagnosis:   Pregnancy at 31 weeks gestation    labor  Bacterial vaginosis  Rubella NI  Fetal growth restriction    Post-delivery Diagnosis:   Same, delivered    Procedure: Spontaneous Vaginal Delivery     Attending Physician: Dr. Muller  Resident Physician: Dr. Kalli Mercado    Anesthesia: Epidural    QBL: pending  Admission H.3 g/dL  Admission platelets: 305 thousands/uL    Complications: none apparent    Specimens:   1. Arterial and venous cord gases  2. Cord blood  3. Segment of umbilical cord  4. Placenta to pathology     Findings:  1. Viable female on 2024 at 1:47 AM , with APGARS of   and   at 1 and 5 minutes respectively. Weight pending at time of dictation for skin to skin bonding.  2. Spontaneous delivery of intact placenta at 0150  3. No lacerations    Gases:  Umbilical Artery  Recent Labs     24  0152   PHCART 7.175*   BECART -9.3*       Umbilical Vein  Recent Labs     24  0152   PHCVEN 7.197   BECVEN -9.0*         Brief history and labor course:  Helena Garcia is now a 23 y.o.  at 31w4d who presented to labor and delivery in  labor. Her pregnancy was complicated by fetal growth restriction with mildly elevated umbilical dopplers. On exam, she was noted to be 4/90/-2. She received an epidural and spontaneously ruptured her membranes for clear fluid. Several blood clots were passed and abruption labs are pending. She progressed to complete cervical dilation and began pushing.    Description of delivery:    After pushing for less than 1 minute, Helena delivered a viable female , wt pending as mother is doing skin to skin bonding. The fetal vertex delivered EMMY position spontaneously. There was a loose nuchal cord that was delivered through. The anterior shoulder delivered atraumatically with maternal expulsive forces  and the assistance of gentle downward traction. The posterior shoulder delivered with maternal expulsive forces and the assistance of gentle upward traction. The remainder of the fetus delivered spontaneously.     Upon delivery, the infant was placed on the mothers abdomen and the cord was doubly clamped and cut. Delayed cord clamping was achieved. The infant was noted to cry spontaneously and was moving all extremities appropriately. There was no evidence for injury. Nurse resuscitators and NICU providers evaluating the . Arterial and venous cord blood gases and cord blood was collected for analysis. These were promptly sent to the lab. In the immediate post-partum, active management of the 3rd stage of labor was performed with massage, the administration of 30 units of IV pitocin, and gentle traction on the umbilical cord. The placenta delivered spontaneously and was noted to have an eccentrically inserted 3 vessel cord.  The placenta was sent to pathology.    The vagina, cervix, perineum, and rectum were inspected and noted to be intact.    Bimanual exam revealed minimal clots and good uterine tone.  The fundus was firm and at the level of the umbilicus.  At the conclusion of the procedure, all needle, sponge, and instrument counts were noted to be correct. Patient tolerated the procedure well and was allowed to recover in labor and delivery room with family and  before being transferred to the post-partum floor. Dr. Muller was present and participated in all key portions of the case.    Disposition:  The patient tolerated the procedure well and is recovering in labor and delivery room. Baby was transferred to the NICU.      Kalli Mercado MD  OB/GYN PGY-1  2024  2:17 AM

## 2024-05-07 NOTE — DISCHARGE SUMMARY
Obstetrics Discharge Summary  Helena Garcia 23 y.o. female MRN: [id number]  Unit/Bed#: -01 Encounter: [id number]    Admission Date: 2024     Discharge Date: ***    Admitting Attending: Dr. Murcia  Delivery Attending: Dr. Muller  Discharging Attending:   ***    Admitting Diagnoses:   31 weeks gestation of pregnancy   labor  Fetal growth restriction with mildly elevated dopplers  Bacterial vaginosis  Rubella Non-immune status    Discharge Diagnoses:   Same, delivered  ***    Procedures: Spontaneous vaginal delivery    Anesthesia: epidural    Hospital course: Helena Garcia is now a 23 y.o.  at 31w4d who presented to labor and delivery in  labor. Her pregnancy was complicated by fetal growth restriction with mildly elevated umbilical dopplers. On exam, she was noted to be 4/90/-2. She received an epidural and spontaneously ruptured her membranes for clear fluid. Several blood clots were passed and abruption labs are pending. She progressed to complete cervical dilation and began pushing.    Delivery Findings:  After pushing for less than 1 minute, Helena delivered a viable female  on 2024  1:47 AM  via Vaginal, Spontaneous . The delivery was uncomplicated.  Perineum remained intact. Patient tolerated the procedure well.  was admitted to the NICU.    Baby's Weight:  ;      Apgar scores:   and   at 1 and 5 minutes, respectively  QBL:          Her post-delivery course was un***complicated. Her postpartum pain was well controlled with oral analgesics. Maternal blood type is B positive so RhoGAM was not indicated.    On day of discharge, she was ambulating and able to reasonably perform all ADLs. She was voiding and had appropriate bowel function. Pain was well controlled. She was discharged home on postpartum day #*** without complications. Patient was instructed to follow up with her OBGYN as an outpatient and was given appropriate warnings to call provider if she  develops signs of infection or uncontrolled pain.    Complications: none apparent    Condition at discharge: Good    Disposition: Home    Planned Readmission: No    Discharge Medications:   Please see AVS for a complete list of discharge medications.    Discharge instructions :   -Do not place anything (no partner, tampons or douche) in your vagina for 6 weeks  -You may walk for exercise for the first 6 weeks then gradually return to your usual activities   -Please do not drive for 1 week if you have no stitches and for 2 weeks if you have stitches    -You may take baths or shower per your preference   -Please examine your breasts in the mirror daily and call your doctor for redness or tenderness or increased warmth    -Please call your doctor's office if temperature > 100.4*F or 38* C, worsening pain or a foul discharge.    Follow Up:  - Follow up in 3 weeks for postpartum visit    ***

## 2024-05-07 NOTE — LACTATION NOTE
Follow up Lactation: RN states mom is complaining of nipple pain on the R breast with pumping.     Requested to measure nipples: R nipple at base has visible edema. Ed. On lanolin inside where the tunnel and funnel meet. Provided 28.5 mm flange. L nipple measures at 25 mm flange. Provided Hand pump demonstration with teach back.    Enc. Every 2-3 hrs.     Enc. To pump in NICU.    Enc. To call lactation for additional support.    Discharge feeding plan for NICU Pumping     Set alarms to pump every 2 hrs during the day and every 3 hrs at night  Use massage, warmth, & hand expression to stimulate glandular tissue prior to pumping.  May use nipple cream/butter/oil where tunnel and funnel meet on flange to assist movement of breast tissue inside the flange  Use breast compressions, hands on pumping techniques to assist in expressing milk    Cycle pumping - Begin the pump in stimulation mode. Once milk is visible in the tunnel of the flange, change pump setting to expression mode. Once milk is NOT seen in the tunnel, cycle back to stimulation mode.Continue cycle pumping until end of feeding.    Pump both breasts simultaneously  Use all 5 senses when pumping to increase milk transfer.  Store expressed milk or feed expressed milk via syringe, NG tube or paced bottle feeding method.   Continue to hand express between feeds to stimulate the breasts frequently    Review Milkmob on youtube or scan QR code for MilkMob video  When with baby, place baby skin to skin. Attempt to latch when medically cleared.         Milk Mob

## 2024-05-07 NOTE — ASSESSMENT & PLAN NOTE
-  delivery at 31w4d after  labor  - Pain well controlled with oral analgesics  - Voiding spontaneously  - Tolerating PO fluids and solids  - Ambulating without difficulty  - Contraception: Depo  - Baby in NICU

## 2024-05-07 NOTE — CASE MANAGEMENT
Case Management Progress Note    Patient name Helena Garcia  Location /-01 MRN [id number]  : 2000 Date 2024       LOS (days): 0  Geometric Mean LOS (GMLOS) (days):   Days to GMLOS:        OBJECTIVE:        Current admission status: Inpatient  Preferred Pharmacy:   [facility] [address] [place], [place] - [address]  [address]  [facility] [address]  Phone: [phone] Fax: [phone]    Primary Care Provider: No primary care provider on file.    Primary Insurance: [employer]  Secondary Insurance:     PROGRESS NOTE:    CM received consult for MOB requesting Medela Pump in Style  breast pump for home use. CM reviewed [place] and pump was ordered through [facility] by OP provider prior to admission. CM notified [employer] team via email that baby has been born and requested pump be delivered to MOB's bedside.

## 2024-05-07 NOTE — LACTATION NOTE
CONSULT - LACTATION  Helena Garcia 23 y.o. female MRN: [id number]    [facility] L&D Room / Bed: / 316-01 Encounter: [id number]    Maternal Information     MOTHER:  N/A  Maternal Age: This patient's mother is not on file.  OB History: This patient's mother is not on file.  Previouse breast reduction surgery? No    Lactation history:   Has patient previously breast fed: No   How long had patient previously breast fed:     Previous breast feeding complications:     This patient's mother is not on file.    Birth information:  YOB: 2000   Time of birth:     Sex: female   Delivery type:     Birth Weight: No birth weight on file.   Percent of Weight Change: Birth weight not on file     Gestational Age: <None>   [unfilled]    Assessment      05/07/24 0948   Lactation Consultation   Reason for Consult 10;10 minute   Risk Factors NICU infant   Maternal Information   Has mother  before? No   Exclusive Pump and Bottle Feed Yes   Breasts/Nipples   Date Pumping Initiated 05/07/24   Breastfeeding Status Yes   Breastfeeding Progress Pumping only   Reasons for not Breastfeeding Infant medical condition   Other OB Lactation Tools   Feeding Devices Syringe   Breast Pump   Pump 2;1  (stork pump pamphlet given)   Initiated by nurse staff   Date Initiated 05/07/24   Patient Follow-Up   Lactation Consult Status 2   Follow-Up Type Inpatient;Call as needed   Other OB Lactation Documentation    Additional Problem Noted Mom was set up by nursing staff on how to pump. attempted to review with mom how to cycle through a pumping session but Helena occupied by phone call. [employer] pump pamphlet given for mom to choose pump. Encouraged mom to pump every 2-3 hours to establish milk supply. Mom states she will call if she needs help  (RSB and DC booklets at bedside.)        Feeding recommendations:  pump every 2-3 hours  Mom was set up by nursing staff on how to pump. attempted to  review with mom how to cycle through a pumping session but Helena occupied by phone call. [employer] pump pamphlet given for mom to choose pump. Encouraged mom to pump every 2-3 hours to establish milk supply. Mom states she will call if she needs help.    Encouraged parents to call for assistance, questions, and concerns about breastfeeding.  Extension provided.      Evangelina Gill 5/7/2024 9:51 AM

## 2024-05-08 VITALS
DIASTOLIC BLOOD PRESSURE: 70 MMHG | SYSTOLIC BLOOD PRESSURE: 135 MMHG | HEART RATE: 86 BPM | RESPIRATION RATE: 17 BRPM | BODY MASS INDEX: 27 KG/M2 | OXYGEN SATURATION: 99 % | TEMPERATURE: 98.1 F | HEIGHT: 67 IN | WEIGHT: 172 LBS

## 2024-05-08 PROBLEM — Z3A.31 31 WEEKS GESTATION OF PREGNANCY: Status: ACTIVE | Noted: 2024-05-08

## 2024-05-08 LAB
C TRACH DNA SPEC QL NAA+PROBE: NEGATIVE
N GONORRHOEA DNA SPEC QL NAA+PROBE: NEGATIVE

## 2024-05-08 PROCEDURE — 99024 POSTOP FOLLOW-UP VISIT: CPT | Performed by: PHYSICIAN ASSISTANT

## 2024-05-08 PROCEDURE — 90746 HEPB VACCINE 3 DOSE ADULT IM: CPT

## 2024-05-08 RX ORDER — IBUPROFEN 600 MG/1
600 TABLET ORAL EVERY 6 HOURS
Qty: 30 TABLET | Refills: 0 | Status: SHIPPED | OUTPATIENT
Start: 2024-05-08

## 2024-05-08 RX ORDER — MEDROXYPROGESTERONE ACETATE 150 MG/ML
150 INJECTION, SUSPENSION INTRAMUSCULAR ONCE
Status: COMPLETED | OUTPATIENT
Start: 2024-05-08 | End: 2024-05-08

## 2024-05-08 RX ORDER — METRONIDAZOLE 500 MG/1
500 TABLET ORAL 2 TIMES DAILY
Qty: 10 TABLET | Refills: 0 | Status: SHIPPED | OUTPATIENT
Start: 2024-05-08 | End: 2024-05-13

## 2024-05-08 RX ORDER — ACETAMINOPHEN 325 MG/1
650 TABLET ORAL EVERY 6 HOURS
Start: 2024-05-08

## 2024-05-08 RX ORDER — BENZOCAINE/MENTHOL 6 MG-10 MG
1 LOZENGE MUCOUS MEMBRANE DAILY PRN
Start: 2024-05-08

## 2024-05-08 RX ADMIN — MEDROXYPROGESTERONE ACETATE 150 MG: 150 INJECTION, SUSPENSION INTRAMUSCULAR at 16:29

## 2024-05-08 RX ADMIN — HEPATITIS B VACCINE (RECOMBINANT) 20 MCG: 20 INJECTION, SUSPENSION INTRAMUSCULAR at 15:16

## 2024-05-08 RX ADMIN — METRONIDAZOLE 500 MG: 500 TABLET ORAL at 08:06

## 2024-05-08 NOTE — PROGRESS NOTES
"Progress Note - OB/GYN  Helena Garcia 23 y.o. female MRN: [id number]  Unit/Bed#: -01 Encounter: [id number]    Assessment and Plan     Helena Garcia is a patient of: [facility]. She is PPD# 1 s/p  spontaneous vaginal delivery  Recovering well and is stable       Bacterial vaginosis  Assessment & Plan  Continue Flagyl      (spontaneous vaginal delivery)  Assessment & Plan  -  delivery at 31w4d after  labor  - Pain well controlled with oral analgesics  - Voiding spontaneously  - Tolerating PO fluids and solids  - Ambulating without difficulty  - Contraception: Depo  - Baby in NICU    Rubella non-immune status, antepartum  Assessment & Plan  Offer MMR postpartum        Disposition    - Anticipate discharge home on PPD# 1      Subjective/Objective     Chief Complaint: Postpartum State     Subjective:    Helena Garcia is PPD/POD#1 s/p  spontaneous vaginal delivery. She has no current complaints.  Pain is well controlled. She is recovering well and is stable.     Breastfeeding:  pumping     Tolerating PO: yes  Nausea or Vomiting: no  Voiding: yes  Flatus: no has had no bowel movement.   Ambulating: yes  Chest pain: no  Shortness of breath: no  Leg pain: no  Lochia: appropriate     Vitals:   /64 (BP Location: Left arm)   Pulse 69   Temp 98.2 °F (36.8 °C) (Oral)   Resp 18   Ht 5' 7\" (1.702 m)   Wt 78 kg (172 lb)   LMP 2023   SpO2 99%   Breastfeeding Yes   BMI 26.94 kg/m²       Intake/Output Summary (Last 24 hours) at 2024 0702  Last data filed at 2024 0855  Gross per 24 hour   Intake --   Output 500 ml   Net -500 ml       Invasive Devices       None                   Physical Exam:   GEN: Helena Garcia appears well, alert and oriented x 3, pleasant and cooperative   CARDIO: RRR, no murmurs or rubs  RESP:  CTAB, no wheezes or rales  ABDOMEN: soft, no tenderness, no distention, fundus @ umb  EXTREMITIES:non tender, no erythema, b/l Zbigniew's sign " negative      Labs:     Hemoglobin   Date Value Ref Range Status   05/07/2024 12.3 11.5 - 15.4 g/dL Final   05/02/2024 12.7 11.5 - 15.4 g/dL Final     WBC   Date Value Ref Range Status   05/07/2024 15.38 (H) 4.31 - 10.16 Thousand/uL Final   05/02/2024 11.41 (H) 4.31 - 10.16 Thousand/uL Final     Platelets   Date Value Ref Range Status   05/07/2024 305 149 - 390 Thousands/uL Final   05/02/2024 278 149 - 390 Thousands/uL Final     Creatinine   Date Value Ref Range Status   05/02/2024 0.63 0.60 - 1.30 mg/dL Final     Comment:     Standardized to IDMS reference method     AST   Date Value Ref Range Status   05/02/2024 11 (L) 13 - 39 U/L Final     ALT   Date Value Ref Range Status   05/02/2024 7 7 - 52 U/L Final     Comment:     Specimen collection should occur prior to Sulfasalazine administration due to the potential for falsely depressed results.           Constance Faye PA-C  5/8/2024  7:02 AM

## 2024-05-08 NOTE — PLAN OF CARE
Problem: PAIN - ADULT  Goal: Verbalizes/displays adequate comfort level or baseline comfort level  Description: Interventions:  - Encourage patient to monitor pain and request assistance  - Assess pain using appropriate pain scale  - Administer analgesics based on type and severity of pain and evaluate response  - Implement non-pharmacological measures as appropriate and evaluate response  - Consider cultural and social influences on pain and pain management  - Notify physician/advanced practitioner if interventions unsuccessful or patient reports new pain  Outcome: Progressing     Problem: INFECTION - ADULT  Goal: Absence or prevention of progression during hospitalization  Description: INTERVENTIONS:  - Assess and monitor for signs and symptoms of infection  - Monitor lab/diagnostic results  - Monitor all insertion sites, i.e. indwelling lines, tubes, and drains  - Monitor endotracheal if appropriate and nasal secretions for changes in amount and color  - [place] appropriate cooling/warming therapies per order  - Administer medications as ordered  - Instruct and encourage patient and family to use good hand hygiene technique  - Identify and instruct in appropriate isolation precautions for identified infection/condition  Outcome: Progressing  Goal: Absence of fever/infection during neutropenic period  Description: INTERVENTIONS:  - Monitor WBC    Outcome: Progressing     Problem: SAFETY ADULT  Goal: Patient will remain free of falls  Description: INTERVENTIONS:  - Educate patient/family on patient safety including physical limitations  - Instruct patient to call for assistance with activity   - Consult OT/PT to assist with strengthening/mobility   - Keep Call bell within reach  - Keep bed low and locked with side rails adjusted as appropriate  - Keep care items and personal belongings within reach  - Initiate and maintain comfort rounds  - Make Fall Risk Sign visible to staff  - Apply yellow socks and bracelet  for high fall risk patients  - Consider moving patient to room near nurses station  Outcome: Progressing  Goal: Maintain or return to baseline ADL function  Description: INTERVENTIONS:  -  Assess patient's ability to carry out ADLs; assess patient's baseline for ADL function and identify physical deficits which impact ability to perform ADLs (bathing, care of mouth/teeth, toileting, grooming, dressing, etc.)  - Assess/evaluate cause of self-care deficits   - Assess range of motion  - Assess patient's mobility; develop plan if impaired  - Assess patient's need for assistive devices and provide as appropriate  - Encourage maximum independence but intervene and supervise when necessary  - Involve family in performance of ADLs  - Assess for home care needs following discharge   - Consider OT consult to assist with ADL evaluation and planning for discharge  - Provide patient education as appropriate  Outcome: Progressing  Goal: Maintains/Returns to pre admission functional level  Description: INTERVENTIONS:  - Perform AM-PAC 6 Click Basic Mobility/ Daily Activity assessment daily.  - Set and communicate daily mobility goal to care team and patient/family/caregiver.   - Collaborate with rehabilitation services on mobility goals if consulted  - Out of bed for toileting  - Record patient progress and toleration of activity level   Outcome: Progressing     Problem: DISCHARGE PLANNING  Goal: Discharge to home or other facility with appropriate resources  Description: INTERVENTIONS:  - Identify barriers to discharge w/patient and caregiver  - Arrange for needed discharge resources and transportation as appropriate  - Identify discharge learning needs (meds, wound care, etc.)  - Arrange for interpretive services to assist at discharge as needed  - Refer to Case Management Department for coordinating discharge planning if the patient needs post-hospital services based on physician/advanced practitioner order or complex needs  related to functional status, cognitive ability, or social support system  Outcome: Progressing     Problem: Knowledge Deficit  Goal: Patient/family/caregiver demonstrates understanding of disease process, treatment plan, medications, and discharge instructions  Description: Complete learning assessment and assess knowledge base.  Interventions:  - Provide teaching at level of understanding  - Provide teaching via preferred learning methods  Outcome: Progressing     Problem: POSTPARTUM  Goal: Experiences normal postpartum course  Description: INTERVENTIONS:  - Monitor maternal vital signs  - Assess uterine involution and lochia  Outcome: Progressing  Goal: Appropriate maternal -  bonding  Description: INTERVENTIONS:  - Identify family support  - Assess for appropriate maternal/infant bonding   -Encourage maternal/infant bonding opportunities  - Referral to  or  as needed  Outcome: Progressing  Goal: Establishment of infant feeding pattern  Description: INTERVENTIONS:  - Assess breast/bottle feeding  - Refer to lactation as needed  Outcome: Progressing  Goal: Incision(s), wounds(s) or drain site(s) healing without S/S of infection  Description: INTERVENTIONS  - Assess and document dressing, incision, wound bed, drain sites and surrounding tissue  - Provide patient and family education    Outcome: Progressing

## 2024-05-09 LAB
2ND TRIMESTER 4 SCREEN SERPL-IMP: NORMAL
AFP ADJ MOM SERPL: NORMAL
AFP INTERP AMN-IMP: NORMAL
AFP INTERP SERPL-IMP: NORMAL
AFP INTERP SERPL-IMP: NORMAL
AFP SERPL-MCNC: 187.7 NG/ML
AGE AT DELIVERY: 24.8 YR
GA METHOD: NORMAL
GA: 31.4 WEEKS
IDDM PATIENT QL: NO
MULTIPLE PREGNANCY: NO
NEURAL TUBE DEFECT RISK FETUS: NORMAL %

## 2024-05-09 NOTE — UTILIZATION REVIEW
"    MOTHER DISCHARGED MAY 8, BABY REMAINS IN NICU    NOTIFICATION OF INPATIENT ADMISSION   MATERNITY/DELIVERY AUTHORIZATION REQUEST   SERVICING FACILITY:   [facility] - L&D, , NICU  [address] [place] [address]  Tax ID: [id number]  NPI: [id number]   ATTENDING PROVIDER:  Attending Name and NPI#: Alayna Murcia Md [[id number]]  Address: [address] [place] [address]  Phone: [phone]   ADMISSION INFORMATION:  Place of Service: Inpatient [facility]  Place of Service Code: 21  Inpatient Admission Date/Time: 24 12:07 AM  Discharge Date/Time: 2024  4:37 PM  Admitting Diagnosis Code/Description:  Encounter for supervision of normal pregnancy, unspecified, unspecified trimester [Z34.90]  Encounter for full-term uncomplicated delivery [O80]     Mother: Helena Garcia 2000 Estimated Date of Delivery: 24  Delivering clinician: Alayna Murcia    OB History          2    Para   2    Term   1       1    AB   0    Living   2         SAB   0    IAB   0    Ectopic   0    Multiple   0    Live Births   2                Name & MRN:   Information for the patient's :  Jose Baby Girl (Helena) [[id number]]   [place] Delivery Information:  Sex: female  Delivered 2024 1:47 AM by Vaginal, Spontaneous; Gestational Age: 31w4d     Measurements:  Weight: 2 lb 15.4 oz (1344 g);  Height: 36\"    APGAR 1 minute 5 minutes 10 minutes   Totals:         UTILIZATION REVIEW CONTACT:  Lorena Coe Utilization   Network Utilization Review Department  Phone: [phone]  Fax [phone]  Email: [email or web address]  Contact for approvals/pending authorizations, clinical reviews, and discharge.     PHYSICIAN ADVISORY SERVICES:  Medical Necessity Denial & Xali-vg-Ifcj Review  Phone: [phone]  Fax: [phone]  Email: [email or web address]     DISCHARGE SUPPORT TEAM:  For " Patients Discharge Needs & Updates  Phone: [phone] opt. 2 Fax: [phone]  Email: [email or web address]     NOTIFICATION OF ADMISSION DISCHARGE   This is a Notification of Discharge from [facility]. Please be advised that this patient has been discharge from our facility. Below you will find the admission and discharge date and time including the patient’s disposition.   UTILIZATION REVIEW CONTACT:  Kristina Love  Utilization   Network Utilization Review Department  Phone: [phone] x carefully listen to the prompts. All voicemails are confidential.  Email: [email or web address]     ADMISSION INFORMATION  PRESENTATION DATE: 5/6/2024 11:49 PM  OBERVATION ADMISSION DATE:   INPATIENT ADMISSION DATE: 5/7/24 12:07 AM   DISCHARGE DATE: 5/8/2024  4:37 PM   DISPOSITION:Home/Self Care    Network Utilization Review Department  ATTENTION: Please call with any questions or concerns to [phone] and carefully listen to the prompts so that you are directed to the right person. All voicemails are confidential.   For Discharge needs, contact Care Management DC Support Team at [phone] opt. 2  Send all requests for admission clinical reviews, approved or denied determinations and any other requests to dedicated fax number below belonging to the campus where the patient is receiving treatment. List of dedicated fax numbers for the Facilities:  FACILITY NAME UR FAX NUMBER   ADMISSION DENIALS (Administrative/Medical Necessity) [phone]   DISCHARGE SUPPORT TEAM ([facility]) [phone]   PARENT CHILD HEALTH (Maternity/NICU/Pediatrics) [phone]   [facility] [phone]   [facility] [phone]   Critical access hospital [phone]   [facility] [phone]   [facility] [phone]   [facility] [phone]    [facility] [phone]   [facility] [phone]   [facility] [phone]   [facility] [phone]   [facility] [phone]   [facility] [phone]

## 2024-05-10 PROCEDURE — 88307 TISSUE EXAM BY PATHOLOGIST: CPT | Performed by: STUDENT IN AN ORGANIZED HEALTH CARE EDUCATION/TRAINING PROGRAM

## 2024-05-13 ENCOUNTER — TELEPHONE (OUTPATIENT)
Dept: OBGYN CLINIC | Facility: CLINIC | Age: 24
End: 2024-05-13

## 2024-05-13 NOTE — TELEPHONE ENCOUNTER
Placed call to [phone] as contact number listed in patient's chart. Call was answered and advised patient was not present at this time, can keep on file but patient can be reached at this time at #[phone].  Placed call to [phone].     POSTPARTUM PHONE CALL ASSESSMENT    Date of Delivery: 24  Delivering Provider: Dr. Murcia  Mode:      Delivery Notes/Complications: PTD @31w4d   Do you still have bleeding/pain? If so, how much/how severe? Patient reports VB is very light at this time, changes pad few times a day but not saturated, very light per patient. Denies any pain.   Regular BMs/Urination? Yes  How are you doing emotionally? Patient reports doing well, is hard to be away form baby in NICU. Reports baby is doing good, anticipated to come home next month.   EPDS Score: 4  Do you have any other questions or concerns for us or your provider? Not at this time.  Do you have a postpartum visit scheduled? Offered and scheduled.

## 2024-07-24 ENCOUNTER — HOSPITAL ENCOUNTER (EMERGENCY)
Facility: HOSPITAL | Age: 24
Discharge: HOME | End: 2024-07-24
Attending: EMERGENCY MEDICINE
Payer: COMMERCIAL

## 2024-07-24 VITALS
WEIGHT: 165.8 LBS | TEMPERATURE: 98 F | SYSTOLIC BLOOD PRESSURE: 158 MMHG | DIASTOLIC BLOOD PRESSURE: 65 MMHG | HEART RATE: 117 BPM | BODY MASS INDEX: 25.97 KG/M2 | RESPIRATION RATE: 20 BRPM | OXYGEN SATURATION: 98 %

## 2024-07-24 DIAGNOSIS — L05.01 PILONIDAL ABSCESS: Primary | ICD-10-CM

## 2024-07-24 PROCEDURE — 87205 SMEAR GRAM STAIN: CPT | Performed by: PHYSICIAN ASSISTANT

## 2024-07-24 PROCEDURE — 99283 EMERGENCY DEPT VISIT LOW MDM: CPT

## 2024-07-24 PROCEDURE — 87070 CULTURE OTHR SPECIMN AEROBIC: CPT | Performed by: PHYSICIAN ASSISTANT

## 2024-07-24 PROCEDURE — 99284 EMERGENCY DEPT VISIT MOD MDM: CPT | Performed by: PHYSICIAN ASSISTANT

## 2024-07-24 PROCEDURE — 10080 I&D PILONIDAL CYST SIMPLE: CPT | Performed by: PHYSICIAN ASSISTANT

## 2024-07-24 RX ORDER — CEPHALEXIN 500 MG/1
500 CAPSULE ORAL EVERY 12 HOURS SCHEDULED
Qty: 20 CAPSULE | Refills: 0 | Status: SHIPPED | OUTPATIENT
Start: 2024-07-24 | End: 2024-08-03

## 2024-07-24 RX ORDER — LIDOCAINE HYDROCHLORIDE 10 MG/ML
5 INJECTION, SOLUTION EPIDURAL; INFILTRATION; INTRACAUDAL; PERINEURAL ONCE
Status: COMPLETED | OUTPATIENT
Start: 2024-07-24 | End: 2024-07-24

## 2024-07-24 RX ADMIN — LIDOCAINE HYDROCHLORIDE 5 ML: 10 INJECTION, SOLUTION EPIDURAL; INFILTRATION; INTRACAUDAL at 16:25

## 2024-07-24 NOTE — ED PROVIDER NOTES
"History  Chief Complaint   Patient presents with    Abscess     Pt reported feeling a \"rock\" in her buttocks. Pt first noticed a couple days ago. Denies any fevers or chills.     23-year-old female presenting today for evaluation of swelling and pain along the buttock region near the pilonidal area over the past 3 days, states that has gradually worsened.  She has never experienced the symptoms before.  Has not had any fevers or drainage, has not been placing warm compresses.        Prior to Admission Medications   Prescriptions Last Dose Informant Patient Reported? Taking?   acetaminophen (TYLENOL) 325 mg tablet   No No   Sig: Take 2 tablets (650 mg total) by mouth every 6 (six) hours   benzocaine-menthol-lanolin-aloe (DERMOPLAST) 20-0.5 % topical spray   No No   Sig: Apply 1 Application topically every 6 (six) hours as needed for mild pain or irritation   cyclobenzaprine (FLEXERIL) 10 mg tablet   No No   Sig: Take 1 tablet (10 mg total) by mouth 3 (three) times a day as needed for muscle spasms for up to 7 days   famotidine (PEPCID) 20 mg tablet   No No   Sig: Take 1 tablet (20 mg total) by mouth daily   hydrocortisone 1 % cream   No No   Sig: Apply 1 Application topically daily as needed for irritation or rash   ibuprofen (MOTRIN) 600 mg tablet   No No   Sig: Take 1 tablet (600 mg total) by mouth every 6 (six) hours   witch hazel-glycerin (TUCKS) topical pad   No No   Sig: Apply 1 Pad topically every 4 (four) hours as needed for irritation      Facility-Administered Medications: None       Past Medical History:   Diagnosis Date    ADHD        History reviewed. No pertinent surgical history.    Family History   Problem Relation Age of Onset    HIV Mother     Leukemia Other     Multiple sclerosis Maternal Aunt      I have reviewed and agree with the history as documented.    E-Cigarette/Vaping    E-Cigarette Use Former User      E-Cigarette/Vaping Substances    Nicotine Yes     THC No     CBD No     Flavoring Yes  "    Other No     Unknown No      Social History     Tobacco Use    Smoking status: Never    Smokeless tobacco: Never   Vaping Use    Vaping status: Former    Substances: Nicotine, Flavoring   Substance Use Topics    Alcohol use: Not Currently    Drug use: Yes     Types: Marijuana     Comment: occasionally       Review of Systems   Constitutional: Negative.  Negative for chills, fatigue and fever.   HENT: Negative.  Negative for congestion, postnasal drip, rhinorrhea and sore throat.    Eyes: Negative.    Respiratory: Negative.  Negative for cough, shortness of breath and wheezing.    Cardiovascular: Negative.    Gastrointestinal: Negative.  Negative for abdominal pain, diarrhea, nausea and vomiting.   Endocrine: Negative.    Genitourinary: Negative.    Musculoskeletal: Negative.    Skin:  Positive for color change. Negative for pallor, rash and wound.   Neurological: Negative.    Hematological: Negative.    Psychiatric/Behavioral: Negative.     All other systems reviewed and are negative.      Physical Exam  Physical Exam  Vitals and nursing note reviewed. Exam conducted with a chaperone present.   Constitutional:       Appearance: Normal appearance.   HENT:      Head: Normocephalic and atraumatic.      Right Ear: External ear normal.      Left Ear: External ear normal.      Nose: Nose normal.   Eyes:      Conjunctiva/sclera: Conjunctivae normal.   Cardiovascular:      Rate and Rhythm: Normal rate.   Pulmonary:      Effort: Pulmonary effort is normal.   Abdominal:      General: There is no distension.   Genitourinary:      Musculoskeletal:         General: No deformity. Normal range of motion.      Cervical back: Normal range of motion.   Skin:     General: Skin is dry.      Findings: No rash.   Neurological:      General: No focal deficit present.      Mental Status: She is alert and oriented to person, place, and time. Mental status is at baseline.   Psychiatric:         Mood and Affect: Mood normal.          "Behavior: Behavior normal.         Thought Content: Thought content normal.         Judgment: Judgment normal.         Vital Signs  ED Triage Vitals [07/24/24 1504]   Temperature Pulse Respirations Blood Pressure SpO2   98 °F (36.7 °C) (!) 117 20 158/65 98 %      Temp Source Heart Rate Source Patient Position - Orthostatic VS BP Location FiO2 (%)   Tympanic Monitor Sitting Right arm --      Pain Score       No Pain           Vitals:    07/24/24 1504   BP: 158/65   Pulse: (!) 117   Patient Position - Orthostatic VS: Sitting         Visual Acuity      ED Medications  Medications   lidocaine (PF) (XYLOCAINE-MPF) 1 % injection 5 mL (has no administration in time range)       Diagnostic Studies  Results Reviewed       Procedure Component Value Units Date/Time    Wound culture and Gram stain [[id number]]     Lab Status: No result Specimen: Wound from Buttock                    No orders to display              Procedures  Incision and drain    Date/Time: 7/24/2024 4:20 PM    Performed by: Miriam Mercer PA-C  Authorized by: Miriam Mercer PA-C  Universal Protocol:  Consent: Verbal consent obtained.  Risks and benefits: risks, benefits and alternatives were discussed  Consent given by: patient  Time out: Immediately prior to procedure a \"time out\" was called to verify the correct patient, procedure, equipment, support staff and site/side marked as required.  Timeout called at: 7/24/2024 4:20 PM.  Patient understanding: patient states understanding of the procedure being performed  Patient consent: the patient's understanding of the procedure matches consent given  Procedure consent: procedure consent matches procedure scheduled  Relevant documents: relevant documents present and verified  Test results: test results available and properly labeled  Site marked: the operative site was marked  Radiology Images displayed and confirmed. If images not available, report reviewed: imaging studies available  Required items: " required blood products, implants, devices, and special equipment available  Patient identity confirmed: verbally with patient    Patient location:  ED  Location:     Type:  Abscess and pilonidal cyst    Location:  Anogenital    Anogenital location:  Pilonidal  Pre-procedure details:     Skin preparation:  Betadine  Anesthesia (see MAR for exact dosages):     Anesthesia method:  Local infiltration    Local anesthetic:  Lidocaine 1% w/o epi  Procedure details:     Complexity:  Simple    Incision types:  Single straight    Scalpel blade:  11    Approach:  Open    Incision depth:  Subcutaneous    Wound management:  Probed and deloculated and irrigated with saline    Drainage:  Purulent and bloody    Drainage amount:  Scant    Wound treatment:  Wound left open    Packing materials:  None  Post-procedure details:     Patient tolerance of procedure:  Tolerated well, no immediate complications           ED Course                                               Medical Decision Making  Overall a very small abscess however it was opened and patient tolerated very well, culture taken.  Given overlying erythema and warmth will start on antibiotics and send for wound culture.  Advised warm compresses.  Discussed with patient that pilonidal cyst may recur.  Strict return precautions for worsening including not limited to fevers, severe pain, spreading redness etc.  Patient verbalized understanding and agrees with the above assessment and plan.    Amount and/or Complexity of Data Reviewed  Labs: ordered.    Risk  Prescription drug management.                 Disposition  Final diagnoses:   Pilonidal abscess     Time reflects when diagnosis was documented in both MDM as applicable and the Disposition within this note       Time User Action Codes Description Comment    7/24/2024  4:21 PM Miriam Mercer Add [L05.01] Pilonidal abscess           ED Disposition       ED Disposition   Discharge    Condition   Stable    Date/Time    Wed Jul 24, 2024  4:21 PM    Comment   Helena Garcia discharge to home/self care.                   Follow-up Information       Follow up With Specialties Details Why Contact Info Additional Information    [facility] Emergency Department Emergency Medicine Go to  If symptoms worsen [address] [facility] [address]  [phone] [facility] Emergency Department, [address], [place], [address]            Patient's Medications   Discharge Prescriptions    CEPHALEXIN (KEFLEX) 500 MG CAPSULE    Take 1 capsule (500 mg total) by mouth every 12 (twelve) hours for 10 days       Start Date: 7/24/2024 End Date: 8/3/2024       Order Dose: 500 mg       Quantity: 20 capsule    Refills: 0       No discharge procedures on file.    PDMP Review       None            ED Provider  Electronically Signed by             Miriam Mercer PA-C  07/24/24 6335

## 2024-07-26 LAB
BACTERIA WND AEROBE CULT: NORMAL
GRAM STN SPEC: NORMAL

## 2024-08-08 ENCOUNTER — TELEPHONE (OUTPATIENT)
Age: 24
End: 2024-08-08

## 2024-08-08 NOTE — TELEPHONE ENCOUNTER
Patient called the RX Refill Line.  Message is being forwarded to the office.     Patient is requesting a new script for  medroxyPROGESTERone (DEPO-PROVERA) IM injection 150 mg to be sent to [facility] [address] [place] [address] - [address]. Pt needs it for her appointment tomorrow.    Please contact patient at   [phone]

## 2024-08-09 ENCOUNTER — CLINICAL SUPPORT (OUTPATIENT)
Dept: OBGYN CLINIC | Facility: CLINIC | Age: 24
End: 2024-08-09
Payer: COMMERCIAL

## 2024-08-09 VITALS
DIASTOLIC BLOOD PRESSURE: 80 MMHG | BODY MASS INDEX: 26.53 KG/M2 | HEIGHT: 67 IN | SYSTOLIC BLOOD PRESSURE: 120 MMHG | WEIGHT: 169 LBS

## 2024-08-09 DIAGNOSIS — Z30.42 SURVEILLANCE FOR DEPO-PROVERA CONTRACEPTION: Primary | ICD-10-CM

## 2024-08-09 DIAGNOSIS — Z30.013 ENCOUNTER FOR INITIAL PRESCRIPTION OF INJECTABLE CONTRACEPTIVE: ICD-10-CM

## 2024-08-09 DIAGNOSIS — Z30.09 BIRTH CONTROL COUNSELING: Primary | ICD-10-CM

## 2024-08-09 PROCEDURE — 96372 THER/PROPH/DIAG INJ SC/IM: CPT

## 2024-08-09 RX ORDER — MEDROXYPROGESTERONE ACETATE 150 MG/ML
150 INJECTION, SUSPENSION INTRAMUSCULAR
Qty: 1 ML | Refills: 3 | Status: SHIPPED | OUTPATIENT
Start: 2024-08-09

## 2024-08-09 RX ADMIN — MEDROXYPROGESTERONE ACETATE 150 MG: 150 INJECTION, SUSPENSION INTRAMUSCULAR at 15:45

## 2024-08-09 NOTE — PROGRESS NOTES
Pt here for 2nd depo injection.  She states her first one was given before she was discharged from the hospital (postpartum).  She states she got her period 8/4/2024, first period since the baby, very heavy.  Depo was administered in right buttock without incident.  She will return in 3 months for next injection, appointment scheduled 10/25/2024.    LOT # [id number]  EXP 3/31/2028  NDC [id number]    Dottie JOE/RAMAN

## 2024-08-12 RX ORDER — MEDROXYPROGESTERONE ACETATE 150 MG/ML
150 INJECTION, SUSPENSION INTRAMUSCULAR
Status: SHIPPED | OUTPATIENT
Start: 2024-08-12

## 2025-07-04 ENCOUNTER — HOSPITAL ENCOUNTER (EMERGENCY)
Facility: HOSPITAL | Age: 25
Discharge: HOME | End: 2025-07-04
Attending: EMERGENCY MEDICINE
Payer: COMMERCIAL

## 2025-07-04 VITALS
OXYGEN SATURATION: 100 % | HEART RATE: 87 BPM | TEMPERATURE: 98.6 F | SYSTOLIC BLOOD PRESSURE: 133 MMHG | RESPIRATION RATE: 18 BRPM | DIASTOLIC BLOOD PRESSURE: 82 MMHG

## 2025-07-04 DIAGNOSIS — M79.10 MUSCLE PAIN: Primary | ICD-10-CM

## 2025-07-04 LAB
ANION GAP SERPL CALCULATED.3IONS-SCNC: 8 MMOL/L (ref 4–13)
ANISOCYTOSIS BLD QL SMEAR: PRESENT
BASOPHILS # BLD MANUAL: 0 THOUSAND/UL (ref 0–0.1)
BASOPHILS NFR MAR MANUAL: 0 % (ref 0–1)
BUN SERPL-MCNC: 11 MG/DL (ref 5–25)
CALCIUM SERPL-MCNC: 9 MG/DL (ref 8.4–10.2)
CHLORIDE SERPL-SCNC: 106 MMOL/L (ref 96–108)
CK SERPL-CCNC: 361 U/L (ref 26–192)
CO2 SERPL-SCNC: 22 MMOL/L (ref 21–32)
CREAT SERPL-MCNC: 0.85 MG/DL (ref 0.6–1.3)
EOSINOPHIL # BLD MANUAL: 0 THOUSAND/UL (ref 0–0.4)
EOSINOPHIL NFR BLD MANUAL: 0 % (ref 0–6)
ERYTHROCYTE [DISTWIDTH] IN BLOOD BY AUTOMATED COUNT: 14.9 % (ref 11.6–15.1)
GFR SERPL CREATININE-BSD FRML MDRD: 95 ML/MIN/1.73SQ M
GLUCOSE SERPL-MCNC: 79 MG/DL (ref 65–140)
HCT VFR BLD AUTO: 41.3 % (ref 34.8–46.1)
HGB BLD-MCNC: 13.5 G/DL (ref 11.5–15.4)
LYMPHOCYTES # BLD AUTO: 29 % (ref 14–44)
LYMPHOCYTES # BLD AUTO: 3.02 THOUSAND/UL (ref 0.6–4.47)
MCH RBC QN AUTO: 28.1 PG (ref 26.8–34.3)
MCHC RBC AUTO-ENTMCNC: 32.7 G/DL (ref 31.4–37.4)
MCV RBC AUTO: 86 FL (ref 82–98)
MONOCYTES # BLD AUTO: 0.42 THOUSAND/UL (ref 0–1.22)
MONOCYTES NFR BLD: 4 % (ref 4–12)
NEUTROPHILS # BLD MANUAL: 6.99 THOUSAND/UL (ref 1.85–7.62)
NEUTS SEG NFR BLD AUTO: 67 % (ref 43–75)
PLATELET # BLD AUTO: 175 THOUSANDS/UL (ref 149–390)
PLATELET BLD QL SMEAR: ADEQUATE
PLATELET CLUMP BLD QL SMEAR: PRESENT
PMV BLD AUTO: 10.5 FL (ref 8.9–12.7)
POIKILOCYTOSIS BLD QL SMEAR: PRESENT
POLYCHROMASIA BLD QL SMEAR: PRESENT
POTASSIUM SERPL-SCNC: 4 MMOL/L (ref 3.5–5.3)
RBC # BLD AUTO: 4.81 MILLION/UL (ref 3.81–5.12)
RBC MORPH BLD: PRESENT
SODIUM SERPL-SCNC: 136 MMOL/L (ref 135–147)
WBC # BLD AUTO: 10.43 THOUSAND/UL (ref 4.31–10.16)

## 2025-07-04 PROCEDURE — 99283 EMERGENCY DEPT VISIT LOW MDM: CPT

## 2025-07-04 PROCEDURE — 36415 COLL VENOUS BLD VENIPUNCTURE: CPT | Performed by: EMERGENCY MEDICINE

## 2025-07-04 PROCEDURE — 82550 ASSAY OF CK (CPK): CPT | Performed by: EMERGENCY MEDICINE

## 2025-07-04 PROCEDURE — 85007 BL SMEAR W/DIFF WBC COUNT: CPT | Performed by: EMERGENCY MEDICINE

## 2025-07-04 PROCEDURE — 85027 COMPLETE CBC AUTOMATED: CPT | Performed by: EMERGENCY MEDICINE

## 2025-07-04 PROCEDURE — 80048 BASIC METABOLIC PNL TOTAL CA: CPT | Performed by: EMERGENCY MEDICINE

## 2025-07-04 PROCEDURE — 99284 EMERGENCY DEPT VISIT MOD MDM: CPT | Performed by: EMERGENCY MEDICINE

## 2025-07-04 RX ORDER — IBUPROFEN 400 MG/1
400 TABLET, FILM COATED ORAL ONCE
Status: COMPLETED | OUTPATIENT
Start: 2025-07-04 | End: 2025-07-04

## 2025-07-04 RX ADMIN — IBUPROFEN 400 MG: 400 TABLET, FILM COATED ORAL at 11:57

## 2025-07-04 NOTE — ED PROVIDER NOTES
"Time reflects when diagnosis was documented in both MDM as applicable and the Disposition within this note       Time User Action Codes Description Comment    7/4/2025 12:50 PM Derrick Rodriguez Add [M79.10] Muscle pain           ED Disposition       ED Disposition   Discharge    Condition   Stable    Date/Time   Fri Jul 4, 2025 12:50 PM    Comment   Helena Garcia discharge to home/self care.                   Assessment & Plan       Medical Decision Making  24-year-old female, presenting with bilateral lower leg pain.  Differential diagnosis includes muscle strain, rhabdomyolysis, DVT among other diagnoses.  On exam, bilateral lower extremities with no swelling or deformity, no skin changes, strong palpable DP pulses.  Patient low risk for DVT.  Labs ordered.  Will give oral pain medication.    Amount and/or Complexity of Data Reviewed  Labs: ordered. Decision-making details documented in ED Course.    Risk  Prescription drug management.        ED Course as of 07/04/25 1253   Fri Jul 04, 2025   1252 Labs reviewed.  CK with mild elevation, no significant abnormalities.  Leg pain likely due to overuse, muscle strain.  Patient instructed to stretch, ice and elevate.  May take over-the-counter medication such as ibuprofen.  Instructed to follow-up or return for any worsening or new concerning symptoms.       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     24-year-old female, presenting with bilateral lower leg pain.  Patient reports pain in foot to lower leg and bilateral lower extremities.  Patient reports pain has been going on for several days.  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  Denies any swelling or skin changes to lower extremities.  No weakness or numbness.  Patient is not on any medications.      History provided by:  Patient   used: No    Leg Pain  Associated symptoms: no fever        Review of Systems   Constitutional: Negative.  Negative for fever.   Respiratory: Negative.     Cardiovascular: Negative.    Gastrointestinal: Negative.    Skin: Negative.    Neurological: Negative.            Objective       ED Triage Vitals   Temperature Pulse Blood Pressure Respirations SpO2 Patient Position - Orthostatic VS   07/04/25 1142 07/04/25 1142 07/04/25 1142 07/04/25 1142 07/04/25 1142 07/04/25 1142   98.6 °F (37 °C) 87 133/82 18 100 % Sitting      Temp Source Heart Rate Source BP Location FiO2 (%) Pain Score    07/04/25 1142 07/04/25 1142 07/04/25 1142 -- 07/04/25 1157    Oral Monitor Left arm  7      Vitals      Date and Time Temp Pulse SpO2 Resp BP Pain Score FACES Pain Rating User   07/04/25 1157 -- -- -- -- -- 7 -- MD   07/04/25 1142 98.6 °F (37 °C) 87 100 % 18 133/82 -- --             Physical Exam  Vitals and nursing note reviewed.   Constitutional:       General: She is not in acute distress.  HENT:      Head: Normocephalic and atraumatic.     Cardiovascular:      Rate and Rhythm: Normal rate and regular rhythm.      Pulses:           Dorsalis pedis pulses are 2+ on the right side and 2+ on the left side.   Pulmonary:      Breath sounds: Normal breath sounds.     Musculoskeletal:         General: No swelling. Normal range of  motion.      Comments: Bilateral lower extremities with no swelling or deformity, no tenderness, no calf tenderness     Skin:     General: Skin is warm and dry.      Findings: No erythema.     Neurological:      General: No focal deficit present.      Mental Status: She is alert and oriented to person, place, and time.      Sensory: No sensory deficit.      Motor: No weakness.      Gait: Gait normal.         Results Reviewed       Procedure Component Value Units Date/Time    Manual Differential(PHLEBS Do Not Order) [[id number]] Collected: 07/04/25 1201    Lab Status: Final result Specimen: Blood from Arm, Left Updated: 07/04/25 1250     Segmented % 67 %      Lymphocytes % 29 %      Monocytes % 4 %      Eosinophils % 0 %      Basophils % 0 %      Absolute Neutrophils 6.99 Thousand/uL      Absolute Lymphocytes 3.02 Thousand/uL      Absolute Monocytes 0.42 Thousand/uL      Absolute Eosinophils 0.00 Thousand/uL      Absolute Basophils 0.00 Thousand/uL      Total Counted --     RBC Morphology Present     Platelet Estimate Adequate     Clumped Platelets Present     Anisocytosis Present     Poikilocytes Present     Polychromasia Present    CBC and differential [[id number]]  (Abnormal) Collected: 07/04/25 1201    Lab Status: Final result Specimen: Blood from Arm, Left Updated: 07/04/25 1250     WBC 10.43 Thousand/uL      RBC 4.81 Million/uL      Hemoglobin 13.5 g/dL      Hematocrit 41.3 %      MCV 86 fL      MCH 28.1 pg      MCHC 32.7 g/dL      RDW 14.9 %      MPV 10.5 fL      Platelets 175 Thousands/uL     Narrative:      This is an appended report.  These results have been appended to a previously verified report.    RBC Morphology Reflex Test [[id number]] Collected: 07/04/25 1201    Lab Status: In process Specimen: Blood from Arm, Left Updated: 07/04/25 1249    Basic metabolic panel [[id number]] Collected: 07/04/25 1201    Lab Status: Final result Specimen: Blood from Arm, Left Updated: 07/04/25 1222     Sodium 136 mmol/L       Potassium 4.0 mmol/L      Chloride 106 mmol/L      CO2 22 mmol/L      ANION GAP 8 mmol/L      BUN 11 mg/dL      Creatinine 0.85 mg/dL      Glucose 79 mg/dL      Calcium 9.0 mg/dL      eGFR 95 ml/min/1.73sq m     Narrative:      National Kidney Disease Foundation guidelines for Chronic Kidney Disease (CKD):     Stage 1 with normal or high GFR (GFR > 90 mL/min/1.73 square meters)    Stage 2 Mild CKD (GFR = 60-89 mL/min/1.73 square meters)    Stage 3A Moderate CKD (GFR = 45-59 mL/min/1.73 square meters)    Stage 3B Moderate CKD (GFR = 30-44 mL/min/1.73 square meters)    Stage 4 Severe CKD (GFR = 15-29 mL/min/1.73 square meters)    Stage 5 End Stage CKD (GFR <15 mL/min/1.73 square meters)  Note: GFR calculation is accurate only with a steady state creatinine    CK [[id number]]  (Abnormal) Collected: 07/04/25 1201    Lab Status: Final result Specimen: Blood from Arm, Left Updated: 07/04/25 1222     Total  U/L             No orders to display       Procedures    ED Medication and Procedure Management   Prior to Admission Medications   Prescriptions Last Dose Informant Patient Reported? Taking?   acetaminophen (TYLENOL) 325 mg tablet   No No   Sig: Take 2 tablets (650 mg total) by mouth every 6 (six) hours   benzocaine-menthol-lanolin-aloe (DERMOPLAST) 20-0.5 % topical spray   No No   Sig: Apply 1 Application topically every 6 (six) hours as needed for mild pain or irritation   Patient not taking: Reported on 8/9/2024   cyclobenzaprine (FLEXERIL) 10 mg tablet   No No   Sig: Take 1 tablet (10 mg total) by mouth 3 (three) times a day as needed for muscle spasms for up to 7 days   famotidine (PEPCID) 20 mg tablet   No No   Sig: Take 1 tablet (20 mg total) by mouth daily   Patient not taking: Reported on 8/9/2024   hydrocortisone 1 % cream   No No   Sig: Apply 1 Application topically daily as needed for irritation or rash   Patient not taking: Reported on 8/9/2024   ibuprofen (MOTRIN) 600 mg tablet   No No   Sig: Take  1 tablet (600 mg total) by mouth every 6 (six) hours   medroxyPROGESTERone (DEPO-PROVERA) 150 mg/mL injection   No No   Sig: Inject 1 mL (150 mg total) into a muscle every 3 (three) months   witch hazel-glycerin (TUCKS) topical pad   No No   Sig: Apply 1 Pad topically every 4 (four) hours as needed for irritation   Patient not taking: Reported on 8/9/2024      Facility-Administered Medications Last Administration Doses Remaining   medroxyPROGESTERone (DEPO-PROVERA) IM injection 150 mg 8/9/2024  3:45 PM         Patient's Medications   Discharge Prescriptions    No medications on file     No discharge procedures on file.  ED SEPSIS DOCUMENTATION   Time reflects when diagnosis was documented in both MDM as applicable and the Disposition within this note       Time User Action Codes Description Comment    7/4/2025 12:50 PM Derrick Rodriguez Add [M79.10] Muscle pain                    [1]   Past Medical History:  Diagnosis Date    ADHD    [2] No past surgical history on file.  [3]   Family History  Problem Relation Name Age of Onset    HIV Mother      Leukemia Other MGGM     Multiple sclerosis Maternal Aunt     [4]   Social History  Tobacco Use    Smoking status: Never    Smokeless tobacco: Never   Vaping Use    Vaping status: Former    Substances: Nicotine, Flavoring   Substance Use Topics    Alcohol use: Yes     Comment: socially    Drug use: Yes     Types: Marijuana     Comment: occasionally        Derrick Rodriguez MD  07/04/25 0320

## 2025-07-04 NOTE — DISCHARGE INSTRUCTIONS
Patient Education     Muscle Strain ED   General Information   You came to the Emergency Department (ED) for a muscle strain. This is also known as a pulled muscle. A muscle strain happens when muscles are stretched too much or work too hard. It can also happen if muscles are stretched too quickly. Muscle strains can be minor or serious. The amount of time it takes to heal will depend on how bad your muscle strain is as well as your age and overall health.  What care is needed at home?   Call your regular doctor to let them know you were in the ED. Make a follow-up appointment if you were told to.  Rest your muscle. If you can, prop it on pillows when you rest. Once you have less pain, slowly increase your activity level. If your muscle starts to hurt again, rest it.  Place an ice pack or a bag of frozen vegetables wrapped in a towel over the painful part. Never put ice right on the skin. Use ice every 1 to 2 hours for 10 to 15 minutes at a time. Use for the first 24 to 48 hours after your injury.  You may want to take medicines like acetaminophen, ibuprofen, or naproxen for swelling and pain.  When do I need to call the doctor?   You are not able to move the injured muscle because of the pain.  The pain or swelling become worse.  You keep straining the same muscle.  You have new or worsening symptoms.  Last Reviewed Date   2020-09-16  Consumer Information Use and Disclaimer   This generalized information is a limited summary of diagnosis, treatment, and/or medication information. It is not meant to be comprehensive and should be used as a tool to help the user understand and/or assess potential diagnostic and treatment options. It does NOT include all information about conditions, treatments, medications, side effects, or risks that may apply to a specific patient. It is not intended to be medical advice or a substitute for the medical advice, diagnosis, or treatment of a health care provider based on the health  care provider's examination and assessment of a patient’s specific and unique circumstances. Patients must speak with a health care provider for complete information about their health, medical questions, and treatment options, including any risks or benefits regarding use of medications. This information does not endorse any treatments or medications as safe, effective, or approved for treating a specific patient. [employer] and its affiliates disclaim any warranty or liability relating to this information or the use thereof. The use of this information is governed by the Terms of Use, available at https://www.woltersGobbleruwer.com/en/know/clinical-effectiveness-terms   Copyright   Copyright © 2024 [employer] and its affiliates and/or licensors. All rights reserved.

## 2025-08-09 ENCOUNTER — OFFICE VISIT (OUTPATIENT)
Dept: URGENT CARE | Facility: CLINIC | Age: 25
End: 2025-08-09
Payer: COMMERCIAL

## 2025-08-09 VITALS
RESPIRATION RATE: 14 BRPM | HEART RATE: 101 BPM | BODY MASS INDEX: 28.51 KG/M2 | SYSTOLIC BLOOD PRESSURE: 102 MMHG | WEIGHT: 182 LBS | DIASTOLIC BLOOD PRESSURE: 66 MMHG | OXYGEN SATURATION: 97 % | TEMPERATURE: 97.6 F

## 2025-08-09 DIAGNOSIS — H57.89 IRRITATION OF LEFT EYE: Primary | ICD-10-CM

## 2025-08-09 PROCEDURE — 99203 OFFICE O/P NEW LOW 30 MIN: CPT | Performed by: PHYSICIAN ASSISTANT

## 2025-08-09 RX ORDER — TETRACAINE HYDROCHLORIDE 5 MG/ML
1 SOLUTION OPHTHALMIC ONCE
Status: COMPLETED | OUTPATIENT
Start: 2025-08-09 | End: 2025-08-09

## 2025-08-09 RX ADMIN — TETRACAINE HYDROCHLORIDE 1 DROP: 5 SOLUTION OPHTHALMIC at 16:19

## 2025-08-10 RX ORDER — FLUORESCEIN SODIUM 1 MG/MG
1 STRIP OPHTHALMIC ONCE
Qty: 100 EACH | Refills: 0 | Status: CANCELLED | OUTPATIENT
Start: 2025-08-10 | End: 2025-08-10